# Patient Record
Sex: FEMALE | Race: WHITE | NOT HISPANIC OR LATINO | URBAN - METROPOLITAN AREA
[De-identification: names, ages, dates, MRNs, and addresses within clinical notes are randomized per-mention and may not be internally consistent; named-entity substitution may affect disease eponyms.]

---

## 2017-01-11 ENCOUNTER — OUTPATIENT (OUTPATIENT)
Dept: OUTPATIENT SERVICES | Facility: HOSPITAL | Age: 82
LOS: 1 days | End: 2017-01-11
Payer: MEDICARE

## 2017-01-11 LAB
BASOPHILS NFR BLD AUTO: 0.4 % — SIGNIFICANT CHANGE UP (ref 0–2)
EOSINOPHIL NFR BLD AUTO: 1.2 % — SIGNIFICANT CHANGE UP (ref 0–6)
HCT VFR BLD CALC: 39 % — SIGNIFICANT CHANGE UP (ref 34.5–45)
HGB BLD-MCNC: 11.8 G/DL — SIGNIFICANT CHANGE UP (ref 11.5–15.5)
LYMPHOCYTES # BLD AUTO: 18.6 % — SIGNIFICANT CHANGE UP (ref 13–44)
MCHC RBC-ENTMCNC: 23.7 PG — LOW (ref 27–34)
MCHC RBC-ENTMCNC: 30.3 G/DL — LOW (ref 32–36)
MCV RBC AUTO: 78.3 FL — LOW (ref 80–100)
MONOCYTES NFR BLD AUTO: 6.8 % — SIGNIFICANT CHANGE UP (ref 2–14)
NEUTROPHILS NFR BLD AUTO: 73 % — SIGNIFICANT CHANGE UP (ref 43–77)
PLATELET # BLD AUTO: 420 K/UL — HIGH (ref 150–400)
RBC # BLD: 4.98 M/UL — SIGNIFICANT CHANGE UP (ref 3.8–5.2)
RBC # BLD: 4.98 M/UL — SIGNIFICANT CHANGE UP (ref 3.8–5.2)
RBC # FLD: 18 % — HIGH (ref 10.3–16.9)
RETICS/RBC NFR: 1.3 % — SIGNIFICANT CHANGE UP (ref 0.5–2.5)
WBC # BLD: 7.3 K/UL — SIGNIFICANT CHANGE UP (ref 3.8–10.5)
WBC # FLD AUTO: 7.3 K/UL — SIGNIFICANT CHANGE UP (ref 3.8–10.5)

## 2017-01-11 PROCEDURE — 36415 COLL VENOUS BLD VENIPUNCTURE: CPT

## 2017-01-11 PROCEDURE — 85045 AUTOMATED RETICULOCYTE COUNT: CPT

## 2017-01-11 PROCEDURE — 85025 COMPLETE CBC W/AUTO DIFF WBC: CPT

## 2017-01-12 DIAGNOSIS — R53.1 WEAKNESS: ICD-10-CM

## 2017-01-12 DIAGNOSIS — D62 ACUTE POSTHEMORRHAGIC ANEMIA: ICD-10-CM

## 2017-01-12 DIAGNOSIS — D50.9 IRON DEFICIENCY ANEMIA, UNSPECIFIED: ICD-10-CM

## 2017-01-12 LAB
ANISOCYTOSIS BLD QL: SLIGHT — SIGNIFICANT CHANGE UP
EOSINOPHIL NFR BLD AUTO: 1 % — SIGNIFICANT CHANGE UP (ref 0–6)
HYPOCHROMIA BLD QL: SLIGHT — SIGNIFICANT CHANGE UP
LYMPHOCYTES # BLD AUTO: 20 % — SIGNIFICANT CHANGE UP (ref 13–44)
MANUAL DIF COMMENT BLD-IMP: SIGNIFICANT CHANGE UP
MANUAL SMEAR VERIFICATION: YES — SIGNIFICANT CHANGE UP
MICROCYTES BLD QL: SLIGHT — SIGNIFICANT CHANGE UP
MONOCYTES NFR BLD AUTO: 6 % — SIGNIFICANT CHANGE UP (ref 2–14)
NEUTROPHILS NFR BLD AUTO: 71 % — SIGNIFICANT CHANGE UP (ref 43–77)
NEUTS BAND # BLD: 2 % — SIGNIFICANT CHANGE UP
OVALOCYTES BLD QL SMEAR: SLIGHT — SIGNIFICANT CHANGE UP
PLAT MORPH BLD: (no result)
RBC BLD AUTO: (no result)
SCHISTOCYTES BLD QL AUTO: SIGNIFICANT CHANGE UP
SPHEROCYTES BLD QL SMEAR: SLIGHT — SIGNIFICANT CHANGE UP
TARGETS BLD QL SMEAR: SLIGHT — SIGNIFICANT CHANGE UP

## 2017-01-24 ENCOUNTER — OUTPATIENT (OUTPATIENT)
Dept: OUTPATIENT SERVICES | Facility: HOSPITAL | Age: 82
LOS: 1 days | End: 2017-01-24
Payer: MEDICARE

## 2017-01-24 LAB
BASOPHILS NFR BLD AUTO: 0.4 % — SIGNIFICANT CHANGE UP (ref 0–2)
EOSINOPHIL NFR BLD AUTO: 1.7 % — SIGNIFICANT CHANGE UP (ref 0–6)
HCT VFR BLD CALC: 31.2 % — LOW (ref 34.5–45)
HGB BLD-MCNC: 9.4 G/DL — LOW (ref 11.5–15.5)
LYMPHOCYTES # BLD AUTO: 18.6 % — SIGNIFICANT CHANGE UP (ref 13–44)
MCHC RBC-ENTMCNC: 23.6 PG — LOW (ref 27–34)
MCHC RBC-ENTMCNC: 30.1 G/DL — LOW (ref 32–36)
MCV RBC AUTO: 78.4 FL — LOW (ref 80–100)
MONOCYTES NFR BLD AUTO: 8.3 % — SIGNIFICANT CHANGE UP (ref 2–14)
NEUTROPHILS NFR BLD AUTO: 71 % — SIGNIFICANT CHANGE UP (ref 43–77)
PLATELET # BLD AUTO: 199 K/UL — SIGNIFICANT CHANGE UP (ref 150–400)
RBC # BLD: 3.98 M/UL — SIGNIFICANT CHANGE UP (ref 3.8–5.2)
RBC # BLD: 3.98 M/UL — SIGNIFICANT CHANGE UP (ref 3.8–5.2)
RBC # FLD: 18.7 % — HIGH (ref 10.3–16.9)
RETICS/RBC NFR: 1.6 % — SIGNIFICANT CHANGE UP (ref 0.5–2.5)
WBC # BLD: 7.1 K/UL — SIGNIFICANT CHANGE UP (ref 3.8–10.5)
WBC # FLD AUTO: 7.1 K/UL — SIGNIFICANT CHANGE UP (ref 3.8–10.5)

## 2017-01-24 PROCEDURE — 85025 COMPLETE CBC W/AUTO DIFF WBC: CPT

## 2017-01-24 PROCEDURE — 36415 COLL VENOUS BLD VENIPUNCTURE: CPT

## 2017-01-24 PROCEDURE — 85045 AUTOMATED RETICULOCYTE COUNT: CPT

## 2017-01-25 DIAGNOSIS — D62 ACUTE POSTHEMORRHAGIC ANEMIA: ICD-10-CM

## 2017-01-25 DIAGNOSIS — D50.9 IRON DEFICIENCY ANEMIA, UNSPECIFIED: ICD-10-CM

## 2017-01-25 DIAGNOSIS — R53.1 WEAKNESS: ICD-10-CM

## 2017-01-25 LAB
ANISOCYTOSIS BLD QL: SLIGHT — SIGNIFICANT CHANGE UP
BASOPHILS NFR BLD AUTO: 1 % — SIGNIFICANT CHANGE UP (ref 0–2)
DACRYOCYTES BLD QL SMEAR: SLIGHT — SIGNIFICANT CHANGE UP
EOSINOPHIL NFR BLD AUTO: 1 % — SIGNIFICANT CHANGE UP (ref 0–6)
HYPOCHROMIA BLD QL: SLIGHT — SIGNIFICANT CHANGE UP
LYMPHOCYTES # BLD AUTO: 14 % — SIGNIFICANT CHANGE UP (ref 13–44)
MANUAL DIF COMMENT BLD-IMP: SIGNIFICANT CHANGE UP
MANUAL SMEAR VERIFICATION: YES — SIGNIFICANT CHANGE UP
MICROCYTES BLD QL: SLIGHT — SIGNIFICANT CHANGE UP
MONOCYTES NFR BLD AUTO: 4 % — SIGNIFICANT CHANGE UP (ref 2–14)
NEUTROPHILS NFR BLD AUTO: 77 % — SIGNIFICANT CHANGE UP (ref 43–77)
NEUTS BAND # BLD: 3 % — SIGNIFICANT CHANGE UP
OVALOCYTES BLD QL SMEAR: SLIGHT — SIGNIFICANT CHANGE UP
PLAT MORPH BLD: (no result)
RBC BLD AUTO: (no result)
SCHISTOCYTES BLD QL AUTO: SIGNIFICANT CHANGE UP
SPHEROCYTES BLD QL SMEAR: SLIGHT — SIGNIFICANT CHANGE UP

## 2017-01-26 ENCOUNTER — OUTPATIENT (OUTPATIENT)
Dept: OUTPATIENT SERVICES | Facility: HOSPITAL | Age: 82
LOS: 1 days | End: 2017-01-26
Payer: MEDICARE

## 2017-01-26 DIAGNOSIS — D50.9 IRON DEFICIENCY ANEMIA, UNSPECIFIED: ICD-10-CM

## 2017-01-26 DIAGNOSIS — K90.9 INTESTINAL MALABSORPTION, UNSPECIFIED: ICD-10-CM

## 2017-01-26 PROCEDURE — 96365 THER/PROPH/DIAG IV INF INIT: CPT

## 2017-01-26 RX ORDER — SODIUM FERRIC GLUCONAT/SUCROSE 62.5MG/5ML
125 AMPUL (ML) INTRAVENOUS ONCE
Qty: 0 | Refills: 0 | Status: DISCONTINUED | OUTPATIENT
Start: 2017-01-26 | End: 2017-02-10

## 2017-02-02 ENCOUNTER — OUTPATIENT (OUTPATIENT)
Dept: OUTPATIENT SERVICES | Facility: HOSPITAL | Age: 82
LOS: 1 days | End: 2017-02-02
Payer: MEDICARE

## 2017-02-02 DIAGNOSIS — D50.9 IRON DEFICIENCY ANEMIA, UNSPECIFIED: ICD-10-CM

## 2017-02-02 DIAGNOSIS — K90.9 INTESTINAL MALABSORPTION, UNSPECIFIED: ICD-10-CM

## 2017-02-02 PROCEDURE — 96365 THER/PROPH/DIAG IV INF INIT: CPT

## 2017-02-02 RX ORDER — SODIUM FERRIC GLUCONAT/SUCROSE 62.5MG/5ML
125 AMPUL (ML) INTRAVENOUS ONCE
Qty: 0 | Refills: 0 | Status: DISCONTINUED | OUTPATIENT
Start: 2017-02-02 | End: 2017-02-17

## 2017-02-09 ENCOUNTER — OUTPATIENT (OUTPATIENT)
Dept: OUTPATIENT SERVICES | Facility: HOSPITAL | Age: 82
LOS: 1 days | End: 2017-02-09
Payer: MEDICARE

## 2017-02-09 DIAGNOSIS — D50.9 IRON DEFICIENCY ANEMIA, UNSPECIFIED: ICD-10-CM

## 2017-02-09 DIAGNOSIS — K90.9 INTESTINAL MALABSORPTION, UNSPECIFIED: ICD-10-CM

## 2017-02-09 PROCEDURE — 96365 THER/PROPH/DIAG IV INF INIT: CPT

## 2017-02-09 RX ORDER — SODIUM FERRIC GLUCONAT/SUCROSE 62.5MG/5ML
125 AMPUL (ML) INTRAVENOUS ONCE
Qty: 0 | Refills: 0 | Status: DISCONTINUED | OUTPATIENT
Start: 2017-02-09 | End: 2017-02-24

## 2017-02-16 ENCOUNTER — OUTPATIENT (OUTPATIENT)
Dept: OUTPATIENT SERVICES | Facility: HOSPITAL | Age: 82
LOS: 1 days | End: 2017-02-16
Payer: MEDICARE

## 2017-02-16 DIAGNOSIS — R53.1 WEAKNESS: ICD-10-CM

## 2017-02-16 DIAGNOSIS — D50.9 IRON DEFICIENCY ANEMIA, UNSPECIFIED: ICD-10-CM

## 2017-02-16 DIAGNOSIS — D62 ACUTE POSTHEMORRHAGIC ANEMIA: ICD-10-CM

## 2017-02-16 LAB
BASOPHILS NFR BLD AUTO: 0.5 % — SIGNIFICANT CHANGE UP (ref 0–2)
EOSINOPHIL NFR BLD AUTO: 0.8 % — SIGNIFICANT CHANGE UP (ref 0–6)
HCT VFR BLD CALC: 36.6 % — SIGNIFICANT CHANGE UP (ref 34.5–45)
HGB BLD-MCNC: 11.2 G/DL — LOW (ref 11.5–15.5)
LYMPHOCYTES # BLD AUTO: 16.6 % — SIGNIFICANT CHANGE UP (ref 13–44)
MCHC RBC-ENTMCNC: 24.7 PG — LOW (ref 27–34)
MCHC RBC-ENTMCNC: 30.6 G/DL — LOW (ref 32–36)
MCV RBC AUTO: 80.6 FL — SIGNIFICANT CHANGE UP (ref 80–100)
MONOCYTES NFR BLD AUTO: 7.2 % — SIGNIFICANT CHANGE UP (ref 2–14)
NEUTROPHILS NFR BLD AUTO: 74.9 % — SIGNIFICANT CHANGE UP (ref 43–77)
PLATELET # BLD AUTO: 242 K/UL — SIGNIFICANT CHANGE UP (ref 150–400)
RBC # BLD: 4.54 M/UL — SIGNIFICANT CHANGE UP (ref 3.8–5.2)
RBC # BLD: 4.54 M/UL — SIGNIFICANT CHANGE UP (ref 3.8–5.2)
RBC # FLD: 22.4 % — HIGH (ref 10.3–16.9)
RETICS/RBC NFR: 1.8 % — SIGNIFICANT CHANGE UP (ref 0.5–2.5)
WBC # BLD: 6.1 K/UL — SIGNIFICANT CHANGE UP (ref 3.8–10.5)
WBC # FLD AUTO: 6.1 K/UL — SIGNIFICANT CHANGE UP (ref 3.8–10.5)

## 2017-02-16 PROCEDURE — 36415 COLL VENOUS BLD VENIPUNCTURE: CPT

## 2017-02-16 PROCEDURE — 85045 AUTOMATED RETICULOCYTE COUNT: CPT

## 2017-02-16 PROCEDURE — 85025 COMPLETE CBC W/AUTO DIFF WBC: CPT

## 2017-02-17 LAB
ANISOCYTOSIS BLD QL: SIGNIFICANT CHANGE UP
BASOPHILS NFR BLD AUTO: 1 % — SIGNIFICANT CHANGE UP (ref 0–2)
DACRYOCYTES BLD QL SMEAR: SLIGHT — SIGNIFICANT CHANGE UP
EOSINOPHIL NFR BLD AUTO: 1 % — SIGNIFICANT CHANGE UP (ref 0–6)
HYPOCHROMIA BLD QL: SLIGHT — SIGNIFICANT CHANGE UP
LYMPHOCYTES # BLD AUTO: 17 % — SIGNIFICANT CHANGE UP (ref 13–44)
MANUAL DIF COMMENT BLD-IMP: SIGNIFICANT CHANGE UP
MANUAL SMEAR VERIFICATION: YES — SIGNIFICANT CHANGE UP
MICROCYTES BLD QL: SLIGHT — SIGNIFICANT CHANGE UP
MONOCYTES NFR BLD AUTO: 7 % — SIGNIFICANT CHANGE UP (ref 2–14)
NEUTROPHILS NFR BLD AUTO: 73 % — SIGNIFICANT CHANGE UP (ref 43–77)
NEUTS BAND # BLD: 1 % — SIGNIFICANT CHANGE UP
OVALOCYTES BLD QL SMEAR: SLIGHT — SIGNIFICANT CHANGE UP
PLAT MORPH BLD: (no result)
RBC BLD AUTO: (no result)
SCHISTOCYTES BLD QL AUTO: SIGNIFICANT CHANGE UP
SPHEROCYTES BLD QL SMEAR: SLIGHT — SIGNIFICANT CHANGE UP

## 2017-03-02 ENCOUNTER — OUTPATIENT (OUTPATIENT)
Dept: OUTPATIENT SERVICES | Facility: HOSPITAL | Age: 82
LOS: 1 days | End: 2017-03-02
Payer: MEDICARE

## 2017-03-02 DIAGNOSIS — R53.1 WEAKNESS: ICD-10-CM

## 2017-03-02 DIAGNOSIS — D62 ACUTE POSTHEMORRHAGIC ANEMIA: ICD-10-CM

## 2017-03-02 DIAGNOSIS — D50.9 IRON DEFICIENCY ANEMIA, UNSPECIFIED: ICD-10-CM

## 2017-03-02 DIAGNOSIS — E55.9 VITAMIN D DEFICIENCY, UNSPECIFIED: ICD-10-CM

## 2017-03-02 LAB
BASOPHILS NFR BLD AUTO: 0.5 % — SIGNIFICANT CHANGE UP (ref 0–2)
EOSINOPHIL NFR BLD AUTO: 1.2 % — SIGNIFICANT CHANGE UP (ref 0–6)
HCT VFR BLD CALC: 34.7 % — SIGNIFICANT CHANGE UP (ref 34.5–45)
HGB BLD-MCNC: 11 G/DL — LOW (ref 11.5–15.5)
LYMPHOCYTES # BLD AUTO: 25.7 % — SIGNIFICANT CHANGE UP (ref 13–44)
MCHC RBC-ENTMCNC: 24.4 PG — LOW (ref 27–34)
MCHC RBC-ENTMCNC: 31.7 G/DL — LOW (ref 32–36)
MCV RBC AUTO: 77.1 FL — LOW (ref 80–100)
MONOCYTES NFR BLD AUTO: 7.8 % — SIGNIFICANT CHANGE UP (ref 2–14)
NEUTROPHILS NFR BLD AUTO: 64.8 % — SIGNIFICANT CHANGE UP (ref 43–77)
PLATELET # BLD AUTO: 296 K/UL — SIGNIFICANT CHANGE UP (ref 150–400)
RBC # BLD: 4.5 M/UL — SIGNIFICANT CHANGE UP (ref 3.8–5.2)
RBC # BLD: 4.5 M/UL — SIGNIFICANT CHANGE UP (ref 3.8–5.2)
RBC # FLD: 21 % — HIGH (ref 10.3–16.9)
RETICS/RBC NFR: 1.2 % — SIGNIFICANT CHANGE UP (ref 0.5–2.5)
WBC # BLD: 6.4 K/UL — SIGNIFICANT CHANGE UP (ref 3.8–10.5)
WBC # FLD AUTO: 6.4 K/UL — SIGNIFICANT CHANGE UP (ref 3.8–10.5)

## 2017-03-02 PROCEDURE — 85045 AUTOMATED RETICULOCYTE COUNT: CPT

## 2017-03-02 PROCEDURE — 85025 COMPLETE CBC W/AUTO DIFF WBC: CPT

## 2017-03-02 PROCEDURE — 36415 COLL VENOUS BLD VENIPUNCTURE: CPT

## 2017-03-03 LAB
ANISOCYTOSIS BLD QL: SIGNIFICANT CHANGE UP
DACRYOCYTES BLD QL SMEAR: SLIGHT — SIGNIFICANT CHANGE UP
HYPOCHROMIA BLD QL: SLIGHT — SIGNIFICANT CHANGE UP
LYMPHOCYTES # BLD AUTO: 25 % — SIGNIFICANT CHANGE UP (ref 13–44)
MANUAL DIF COMMENT BLD-IMP: SIGNIFICANT CHANGE UP
MANUAL SMEAR VERIFICATION: YES — SIGNIFICANT CHANGE UP
MICROCYTES BLD QL: SLIGHT — SIGNIFICANT CHANGE UP
MONOCYTES NFR BLD AUTO: 8 % — SIGNIFICANT CHANGE UP (ref 2–14)
NEUTROPHILS NFR BLD AUTO: 66 % — SIGNIFICANT CHANGE UP (ref 43–77)
NEUTS BAND # BLD: 1 % — SIGNIFICANT CHANGE UP
OVALOCYTES BLD QL SMEAR: SLIGHT — SIGNIFICANT CHANGE UP
PLAT MORPH BLD: (no result)
RBC BLD AUTO: (no result)
SCHISTOCYTES BLD QL AUTO: SIGNIFICANT CHANGE UP
SPHEROCYTES BLD QL SMEAR: SLIGHT — SIGNIFICANT CHANGE UP

## 2017-03-15 ENCOUNTER — OUTPATIENT (OUTPATIENT)
Dept: OUTPATIENT SERVICES | Facility: HOSPITAL | Age: 82
LOS: 1 days | End: 2017-03-15
Payer: MEDICARE

## 2017-03-15 DIAGNOSIS — D50.9 IRON DEFICIENCY ANEMIA, UNSPECIFIED: ICD-10-CM

## 2017-03-15 DIAGNOSIS — K90.9 INTESTINAL MALABSORPTION, UNSPECIFIED: ICD-10-CM

## 2017-03-15 PROCEDURE — 96365 THER/PROPH/DIAG IV INF INIT: CPT

## 2017-03-15 RX ORDER — FERUMOXYTOL 510 MG/17ML
510 INJECTION INTRAVENOUS ONCE
Qty: 0 | Refills: 0 | Status: DISCONTINUED | OUTPATIENT
Start: 2017-03-15 | End: 2017-03-30

## 2017-03-21 RX ORDER — FERUMOXYTOL 510 MG/17ML
510 INJECTION INTRAVENOUS ONCE
Qty: 0 | Refills: 0 | Status: DISCONTINUED | OUTPATIENT
Start: 2017-03-22 | End: 2017-04-06

## 2017-03-22 ENCOUNTER — OUTPATIENT (OUTPATIENT)
Dept: OUTPATIENT SERVICES | Facility: HOSPITAL | Age: 82
LOS: 1 days | End: 2017-03-22
Payer: MEDICARE

## 2017-03-22 DIAGNOSIS — D50.9 IRON DEFICIENCY ANEMIA, UNSPECIFIED: ICD-10-CM

## 2017-03-22 DIAGNOSIS — K90.9 INTESTINAL MALABSORPTION, UNSPECIFIED: ICD-10-CM

## 2017-03-22 PROCEDURE — 96365 THER/PROPH/DIAG IV INF INIT: CPT

## 2017-04-13 ENCOUNTER — OUTPATIENT (OUTPATIENT)
Dept: OUTPATIENT SERVICES | Facility: HOSPITAL | Age: 82
LOS: 1 days | End: 2017-04-13
Payer: MEDICARE

## 2017-04-13 DIAGNOSIS — D62 ACUTE POSTHEMORRHAGIC ANEMIA: ICD-10-CM

## 2017-04-13 DIAGNOSIS — D50.9 IRON DEFICIENCY ANEMIA, UNSPECIFIED: ICD-10-CM

## 2017-04-13 DIAGNOSIS — R53.1 WEAKNESS: ICD-10-CM

## 2017-04-13 LAB
BASOPHILS NFR BLD AUTO: 0.4 % — SIGNIFICANT CHANGE UP (ref 0–2)
EOSINOPHIL NFR BLD AUTO: 1.3 % — SIGNIFICANT CHANGE UP (ref 0–6)
HCT VFR BLD CALC: 41.3 % — SIGNIFICANT CHANGE UP (ref 34.5–45)
HGB BLD-MCNC: 13.8 G/DL — SIGNIFICANT CHANGE UP (ref 11.5–15.5)
LYMPHOCYTES # BLD AUTO: 14.8 % — SIGNIFICANT CHANGE UP (ref 13–44)
MCHC RBC-ENTMCNC: 27.4 PG — SIGNIFICANT CHANGE UP (ref 27–34)
MCHC RBC-ENTMCNC: 33.4 G/DL — SIGNIFICANT CHANGE UP (ref 32–36)
MCV RBC AUTO: 81.9 FL — SIGNIFICANT CHANGE UP (ref 80–100)
MONOCYTES NFR BLD AUTO: 7.6 % — SIGNIFICANT CHANGE UP (ref 2–14)
NEUTROPHILS NFR BLD AUTO: 75.9 % — SIGNIFICANT CHANGE UP (ref 43–77)
PLATELET # BLD AUTO: 277 K/UL — SIGNIFICANT CHANGE UP (ref 150–400)
RBC # BLD: 5.04 M/UL — SIGNIFICANT CHANGE UP (ref 3.8–5.2)
RBC # BLD: 5.04 M/UL — SIGNIFICANT CHANGE UP (ref 3.8–5.2)
RBC # FLD: 23.5 % — HIGH (ref 10.3–16.9)
RETICS/RBC NFR: 1 % — SIGNIFICANT CHANGE UP (ref 0.5–2.5)
WBC # BLD: 7.7 K/UL — SIGNIFICANT CHANGE UP (ref 3.8–10.5)
WBC # FLD AUTO: 7.7 K/UL — SIGNIFICANT CHANGE UP (ref 3.8–10.5)

## 2017-04-13 PROCEDURE — 36415 COLL VENOUS BLD VENIPUNCTURE: CPT

## 2017-04-13 PROCEDURE — 85045 AUTOMATED RETICULOCYTE COUNT: CPT

## 2017-04-13 PROCEDURE — 85025 COMPLETE CBC W/AUTO DIFF WBC: CPT

## 2017-04-14 LAB
ANISOCYTOSIS BLD QL: SIGNIFICANT CHANGE UP
DACRYOCYTES BLD QL SMEAR: SLIGHT — SIGNIFICANT CHANGE UP
HYPOCHROMIA BLD QL: SLIGHT — SIGNIFICANT CHANGE UP
LYMPHOCYTES # BLD AUTO: 13 % — SIGNIFICANT CHANGE UP (ref 13–44)
MANUAL DIF COMMENT BLD-IMP: SIGNIFICANT CHANGE UP
MANUAL SMEAR VERIFICATION: YES — SIGNIFICANT CHANGE UP
MICROCYTES BLD QL: SLIGHT — SIGNIFICANT CHANGE UP
MONOCYTES NFR BLD AUTO: 8 % — SIGNIFICANT CHANGE UP (ref 2–14)
NEUTROPHILS NFR BLD AUTO: 79 % — HIGH (ref 43–77)
OVALOCYTES BLD QL SMEAR: SLIGHT — SIGNIFICANT CHANGE UP
PLAT MORPH BLD: (no result)
RBC BLD AUTO: (no result)
SCHISTOCYTES BLD QL AUTO: SIGNIFICANT CHANGE UP
SPHEROCYTES BLD QL SMEAR: SLIGHT — SIGNIFICANT CHANGE UP

## 2017-05-18 ENCOUNTER — OUTPATIENT (OUTPATIENT)
Dept: OUTPATIENT SERVICES | Facility: HOSPITAL | Age: 82
LOS: 1 days | End: 2017-05-18
Payer: MEDICARE

## 2017-05-18 LAB
BASOPHILS NFR BLD AUTO: 0.3 % — SIGNIFICANT CHANGE UP (ref 0–2)
EOSINOPHIL NFR BLD AUTO: 1.6 % — SIGNIFICANT CHANGE UP (ref 0–6)
HCT VFR BLD CALC: 39.3 % — SIGNIFICANT CHANGE UP (ref 34.5–45)
HGB BLD-MCNC: 13.1 G/DL — SIGNIFICANT CHANGE UP (ref 11.5–15.5)
LYMPHOCYTES # BLD AUTO: 20.2 % — SIGNIFICANT CHANGE UP (ref 13–44)
MCHC RBC-ENTMCNC: 28.3 PG — SIGNIFICANT CHANGE UP (ref 27–34)
MCHC RBC-ENTMCNC: 33.3 G/DL — SIGNIFICANT CHANGE UP (ref 32–36)
MCV RBC AUTO: 84.9 FL — SIGNIFICANT CHANGE UP (ref 80–100)
MONOCYTES NFR BLD AUTO: 7.6 % — SIGNIFICANT CHANGE UP (ref 2–14)
NEUTROPHILS NFR BLD AUTO: 70.3 % — SIGNIFICANT CHANGE UP (ref 43–77)
PLATELET # BLD AUTO: 221 K/UL — SIGNIFICANT CHANGE UP (ref 150–400)
RBC # BLD: 4.63 M/UL — SIGNIFICANT CHANGE UP (ref 3.8–5.2)
RBC # BLD: 4.63 M/UL — SIGNIFICANT CHANGE UP (ref 3.8–5.2)
RBC # FLD: 20.1 % — HIGH (ref 10.3–16.9)
RETICS/RBC NFR: 1.2 % — SIGNIFICANT CHANGE UP (ref 0.5–2.5)
WBC # BLD: 6.7 K/UL — SIGNIFICANT CHANGE UP (ref 3.8–10.5)
WBC # FLD AUTO: 6.7 K/UL — SIGNIFICANT CHANGE UP (ref 3.8–10.5)

## 2017-05-18 PROCEDURE — 85025 COMPLETE CBC W/AUTO DIFF WBC: CPT

## 2017-05-18 PROCEDURE — 85045 AUTOMATED RETICULOCYTE COUNT: CPT

## 2017-05-18 PROCEDURE — 36415 COLL VENOUS BLD VENIPUNCTURE: CPT

## 2017-05-19 LAB
ANISOCYTOSIS BLD QL: SIGNIFICANT CHANGE UP
HYPOCHROMIA BLD QL: SLIGHT — SIGNIFICANT CHANGE UP
LYMPHOCYTES # BLD AUTO: 22 % — SIGNIFICANT CHANGE UP (ref 13–44)
MANUAL DIF COMMENT BLD-IMP: SIGNIFICANT CHANGE UP
MANUAL SMEAR VERIFICATION: YES — SIGNIFICANT CHANGE UP
MONOCYTES NFR BLD AUTO: 4 % — SIGNIFICANT CHANGE UP (ref 2–14)
NEUTROPHILS NFR BLD AUTO: 74 % — SIGNIFICANT CHANGE UP (ref 43–77)
OVALOCYTES BLD QL SMEAR: SLIGHT — SIGNIFICANT CHANGE UP
PLAT MORPH BLD: (no result)
RBC BLD AUTO: (no result)

## 2017-05-22 DIAGNOSIS — E55.9 VITAMIN D DEFICIENCY, UNSPECIFIED: ICD-10-CM

## 2017-05-22 DIAGNOSIS — D50.9 IRON DEFICIENCY ANEMIA, UNSPECIFIED: ICD-10-CM

## 2017-05-22 DIAGNOSIS — R53.1 WEAKNESS: ICD-10-CM

## 2017-05-22 DIAGNOSIS — D62 ACUTE POSTHEMORRHAGIC ANEMIA: ICD-10-CM

## 2017-06-29 ENCOUNTER — OUTPATIENT (OUTPATIENT)
Dept: OUTPATIENT SERVICES | Facility: HOSPITAL | Age: 82
LOS: 1 days | End: 2017-06-29
Payer: MEDICARE

## 2017-06-29 LAB
BASOPHILS NFR BLD AUTO: 0.2 % — SIGNIFICANT CHANGE UP (ref 0–2)
EOSINOPHIL NFR BLD AUTO: 1.1 % — SIGNIFICANT CHANGE UP (ref 0–6)
HCT VFR BLD CALC: 40.2 % — SIGNIFICANT CHANGE UP (ref 34.5–45)
HGB BLD-MCNC: 13.7 G/DL — SIGNIFICANT CHANGE UP (ref 11.5–15.5)
LYMPHOCYTES # BLD AUTO: 18 % — SIGNIFICANT CHANGE UP (ref 13–44)
MCHC RBC-ENTMCNC: 29.8 PG — SIGNIFICANT CHANGE UP (ref 27–34)
MCHC RBC-ENTMCNC: 34.1 G/DL — SIGNIFICANT CHANGE UP (ref 32–36)
MCV RBC AUTO: 87.6 FL — SIGNIFICANT CHANGE UP (ref 80–100)
MONOCYTES NFR BLD AUTO: 6.8 % — SIGNIFICANT CHANGE UP (ref 2–14)
NEUTROPHILS NFR BLD AUTO: 73.9 % — SIGNIFICANT CHANGE UP (ref 43–77)
PLATELET # BLD AUTO: 295 K/UL — SIGNIFICANT CHANGE UP (ref 150–400)
RBC # BLD: 4.59 M/UL — SIGNIFICANT CHANGE UP (ref 3.8–5.2)
RBC # BLD: 4.59 M/UL — SIGNIFICANT CHANGE UP (ref 3.8–5.2)
RBC # FLD: 14.4 % — SIGNIFICANT CHANGE UP (ref 10.3–16.9)
RETICS/RBC NFR: 1.2 % — SIGNIFICANT CHANGE UP (ref 0.5–2.5)
WBC # BLD: 8.4 K/UL — SIGNIFICANT CHANGE UP (ref 3.8–10.5)
WBC # FLD AUTO: 8.4 K/UL — SIGNIFICANT CHANGE UP (ref 3.8–10.5)

## 2017-06-29 PROCEDURE — 85025 COMPLETE CBC W/AUTO DIFF WBC: CPT

## 2017-06-29 PROCEDURE — 36415 COLL VENOUS BLD VENIPUNCTURE: CPT

## 2017-06-29 PROCEDURE — 85045 AUTOMATED RETICULOCYTE COUNT: CPT

## 2017-06-30 DIAGNOSIS — D50.9 IRON DEFICIENCY ANEMIA, UNSPECIFIED: ICD-10-CM

## 2017-06-30 DIAGNOSIS — R53.1 WEAKNESS: ICD-10-CM

## 2017-06-30 DIAGNOSIS — D62 ACUTE POSTHEMORRHAGIC ANEMIA: ICD-10-CM

## 2017-06-30 LAB
EOSINOPHIL NFR BLD AUTO: 1 % — SIGNIFICANT CHANGE UP (ref 0–6)
HYPOCHROMIA BLD QL: SLIGHT — SIGNIFICANT CHANGE UP
LYMPHOCYTES # BLD AUTO: 17 % — SIGNIFICANT CHANGE UP (ref 13–44)
MANUAL DIF COMMENT BLD-IMP: SIGNIFICANT CHANGE UP
MANUAL SMEAR VERIFICATION: YES — SIGNIFICANT CHANGE UP
MONOCYTES NFR BLD AUTO: 7 % — SIGNIFICANT CHANGE UP (ref 2–14)
NEUTROPHILS NFR BLD AUTO: 74 % — SIGNIFICANT CHANGE UP (ref 43–77)
NEUTS BAND # BLD: 1 % — SIGNIFICANT CHANGE UP
OVALOCYTES BLD QL SMEAR: SLIGHT — SIGNIFICANT CHANGE UP
PLAT MORPH BLD: (no result)
RBC BLD AUTO: (no result)

## 2017-08-14 ENCOUNTER — OUTPATIENT (OUTPATIENT)
Dept: OUTPATIENT SERVICES | Facility: HOSPITAL | Age: 82
LOS: 1 days | End: 2017-08-14
Payer: COMMERCIAL

## 2017-08-14 LAB
BASOPHILS NFR BLD AUTO: 0.3 % — SIGNIFICANT CHANGE UP (ref 0–2)
EOSINOPHIL NFR BLD AUTO: 1.7 % — SIGNIFICANT CHANGE UP (ref 0–6)
HCT VFR BLD CALC: 40.5 % — SIGNIFICANT CHANGE UP (ref 34.5–45)
HGB BLD-MCNC: 13 G/DL — SIGNIFICANT CHANGE UP (ref 11.5–15.5)
LYMPHOCYTES # BLD AUTO: 22.5 % — SIGNIFICANT CHANGE UP (ref 13–44)
MCHC RBC-ENTMCNC: 27.7 PG — SIGNIFICANT CHANGE UP (ref 27–34)
MCHC RBC-ENTMCNC: 32.1 G/DL — SIGNIFICANT CHANGE UP (ref 32–36)
MCV RBC AUTO: 86.2 FL — SIGNIFICANT CHANGE UP (ref 80–100)
MONOCYTES NFR BLD AUTO: 5.9 % — SIGNIFICANT CHANGE UP (ref 2–14)
NEUTROPHILS NFR BLD AUTO: 69.6 % — SIGNIFICANT CHANGE UP (ref 43–77)
PLATELET # BLD AUTO: 248 K/UL — SIGNIFICANT CHANGE UP (ref 150–400)
RBC # BLD: 4.7 M/UL — SIGNIFICANT CHANGE UP (ref 3.8–5.2)
RBC # BLD: 4.7 M/UL — SIGNIFICANT CHANGE UP (ref 3.8–5.2)
RBC # FLD: 14.5 % — SIGNIFICANT CHANGE UP (ref 10.3–16.9)
RETICS/RBC NFR: 1.1 % — SIGNIFICANT CHANGE UP (ref 0.5–2.5)
WBC # BLD: 6.9 K/UL — SIGNIFICANT CHANGE UP (ref 3.8–10.5)
WBC # FLD AUTO: 6.9 K/UL — SIGNIFICANT CHANGE UP (ref 3.8–10.5)

## 2017-08-14 PROCEDURE — 36415 COLL VENOUS BLD VENIPUNCTURE: CPT

## 2017-08-14 PROCEDURE — 85045 AUTOMATED RETICULOCYTE COUNT: CPT

## 2017-08-14 PROCEDURE — 85025 COMPLETE CBC W/AUTO DIFF WBC: CPT

## 2017-08-15 DIAGNOSIS — R53.1 WEAKNESS: ICD-10-CM

## 2017-08-15 DIAGNOSIS — D62 ACUTE POSTHEMORRHAGIC ANEMIA: ICD-10-CM

## 2017-08-15 DIAGNOSIS — D50.9 IRON DEFICIENCY ANEMIA, UNSPECIFIED: ICD-10-CM

## 2017-08-15 LAB
BASOPHILS NFR BLD AUTO: 1 % — SIGNIFICANT CHANGE UP (ref 0–2)
DOHLE BOD BLD QL SMEAR: SIGNIFICANT CHANGE UP
EOSINOPHIL NFR BLD AUTO: 1 % — SIGNIFICANT CHANGE UP (ref 0–6)
LYMPHOCYTES # BLD AUTO: 21 % — SIGNIFICANT CHANGE UP (ref 13–44)
MANUAL DIF COMMENT BLD-IMP: SIGNIFICANT CHANGE UP
MANUAL DIF COMMENT BLD-IMP: SIGNIFICANT CHANGE UP
MANUAL SMEAR VERIFICATION: YES — SIGNIFICANT CHANGE UP
MONOCYTES NFR BLD AUTO: 6 % — SIGNIFICANT CHANGE UP (ref 2–14)
NEUTROPHILS NFR BLD AUTO: 70 % — SIGNIFICANT CHANGE UP (ref 43–77)
NEUTS BAND # BLD: 1 % — SIGNIFICANT CHANGE UP
PLAT MORPH BLD: (no result)
RBC BLD AUTO: NORMAL — SIGNIFICANT CHANGE UP
TOXIC GRANULES BLD QL SMEAR: SLIGHT — SIGNIFICANT CHANGE UP

## 2017-09-28 ENCOUNTER — OUTPATIENT (OUTPATIENT)
Dept: OUTPATIENT SERVICES | Facility: HOSPITAL | Age: 82
LOS: 1 days | End: 2017-09-28
Payer: MEDICARE

## 2017-09-28 LAB
BASOPHILS NFR BLD AUTO: 0.7 % — SIGNIFICANT CHANGE UP (ref 0–2)
EOSINOPHIL NFR BLD AUTO: 1.1 % — SIGNIFICANT CHANGE UP (ref 0–6)
HCT VFR BLD CALC: 36.7 % — SIGNIFICANT CHANGE UP (ref 34.5–45)
HGB BLD-MCNC: 12.2 G/DL — SIGNIFICANT CHANGE UP (ref 11.5–15.5)
LYMPHOCYTES # BLD AUTO: 23.1 % — SIGNIFICANT CHANGE UP (ref 13–44)
MCHC RBC-ENTMCNC: 27.8 PG — SIGNIFICANT CHANGE UP (ref 27–34)
MCHC RBC-ENTMCNC: 33.2 G/DL — SIGNIFICANT CHANGE UP (ref 32–36)
MCV RBC AUTO: 83.6 FL — SIGNIFICANT CHANGE UP (ref 80–100)
MONOCYTES NFR BLD AUTO: 8.2 % — SIGNIFICANT CHANGE UP (ref 2–14)
NEUTROPHILS NFR BLD AUTO: 66.9 % — SIGNIFICANT CHANGE UP (ref 43–77)
PLATELET # BLD AUTO: 259 K/UL — SIGNIFICANT CHANGE UP (ref 150–400)
RBC # BLD: 4.39 M/UL — SIGNIFICANT CHANGE UP (ref 3.8–5.2)
RBC # BLD: 4.39 M/UL — SIGNIFICANT CHANGE UP (ref 3.8–5.2)
RBC # FLD: 14.7 % — SIGNIFICANT CHANGE UP (ref 10.3–16.9)
RETICS/RBC NFR: 1.4 % — SIGNIFICANT CHANGE UP (ref 0.5–2.5)
WBC # BLD: 5.5 K/UL — SIGNIFICANT CHANGE UP (ref 3.8–10.5)
WBC # FLD AUTO: 5.5 K/UL — SIGNIFICANT CHANGE UP (ref 3.8–10.5)

## 2017-09-28 PROCEDURE — 85045 AUTOMATED RETICULOCYTE COUNT: CPT

## 2017-09-28 PROCEDURE — 85025 COMPLETE CBC W/AUTO DIFF WBC: CPT

## 2017-09-28 PROCEDURE — 36415 COLL VENOUS BLD VENIPUNCTURE: CPT

## 2017-09-29 LAB
BASOPHILS NFR BLD AUTO: 1 % — SIGNIFICANT CHANGE UP (ref 0–2)
EOSINOPHIL NFR BLD AUTO: 1 % — SIGNIFICANT CHANGE UP (ref 0–6)
HYPOCHROMIA BLD QL: SLIGHT — SIGNIFICANT CHANGE UP
LYMPHOCYTES # BLD AUTO: 21 % — SIGNIFICANT CHANGE UP (ref 13–44)
MANUAL DIF COMMENT BLD-IMP: SIGNIFICANT CHANGE UP
MANUAL SMEAR VERIFICATION: YES — SIGNIFICANT CHANGE UP
MONOCYTES NFR BLD AUTO: 8 % — SIGNIFICANT CHANGE UP (ref 2–14)
NEUTROPHILS NFR BLD AUTO: 69 % — SIGNIFICANT CHANGE UP (ref 43–77)
PLAT MORPH BLD: (no result)
RBC BLD AUTO: (no result)
SPHEROCYTES BLD QL SMEAR: SLIGHT — SIGNIFICANT CHANGE UP
TOXIC GRANULES BLD QL SMEAR: SLIGHT — SIGNIFICANT CHANGE UP

## 2017-10-02 DIAGNOSIS — R53.1 WEAKNESS: ICD-10-CM

## 2017-10-02 DIAGNOSIS — E07.9 DISORDER OF THYROID, UNSPECIFIED: ICD-10-CM

## 2017-10-02 DIAGNOSIS — D62 ACUTE POSTHEMORRHAGIC ANEMIA: ICD-10-CM

## 2017-10-02 DIAGNOSIS — D50.9 IRON DEFICIENCY ANEMIA, UNSPECIFIED: ICD-10-CM

## 2017-11-01 ENCOUNTER — OUTPATIENT (OUTPATIENT)
Dept: OUTPATIENT SERVICES | Facility: HOSPITAL | Age: 82
LOS: 1 days | End: 2017-11-01
Payer: MEDICARE

## 2017-11-01 LAB
BASOPHILS NFR BLD AUTO: 0.5 % — SIGNIFICANT CHANGE UP (ref 0–2)
EOSINOPHIL NFR BLD AUTO: 1.6 % — SIGNIFICANT CHANGE UP (ref 0–6)
HCT VFR BLD CALC: 31 % — LOW (ref 34.5–45)
HGB BLD-MCNC: 9.7 G/DL — LOW (ref 11.5–15.5)
LYMPHOCYTES # BLD AUTO: 21.4 % — SIGNIFICANT CHANGE UP (ref 13–44)
MCHC RBC-ENTMCNC: 24.6 PG — LOW (ref 27–34)
MCHC RBC-ENTMCNC: 31.3 G/DL — LOW (ref 32–36)
MCV RBC AUTO: 78.7 FL — LOW (ref 80–100)
MONOCYTES NFR BLD AUTO: 7.2 % — SIGNIFICANT CHANGE UP (ref 2–14)
NEUTROPHILS NFR BLD AUTO: 69.3 % — SIGNIFICANT CHANGE UP (ref 43–77)
PLATELET # BLD AUTO: 279 K/UL — SIGNIFICANT CHANGE UP (ref 150–400)
RBC # BLD: 3.94 M/UL — SIGNIFICANT CHANGE UP (ref 3.8–5.2)
RBC # BLD: 3.94 M/UL — SIGNIFICANT CHANGE UP (ref 3.8–5.2)
RBC # FLD: 15.8 % — SIGNIFICANT CHANGE UP (ref 10.3–16.9)
RETICS/RBC NFR: 2.3 % — SIGNIFICANT CHANGE UP (ref 0.5–2.5)
WBC # BLD: 6.3 K/UL — SIGNIFICANT CHANGE UP (ref 3.8–10.5)
WBC # FLD AUTO: 6.3 K/UL — SIGNIFICANT CHANGE UP (ref 3.8–10.5)

## 2017-11-01 PROCEDURE — 36415 COLL VENOUS BLD VENIPUNCTURE: CPT

## 2017-11-01 PROCEDURE — 85045 AUTOMATED RETICULOCYTE COUNT: CPT

## 2017-11-01 PROCEDURE — 85025 COMPLETE CBC W/AUTO DIFF WBC: CPT

## 2017-11-02 LAB
EOSINOPHIL NFR BLD AUTO: 1 % — SIGNIFICANT CHANGE UP (ref 0–6)
HYPOCHROMIA BLD QL: SIGNIFICANT CHANGE UP
LYMPHOCYTES # BLD AUTO: 19 % — SIGNIFICANT CHANGE UP (ref 13–44)
MANUAL DIF COMMENT BLD-IMP: SIGNIFICANT CHANGE UP
MANUAL SMEAR VERIFICATION: YES — SIGNIFICANT CHANGE UP
MICROCYTES BLD QL: SLIGHT — SIGNIFICANT CHANGE UP
MONOCYTES NFR BLD AUTO: 7 % — SIGNIFICANT CHANGE UP (ref 2–14)
NEUTROPHILS NFR BLD AUTO: 73 % — SIGNIFICANT CHANGE UP (ref 43–77)
PLAT MORPH BLD: (no result)
POLYCHROMASIA BLD QL SMEAR: SLIGHT — SIGNIFICANT CHANGE UP
RBC BLD AUTO: (no result)
SCHISTOCYTES BLD QL AUTO: SIGNIFICANT CHANGE UP
SPHEROCYTES BLD QL SMEAR: SLIGHT — SIGNIFICANT CHANGE UP
TARGETS BLD QL SMEAR: SLIGHT — SIGNIFICANT CHANGE UP

## 2017-11-06 ENCOUNTER — APPOINTMENT (OUTPATIENT)
Dept: INFUSION THERAPY | Facility: HOSPITAL | Age: 82
End: 2017-11-06

## 2017-11-06 ENCOUNTER — OUTPATIENT (OUTPATIENT)
Dept: OUTPATIENT SERVICES | Facility: HOSPITAL | Age: 82
LOS: 1 days | End: 2017-11-06
Payer: MEDICARE

## 2017-11-06 PROCEDURE — 96365 THER/PROPH/DIAG IV INF INIT: CPT

## 2017-11-06 RX ORDER — FERUMOXYTOL 510 MG/17ML
510 INJECTION INTRAVENOUS ONCE
Qty: 0 | Refills: 0 | Status: DISCONTINUED | OUTPATIENT
Start: 2017-11-06 | End: 2017-11-21

## 2017-11-07 DIAGNOSIS — D50.9 IRON DEFICIENCY ANEMIA, UNSPECIFIED: ICD-10-CM

## 2017-11-07 DIAGNOSIS — K90.9 INTESTINAL MALABSORPTION, UNSPECIFIED: ICD-10-CM

## 2017-11-07 DIAGNOSIS — E07.9 DISORDER OF THYROID, UNSPECIFIED: ICD-10-CM

## 2017-11-07 DIAGNOSIS — R53.1 WEAKNESS: ICD-10-CM

## 2017-11-10 RX ORDER — FERUMOXYTOL 510 MG/17ML
510 INJECTION INTRAVENOUS ONCE
Qty: 0 | Refills: 0 | Status: DISCONTINUED | OUTPATIENT
Start: 2017-11-13 | End: 2017-11-28

## 2017-11-13 ENCOUNTER — APPOINTMENT (OUTPATIENT)
Dept: INFUSION THERAPY | Facility: HOSPITAL | Age: 82
End: 2017-11-13

## 2017-11-13 ENCOUNTER — OUTPATIENT (OUTPATIENT)
Dept: OUTPATIENT SERVICES | Facility: HOSPITAL | Age: 82
LOS: 1 days | End: 2017-11-13
Payer: MEDICARE

## 2017-11-13 DIAGNOSIS — D50.9 IRON DEFICIENCY ANEMIA, UNSPECIFIED: ICD-10-CM

## 2017-11-13 PROCEDURE — 96365 THER/PROPH/DIAG IV INF INIT: CPT

## 2017-11-15 DIAGNOSIS — K90.9 INTESTINAL MALABSORPTION, UNSPECIFIED: ICD-10-CM

## 2018-01-18 ENCOUNTER — OUTPATIENT (OUTPATIENT)
Dept: OUTPATIENT SERVICES | Facility: HOSPITAL | Age: 83
LOS: 1 days | End: 2018-01-18
Payer: MEDICARE

## 2018-01-18 DIAGNOSIS — R53.1 WEAKNESS: ICD-10-CM

## 2018-01-18 DIAGNOSIS — D50.9 IRON DEFICIENCY ANEMIA, UNSPECIFIED: ICD-10-CM

## 2018-01-18 DIAGNOSIS — D50.0 IRON DEFICIENCY ANEMIA SECONDARY TO BLOOD LOSS (CHRONIC): ICD-10-CM

## 2018-01-18 DIAGNOSIS — E07.9 DISORDER OF THYROID, UNSPECIFIED: ICD-10-CM

## 2018-01-18 LAB
BASOPHILS NFR BLD AUTO: 0.4 % — SIGNIFICANT CHANGE UP (ref 0–2)
EOSINOPHIL NFR BLD AUTO: 2 % — SIGNIFICANT CHANGE UP (ref 0–6)
HCT VFR BLD CALC: 42.2 % — SIGNIFICANT CHANGE UP (ref 34.5–45)
HGB BLD-MCNC: 13.9 G/DL — SIGNIFICANT CHANGE UP (ref 11.5–15.5)
LYMPHOCYTES # BLD AUTO: 20.2 % — SIGNIFICANT CHANGE UP (ref 13–44)
MCHC RBC-ENTMCNC: 27.1 PG — SIGNIFICANT CHANGE UP (ref 27–34)
MCHC RBC-ENTMCNC: 32.9 G/DL — SIGNIFICANT CHANGE UP (ref 32–36)
MCV RBC AUTO: 82.4 FL — SIGNIFICANT CHANGE UP (ref 80–100)
MONOCYTES NFR BLD AUTO: 9 % — SIGNIFICANT CHANGE UP (ref 2–14)
NEUTROPHILS NFR BLD AUTO: 68.4 % — SIGNIFICANT CHANGE UP (ref 43–77)
PLATELET # BLD AUTO: 214 K/UL — SIGNIFICANT CHANGE UP (ref 150–400)
RBC # BLD: 5.12 M/UL — SIGNIFICANT CHANGE UP (ref 3.8–5.2)
RBC # BLD: 5.12 M/UL — SIGNIFICANT CHANGE UP (ref 3.8–5.2)
RBC # FLD: 23 % — HIGH (ref 10.3–16.9)
RETICS/RBC NFR: 1 % — SIGNIFICANT CHANGE UP (ref 0.5–2.5)
WBC # BLD: 7.4 K/UL — SIGNIFICANT CHANGE UP (ref 3.8–10.5)
WBC # FLD AUTO: 7.4 K/UL — SIGNIFICANT CHANGE UP (ref 3.8–10.5)

## 2018-01-18 PROCEDURE — 85025 COMPLETE CBC W/AUTO DIFF WBC: CPT

## 2018-01-18 PROCEDURE — 85045 AUTOMATED RETICULOCYTE COUNT: CPT

## 2018-01-18 PROCEDURE — 36415 COLL VENOUS BLD VENIPUNCTURE: CPT

## 2018-01-19 LAB
ANISOCYTOSIS BLD QL: SIGNIFICANT CHANGE UP
EOSINOPHIL NFR BLD AUTO: 2 % — SIGNIFICANT CHANGE UP (ref 0–6)
HYPOCHROMIA BLD QL: SLIGHT — SIGNIFICANT CHANGE UP
LYMPHOCYTES # BLD AUTO: 21 % — SIGNIFICANT CHANGE UP (ref 13–44)
MANUAL DIF COMMENT BLD-IMP: SIGNIFICANT CHANGE UP
MANUAL SMEAR VERIFICATION: YES — SIGNIFICANT CHANGE UP
METAMYELOCYTES # FLD: 1 % — HIGH
MONOCYTES NFR BLD AUTO: 7 % — SIGNIFICANT CHANGE UP (ref 2–14)
MYELOCYTES NFR BLD: 1 % — HIGH
NEUTROPHILS NFR BLD AUTO: 66 % — SIGNIFICANT CHANGE UP (ref 43–77)
NEUTS BAND # BLD: 2 % — SIGNIFICANT CHANGE UP
PLAT MORPH BLD: (no result)
RBC BLD AUTO: (no result)
TARGETS BLD QL SMEAR: SLIGHT — SIGNIFICANT CHANGE UP

## 2018-03-08 ENCOUNTER — OUTPATIENT (OUTPATIENT)
Dept: OUTPATIENT SERVICES | Facility: HOSPITAL | Age: 83
LOS: 1 days | End: 2018-03-08
Payer: MEDICARE

## 2018-03-08 DIAGNOSIS — E07.9 DISORDER OF THYROID, UNSPECIFIED: ICD-10-CM

## 2018-03-08 DIAGNOSIS — D50.9 IRON DEFICIENCY ANEMIA, UNSPECIFIED: ICD-10-CM

## 2018-03-08 DIAGNOSIS — I10 ESSENTIAL (PRIMARY) HYPERTENSION: ICD-10-CM

## 2018-03-08 DIAGNOSIS — D53.9 NUTRITIONAL ANEMIA, UNSPECIFIED: ICD-10-CM

## 2018-03-08 LAB
BASOPHILS NFR BLD AUTO: 0.4 % — SIGNIFICANT CHANGE UP (ref 0–2)
EOSINOPHIL NFR BLD AUTO: 0.9 % — SIGNIFICANT CHANGE UP (ref 0–6)
HCT VFR BLD CALC: 40.5 % — SIGNIFICANT CHANGE UP (ref 34.5–45)
HGB BLD-MCNC: 13.4 G/DL — SIGNIFICANT CHANGE UP (ref 11.5–15.5)
LYMPHOCYTES # BLD AUTO: 13.9 % — SIGNIFICANT CHANGE UP (ref 13–44)
MCHC RBC-ENTMCNC: 29.9 PG — SIGNIFICANT CHANGE UP (ref 27–34)
MCHC RBC-ENTMCNC: 33.1 G/DL — SIGNIFICANT CHANGE UP (ref 32–36)
MCV RBC AUTO: 90.4 FL — SIGNIFICANT CHANGE UP (ref 80–100)
MONOCYTES NFR BLD AUTO: 8.8 % — SIGNIFICANT CHANGE UP (ref 2–14)
NEUTROPHILS NFR BLD AUTO: 76 % — SIGNIFICANT CHANGE UP (ref 43–77)
PLATELET # BLD AUTO: 255 K/UL — SIGNIFICANT CHANGE UP (ref 150–400)
RBC # BLD: 4.48 M/UL — SIGNIFICANT CHANGE UP (ref 3.8–5.2)
RBC # BLD: 4.48 M/UL — SIGNIFICANT CHANGE UP (ref 3.8–5.2)
RBC # FLD: 14.7 % — SIGNIFICANT CHANGE UP (ref 10.3–16.9)
RETICS/RBC NFR: 1.7 % — SIGNIFICANT CHANGE UP (ref 0.5–2.5)
WBC # BLD: 7.8 K/UL — SIGNIFICANT CHANGE UP (ref 3.8–10.5)
WBC # FLD AUTO: 7.8 K/UL — SIGNIFICANT CHANGE UP (ref 3.8–10.5)

## 2018-03-08 PROCEDURE — 36415 COLL VENOUS BLD VENIPUNCTURE: CPT

## 2018-03-08 PROCEDURE — 85025 COMPLETE CBC W/AUTO DIFF WBC: CPT

## 2018-03-08 PROCEDURE — 85045 AUTOMATED RETICULOCYTE COUNT: CPT

## 2018-03-09 LAB
EOSINOPHIL NFR BLD AUTO: 1 % — SIGNIFICANT CHANGE UP (ref 0–6)
LYMPHOCYTES # BLD AUTO: 16 % — SIGNIFICANT CHANGE UP (ref 13–44)
MANUAL DIF COMMENT BLD-IMP: SIGNIFICANT CHANGE UP
MANUAL SMEAR VERIFICATION: YES — SIGNIFICANT CHANGE UP
METAMYELOCYTES # FLD: 1 % — HIGH
MONOCYTES NFR BLD AUTO: 9 % — SIGNIFICANT CHANGE UP (ref 2–14)
NEUTROPHILS NFR BLD AUTO: 71 % — SIGNIFICANT CHANGE UP (ref 43–77)
NEUTS BAND # BLD: 2 % — SIGNIFICANT CHANGE UP
PLAT MORPH BLD: (no result)
RBC BLD AUTO: NORMAL — SIGNIFICANT CHANGE UP

## 2018-04-03 ENCOUNTER — OUTPATIENT (OUTPATIENT)
Dept: OUTPATIENT SERVICES | Facility: HOSPITAL | Age: 83
LOS: 1 days | End: 2018-04-03
Payer: MEDICARE

## 2018-04-03 DIAGNOSIS — D50.9 IRON DEFICIENCY ANEMIA, UNSPECIFIED: ICD-10-CM

## 2018-04-03 DIAGNOSIS — I99.8 OTHER DISORDER OF CIRCULATORY SYSTEM: ICD-10-CM

## 2018-04-03 DIAGNOSIS — E07.9 DISORDER OF THYROID, UNSPECIFIED: ICD-10-CM

## 2018-04-03 DIAGNOSIS — D50.0 IRON DEFICIENCY ANEMIA SECONDARY TO BLOOD LOSS (CHRONIC): ICD-10-CM

## 2018-04-03 LAB
BASOPHILS NFR BLD AUTO: 0.6 % — SIGNIFICANT CHANGE UP (ref 0–2)
EOSINOPHIL NFR BLD AUTO: 1 % — SIGNIFICANT CHANGE UP (ref 0–6)
HCT VFR BLD CALC: 40.7 % — SIGNIFICANT CHANGE UP (ref 34.5–45)
HGB BLD-MCNC: 13.3 G/DL — SIGNIFICANT CHANGE UP (ref 11.5–15.5)
LYMPHOCYTES # BLD AUTO: 17.5 % — SIGNIFICANT CHANGE UP (ref 13–44)
MCHC RBC-ENTMCNC: 29.6 PG — SIGNIFICANT CHANGE UP (ref 27–34)
MCHC RBC-ENTMCNC: 32.7 G/DL — SIGNIFICANT CHANGE UP (ref 32–36)
MCV RBC AUTO: 90.4 FL — SIGNIFICANT CHANGE UP (ref 80–100)
MONOCYTES NFR BLD AUTO: 6.2 % — SIGNIFICANT CHANGE UP (ref 2–14)
NEUTROPHILS NFR BLD AUTO: 74.7 % — SIGNIFICANT CHANGE UP (ref 43–77)
PLATELET # BLD AUTO: 269 K/UL — SIGNIFICANT CHANGE UP (ref 150–400)
RBC # BLD: 4.5 M/UL — SIGNIFICANT CHANGE UP (ref 3.8–5.2)
RBC # BLD: 4.5 M/UL — SIGNIFICANT CHANGE UP (ref 3.8–5.2)
RBC # FLD: 13.9 % — SIGNIFICANT CHANGE UP (ref 10.3–16.9)
RETICS/RBC NFR: 1.7 % — SIGNIFICANT CHANGE UP (ref 0.5–2.5)
WBC # BLD: 8.9 K/UL — SIGNIFICANT CHANGE UP (ref 3.8–10.5)
WBC # FLD AUTO: 8.9 K/UL — SIGNIFICANT CHANGE UP (ref 3.8–10.5)

## 2018-04-03 PROCEDURE — 85025 COMPLETE CBC W/AUTO DIFF WBC: CPT

## 2018-04-03 PROCEDURE — 85045 AUTOMATED RETICULOCYTE COUNT: CPT

## 2018-04-03 PROCEDURE — 36415 COLL VENOUS BLD VENIPUNCTURE: CPT

## 2018-04-04 LAB
BASOPHILS NFR BLD AUTO: 1 % — SIGNIFICANT CHANGE UP (ref 0–2)
EOSINOPHIL NFR BLD AUTO: 1 % — SIGNIFICANT CHANGE UP (ref 0–6)
LYMPHOCYTES # BLD AUTO: 17 % — SIGNIFICANT CHANGE UP (ref 13–44)
MANUAL DIF COMMENT BLD-IMP: SIGNIFICANT CHANGE UP
MANUAL SMEAR VERIFICATION: YES — SIGNIFICANT CHANGE UP
MONOCYTES NFR BLD AUTO: 8 % — SIGNIFICANT CHANGE UP (ref 2–14)
NEUTROPHILS NFR BLD AUTO: 73 % — SIGNIFICANT CHANGE UP (ref 43–77)
PLAT MORPH BLD: (no result)
RBC BLD AUTO: NORMAL — SIGNIFICANT CHANGE UP

## 2018-04-17 ENCOUNTER — OUTPATIENT (OUTPATIENT)
Dept: OUTPATIENT SERVICES | Facility: HOSPITAL | Age: 83
LOS: 1 days | End: 2018-04-17
Payer: MEDICARE

## 2018-04-17 LAB
BASOPHILS NFR BLD AUTO: 0.5 % — SIGNIFICANT CHANGE UP (ref 0–2)
EOSINOPHIL NFR BLD AUTO: 1.6 % — SIGNIFICANT CHANGE UP (ref 0–6)
HCT VFR BLD CALC: 28.9 % — LOW (ref 34.5–45)
HGB BLD-MCNC: 9 G/DL — LOW (ref 11.5–15.5)
LYMPHOCYTES # BLD AUTO: 20.7 % — SIGNIFICANT CHANGE UP (ref 13–44)
MCHC RBC-ENTMCNC: 28.1 PG — SIGNIFICANT CHANGE UP (ref 27–34)
MCHC RBC-ENTMCNC: 31.1 G/DL — LOW (ref 32–36)
MCV RBC AUTO: 90.3 FL — SIGNIFICANT CHANGE UP (ref 80–100)
MONOCYTES NFR BLD AUTO: 9.1 % — SIGNIFICANT CHANGE UP (ref 2–14)
NEUTROPHILS NFR BLD AUTO: 68.1 % — SIGNIFICANT CHANGE UP (ref 43–77)
PLATELET # BLD AUTO: 382 K/UL — SIGNIFICANT CHANGE UP (ref 150–400)
RBC # BLD: 3.2 M/UL — LOW (ref 3.8–5.2)
RBC # BLD: 3.2 M/UL — LOW (ref 3.8–5.2)
RBC # FLD: 15.6 % — SIGNIFICANT CHANGE UP (ref 10.3–16.9)
RETICS/RBC NFR: 5.3 % — HIGH (ref 0.5–2.5)
WBC # BLD: 6.4 K/UL — SIGNIFICANT CHANGE UP (ref 3.8–10.5)
WBC # FLD AUTO: 6.4 K/UL — SIGNIFICANT CHANGE UP (ref 3.8–10.5)

## 2018-04-17 PROCEDURE — 85025 COMPLETE CBC W/AUTO DIFF WBC: CPT

## 2018-04-17 PROCEDURE — 99213 OFFICE O/P EST LOW 20 MIN: CPT

## 2018-04-17 PROCEDURE — 36415 COLL VENOUS BLD VENIPUNCTURE: CPT

## 2018-04-17 PROCEDURE — 85045 AUTOMATED RETICULOCYTE COUNT: CPT

## 2018-04-18 DIAGNOSIS — R19.7 DIARRHEA, UNSPECIFIED: ICD-10-CM

## 2018-04-18 DIAGNOSIS — D50.9 IRON DEFICIENCY ANEMIA, UNSPECIFIED: ICD-10-CM

## 2018-04-18 DIAGNOSIS — R53.1 WEAKNESS: ICD-10-CM

## 2018-04-18 DIAGNOSIS — K90.9 INTESTINAL MALABSORPTION, UNSPECIFIED: ICD-10-CM

## 2018-04-20 ENCOUNTER — OUTPATIENT (OUTPATIENT)
Dept: OUTPATIENT SERVICES | Facility: HOSPITAL | Age: 83
LOS: 1 days | End: 2018-04-20
Payer: MEDICARE

## 2018-04-20 ENCOUNTER — APPOINTMENT (OUTPATIENT)
Dept: INFUSION THERAPY | Facility: HOSPITAL | Age: 83
End: 2018-04-20

## 2018-04-20 DIAGNOSIS — D50.9 IRON DEFICIENCY ANEMIA, UNSPECIFIED: ICD-10-CM

## 2018-04-20 PROCEDURE — 96365 THER/PROPH/DIAG IV INF INIT: CPT

## 2018-04-20 RX ORDER — FERUMOXYTOL 510 MG/17ML
510 INJECTION INTRAVENOUS ONCE
Qty: 0 | Refills: 0 | Status: DISCONTINUED | OUTPATIENT
Start: 2018-04-20 | End: 2018-05-05

## 2018-04-26 DIAGNOSIS — K90.9 INTESTINAL MALABSORPTION, UNSPECIFIED: ICD-10-CM

## 2018-04-26 RX ORDER — FERUMOXYTOL 510 MG/17ML
510 INJECTION INTRAVENOUS ONCE
Qty: 0 | Refills: 0 | Status: DISCONTINUED | OUTPATIENT
Start: 2018-04-27 | End: 2018-05-12

## 2018-04-27 ENCOUNTER — APPOINTMENT (OUTPATIENT)
Dept: INFUSION THERAPY | Facility: HOSPITAL | Age: 83
End: 2018-04-27

## 2018-04-27 ENCOUNTER — OUTPATIENT (OUTPATIENT)
Dept: OUTPATIENT SERVICES | Facility: HOSPITAL | Age: 83
LOS: 1 days | End: 2018-04-27
Payer: MEDICARE

## 2018-04-27 DIAGNOSIS — D50.9 IRON DEFICIENCY ANEMIA, UNSPECIFIED: ICD-10-CM

## 2018-04-27 PROCEDURE — 96365 THER/PROPH/DIAG IV INF INIT: CPT

## 2018-05-04 DIAGNOSIS — K90.9 INTESTINAL MALABSORPTION, UNSPECIFIED: ICD-10-CM

## 2018-05-14 ENCOUNTER — OUTPATIENT (OUTPATIENT)
Dept: OUTPATIENT SERVICES | Facility: HOSPITAL | Age: 83
LOS: 1 days | End: 2018-05-14
Payer: MEDICARE

## 2018-05-14 LAB
BASOPHILS NFR BLD AUTO: 0.4 % — SIGNIFICANT CHANGE UP (ref 0–2)
EOSINOPHIL NFR BLD AUTO: 1.6 % — SIGNIFICANT CHANGE UP (ref 0–6)
HCT VFR BLD CALC: 40.9 % — SIGNIFICANT CHANGE UP (ref 34.5–45)
HGB BLD-MCNC: 13 G/DL — SIGNIFICANT CHANGE UP (ref 11.5–15.5)
LYMPHOCYTES # BLD AUTO: 17.4 % — SIGNIFICANT CHANGE UP (ref 13–44)
MCHC RBC-ENTMCNC: 28.1 PG — SIGNIFICANT CHANGE UP (ref 27–34)
MCHC RBC-ENTMCNC: 31.8 G/DL — LOW (ref 32–36)
MCV RBC AUTO: 88.3 FL — SIGNIFICANT CHANGE UP (ref 80–100)
MONOCYTES NFR BLD AUTO: 5.8 % — SIGNIFICANT CHANGE UP (ref 2–14)
NEUTROPHILS NFR BLD AUTO: 74.8 % — SIGNIFICANT CHANGE UP (ref 43–77)
PLATELET # BLD AUTO: 319 K/UL — SIGNIFICANT CHANGE UP (ref 150–400)
RBC # BLD: 4.63 M/UL — SIGNIFICANT CHANGE UP (ref 3.8–5.2)
RBC # BLD: 4.63 M/UL — SIGNIFICANT CHANGE UP (ref 3.8–5.2)
RBC # FLD: 18.9 % — HIGH (ref 10.3–16.9)
RETICS/RBC NFR: 0.9 % — SIGNIFICANT CHANGE UP (ref 0.5–2.5)
WBC # BLD: 6.8 K/UL — SIGNIFICANT CHANGE UP (ref 3.8–10.5)
WBC # FLD AUTO: 6.8 K/UL — SIGNIFICANT CHANGE UP (ref 3.8–10.5)

## 2018-05-14 PROCEDURE — 85025 COMPLETE CBC W/AUTO DIFF WBC: CPT

## 2018-05-14 PROCEDURE — 36415 COLL VENOUS BLD VENIPUNCTURE: CPT

## 2018-05-14 PROCEDURE — 99213 OFFICE O/P EST LOW 20 MIN: CPT

## 2018-05-14 PROCEDURE — 85045 AUTOMATED RETICULOCYTE COUNT: CPT

## 2018-05-15 DIAGNOSIS — R19.7 DIARRHEA, UNSPECIFIED: ICD-10-CM

## 2018-05-15 DIAGNOSIS — R53.1 WEAKNESS: ICD-10-CM

## 2018-05-15 DIAGNOSIS — D50.9 IRON DEFICIENCY ANEMIA, UNSPECIFIED: ICD-10-CM

## 2018-05-15 DIAGNOSIS — K90.9 INTESTINAL MALABSORPTION, UNSPECIFIED: ICD-10-CM

## 2018-06-21 ENCOUNTER — OUTPATIENT (OUTPATIENT)
Dept: OUTPATIENT SERVICES | Facility: HOSPITAL | Age: 83
LOS: 1 days | End: 2018-06-21
Payer: MEDICARE

## 2018-06-21 LAB
BASOPHILS NFR BLD AUTO: 0.3 % — SIGNIFICANT CHANGE UP (ref 0–2)
EOSINOPHIL NFR BLD AUTO: 1.5 % — SIGNIFICANT CHANGE UP (ref 0–6)
HCT VFR BLD CALC: 38.4 % — SIGNIFICANT CHANGE UP (ref 34.5–45)
HGB BLD-MCNC: 12.5 G/DL — SIGNIFICANT CHANGE UP (ref 11.5–15.5)
LYMPHOCYTES # BLD AUTO: 19.4 % — SIGNIFICANT CHANGE UP (ref 13–44)
MCHC RBC-ENTMCNC: 27.9 PG — SIGNIFICANT CHANGE UP (ref 27–34)
MCHC RBC-ENTMCNC: 32.6 G/DL — SIGNIFICANT CHANGE UP (ref 32–36)
MCV RBC AUTO: 85.7 FL — SIGNIFICANT CHANGE UP (ref 80–100)
MONOCYTES NFR BLD AUTO: 9.6 % — SIGNIFICANT CHANGE UP (ref 2–14)
NEUTROPHILS NFR BLD AUTO: 69.2 % — SIGNIFICANT CHANGE UP (ref 43–77)
PLATELET # BLD AUTO: 210 K/UL — SIGNIFICANT CHANGE UP (ref 150–400)
RBC # BLD: 4.48 M/UL — SIGNIFICANT CHANGE UP (ref 3.8–5.2)
RBC # BLD: 4.48 M/UL — SIGNIFICANT CHANGE UP (ref 3.8–5.2)
RBC # FLD: 18.2 % — HIGH (ref 10.3–16.9)
RETICS/RBC NFR: 1 % — SIGNIFICANT CHANGE UP (ref 0.5–2.5)
WBC # BLD: 6.1 K/UL — SIGNIFICANT CHANGE UP (ref 3.8–10.5)
WBC # FLD AUTO: 6.1 K/UL — SIGNIFICANT CHANGE UP (ref 3.8–10.5)

## 2018-06-21 PROCEDURE — 99214 OFFICE O/P EST MOD 30 MIN: CPT

## 2018-06-21 PROCEDURE — 36415 COLL VENOUS BLD VENIPUNCTURE: CPT

## 2018-06-21 PROCEDURE — 85045 AUTOMATED RETICULOCYTE COUNT: CPT

## 2018-06-21 PROCEDURE — 85025 COMPLETE CBC W/AUTO DIFF WBC: CPT

## 2018-06-25 DIAGNOSIS — R19.7 DIARRHEA, UNSPECIFIED: ICD-10-CM

## 2018-06-25 DIAGNOSIS — D50.9 IRON DEFICIENCY ANEMIA, UNSPECIFIED: ICD-10-CM

## 2018-06-25 DIAGNOSIS — R53.1 WEAKNESS: ICD-10-CM

## 2018-06-25 DIAGNOSIS — K90.9 INTESTINAL MALABSORPTION, UNSPECIFIED: ICD-10-CM

## 2018-07-03 ENCOUNTER — RECORD ABSTRACTING (OUTPATIENT)
Age: 83
End: 2018-07-03

## 2018-07-03 DIAGNOSIS — Z87.39 PERSONAL HISTORY OF OTHER DISEASES OF THE MUSCULOSKELETAL SYSTEM AND CONNECTIVE TISSUE: ICD-10-CM

## 2018-07-03 DIAGNOSIS — Z97.4 PRESENCE OF EXTERNAL HEARING-AID: ICD-10-CM

## 2018-07-03 DIAGNOSIS — Z87.820 PERSONAL HISTORY OF TRAUMATIC BRAIN INJURY: ICD-10-CM

## 2018-07-03 DIAGNOSIS — Z67.41 TYPE O BLOOD, RH NEGATIVE: ICD-10-CM

## 2018-07-03 DIAGNOSIS — Z92.89 PERSONAL HISTORY OF OTHER MEDICAL TREATMENT: ICD-10-CM

## 2018-07-03 DIAGNOSIS — Z80.0 FAMILY HISTORY OF MALIGNANT NEOPLASM OF DIGESTIVE ORGANS: ICD-10-CM

## 2018-07-03 DIAGNOSIS — Z82.3 FAMILY HISTORY OF STROKE: ICD-10-CM

## 2018-07-03 DIAGNOSIS — D28.2: ICD-10-CM

## 2018-07-03 DIAGNOSIS — Z86.69 PERSONAL HISTORY OF OTHER DISEASES OF THE NERVOUS SYSTEM AND SENSE ORGANS: ICD-10-CM

## 2018-07-03 RX ORDER — CHLORHEXIDINE GLUCONATE 4 %
400 LIQUID (ML) TOPICAL
Refills: 0 | Status: ACTIVE | COMMUNITY

## 2018-07-03 RX ORDER — CYANOCOBALAMIN (VITAMIN B-12) 500 MCG
400 LOZENGE ORAL
Refills: 0 | Status: ACTIVE | COMMUNITY

## 2018-07-03 RX ORDER — METRONIDAZOLE 10 MG/G
1 GEL TOPICAL
Refills: 0 | Status: ACTIVE | COMMUNITY

## 2018-07-03 RX ORDER — UBIDECARENONE/VIT E ACET 100MG-5
CAPSULE ORAL
Refills: 0 | Status: ACTIVE | COMMUNITY

## 2018-07-03 RX ORDER — COLD-HOT PACK
50 EACH MISCELLANEOUS
Refills: 0 | Status: ACTIVE | COMMUNITY

## 2018-07-03 RX ORDER — LIPASE/PROTEASE/AMYLASE 249 MG
33.2-10-37.5 CAPSULE,DELAYED RELEASE (ENTERIC COATED) ORAL
Refills: 0 | Status: ACTIVE | COMMUNITY

## 2018-07-03 RX ORDER — LUTEIN 6 MG
20 TABLET ORAL
Refills: 0 | Status: ACTIVE | COMMUNITY

## 2018-07-03 RX ORDER — BACILLUS COAGULANS/INULIN 1B-250 MG
CAPSULE ORAL
Refills: 0 | Status: ACTIVE | COMMUNITY

## 2018-07-26 ENCOUNTER — APPOINTMENT (OUTPATIENT)
Dept: HEMATOLOGY ONCOLOGY | Facility: CLINIC | Age: 83
End: 2018-07-26
Payer: MEDICARE

## 2018-07-26 VITALS
TEMPERATURE: 98.1 F | BODY MASS INDEX: 18.12 KG/M2 | HEIGHT: 61 IN | SYSTOLIC BLOOD PRESSURE: 136 MMHG | OXYGEN SATURATION: 97 % | DIASTOLIC BLOOD PRESSURE: 80 MMHG | WEIGHT: 96 LBS | HEART RATE: 75 BPM

## 2018-07-26 DIAGNOSIS — E73.9 LACTOSE INTOLERANCE, UNSPECIFIED: ICD-10-CM

## 2018-07-26 DIAGNOSIS — Z78.9 OTHER SPECIFIED HEALTH STATUS: ICD-10-CM

## 2018-07-26 LAB
BASOPHILS NFR BLD AUTO: 0.6 %
EOSINOPHIL NFR BLD AUTO: 1.3 %
HCT VFR BLD CALC: 42.4 %
HGB BLD-MCNC: 13.3 G/DL
LYMPHOCYTES NFR BLD AUTO: 21.2 %
MAN DIFF?: NO
MCHC RBC-ENTMCNC: 28.1 PG
MCHC RBC-ENTMCNC: 31.4 G/DL
MCV RBC AUTO: 89.5 FL
MONOCYTES NFR BLD AUTO: 7.2 %
NEUTROPHILS NFR BLD AUTO: 69.7 %
PLATELET # BLD AUTO: 239 K/UL
RBC # BLD: 4.74 M/UL
RBC # BLD: 4.74 M/UL
RBC # FLD: 16.7 %
RETICS # AUTO: 1.5 %
WBC # FLD AUTO: 6.7 K/UL

## 2018-07-26 PROCEDURE — 36415 COLL VENOUS BLD VENIPUNCTURE: CPT

## 2018-07-26 PROCEDURE — 99214 OFFICE O/P EST MOD 30 MIN: CPT | Mod: 25

## 2018-07-27 ENCOUNTER — LABORATORY RESULT (OUTPATIENT)
Age: 83
End: 2018-07-27

## 2018-07-30 LAB
ALBUMIN SERPL ELPH-MCNC: 4.5 G/DL
ALP BLD-CCNC: 96 U/L
ALT SERPL-CCNC: 68 U/L
ANION GAP SERPL CALC-SCNC: 17 MMOL/L
AST SERPL-CCNC: 57 U/L
BILIRUB SERPL-MCNC: 0.3 MG/DL
BUN SERPL-MCNC: 16 MG/DL
CALCIUM SERPL-MCNC: 9.6 MG/DL
CHLORIDE SERPL-SCNC: 101 MMOL/L
CO2 SERPL-SCNC: 26 MMOL/L
CREAT SERPL-MCNC: 0.6 MG/DL
FERRITIN SERPL-MCNC: 80 NG/ML
GLUCOSE SERPL-MCNC: 80 MG/DL
IRON SATN MFR SERPL: 20 %
IRON SERPL-MCNC: 69 UG/DL
POTASSIUM SERPL-SCNC: 5 MMOL/L
PROT SERPL-MCNC: 7.1 G/DL
SODIUM SERPL-SCNC: 144 MMOL/L
TIBC SERPL-MCNC: 349 UG/DL
UIBC SERPL-MCNC: 280 UG/DL

## 2018-08-28 ENCOUNTER — APPOINTMENT (OUTPATIENT)
Dept: HEMATOLOGY ONCOLOGY | Facility: CLINIC | Age: 83
End: 2018-08-28
Payer: MEDICARE

## 2018-08-28 VITALS
OXYGEN SATURATION: 95 % | HEIGHT: 61 IN | BODY MASS INDEX: 17.75 KG/M2 | DIASTOLIC BLOOD PRESSURE: 70 MMHG | HEART RATE: 90 BPM | WEIGHT: 94 LBS | TEMPERATURE: 98.1 F | SYSTOLIC BLOOD PRESSURE: 132 MMHG

## 2018-08-28 LAB
BASOPHILS NFR BLD AUTO: 0.3 %
EOSINOPHIL NFR BLD AUTO: 1.2 %
HCT VFR BLD CALC: 40.4 %
HGB BLD-MCNC: 12.7 G/DL
LYMPHOCYTES NFR BLD AUTO: 19.2 %
MAN DIFF?: NO
MCHC RBC-ENTMCNC: 29.1 PG
MCHC RBC-ENTMCNC: 31.4 G/DL
MCV RBC AUTO: 92.4 FL
MONOCYTES NFR BLD AUTO: 7.2 %
NEUTROPHILS NFR BLD AUTO: 72.1 %
PLATELET # BLD AUTO: 282 K/UL
RBC # BLD: 4.37 M/UL
RBC # FLD: 15 %
WBC # FLD AUTO: 7.8 K/UL

## 2018-08-28 PROCEDURE — 99214 OFFICE O/P EST MOD 30 MIN: CPT | Mod: 25

## 2018-08-28 PROCEDURE — 36415 COLL VENOUS BLD VENIPUNCTURE: CPT

## 2018-08-29 ENCOUNTER — LABORATORY RESULT (OUTPATIENT)
Age: 83
End: 2018-08-29

## 2018-08-29 LAB
ALBUMIN SERPL ELPH-MCNC: 4.1 G/DL
ALP BLD-CCNC: 92 U/L
ALT SERPL-CCNC: 60 U/L
ANION GAP SERPL CALC-SCNC: 15 MMOL/L
AST SERPL-CCNC: 41 U/L
BILIRUB SERPL-MCNC: 0.2 MG/DL
BUN SERPL-MCNC: 25 MG/DL
CALCIUM SERPL-MCNC: 9.7 MG/DL
CHLORIDE SERPL-SCNC: 102 MMOL/L
CO2 SERPL-SCNC: 26 MMOL/L
CREAT SERPL-MCNC: 0.65 MG/DL
FERRITIN SERPL-MCNC: 37 NG/ML
GLUCOSE SERPL-MCNC: 76 MG/DL
IRON SATN MFR SERPL: 14 %
IRON SERPL-MCNC: 49 UG/DL
POTASSIUM SERPL-SCNC: 5.3 MMOL/L
PROT SERPL-MCNC: 6.6 G/DL
RBC # BLD: 4.37 M/UL
RETICS # AUTO: 2.1 %
SODIUM SERPL-SCNC: 143 MMOL/L
TIBC SERPL-MCNC: 340 UG/DL
UIBC SERPL-MCNC: 291 UG/DL

## 2018-09-17 ENCOUNTER — APPOINTMENT (OUTPATIENT)
Dept: HEMATOLOGY ONCOLOGY | Facility: CLINIC | Age: 83
End: 2018-09-17
Payer: MEDICARE

## 2018-09-17 VITALS
DIASTOLIC BLOOD PRESSURE: 70 MMHG | OXYGEN SATURATION: 98 % | WEIGHT: 96 LBS | HEART RATE: 57 BPM | HEIGHT: 61 IN | SYSTOLIC BLOOD PRESSURE: 110 MMHG | TEMPERATURE: 98.1 F | BODY MASS INDEX: 18.12 KG/M2

## 2018-09-17 DIAGNOSIS — Z86.39 PERSONAL HISTORY OF OTHER ENDOCRINE, NUTRITIONAL AND METABOLIC DISEASE: ICD-10-CM

## 2018-09-17 PROCEDURE — 99214 OFFICE O/P EST MOD 30 MIN: CPT | Mod: 25

## 2018-09-17 PROCEDURE — 36415 COLL VENOUS BLD VENIPUNCTURE: CPT

## 2018-09-18 ENCOUNTER — LABORATORY RESULT (OUTPATIENT)
Age: 83
End: 2018-09-18

## 2018-09-18 LAB
ALBUMIN SERPL ELPH-MCNC: 3.8 G/DL
ALP BLD-CCNC: 95 U/L
ALT SERPL-CCNC: 45 U/L
ANION GAP SERPL CALC-SCNC: 11 MMOL/L
AST SERPL-CCNC: 40 U/L
BASOPHILS NFR BLD AUTO: 0.3 %
BILIRUB SERPL-MCNC: <0.2 MG/DL
BUN SERPL-MCNC: 16 MG/DL
CALCIUM SERPL-MCNC: 9.7 MG/DL
CHLORIDE SERPL-SCNC: 107 MMOL/L
CO2 SERPL-SCNC: 27 MMOL/L
CREAT SERPL-MCNC: 0.75 MG/DL
EOSINOPHIL NFR BLD AUTO: 3.3 %
FERRITIN SERPL-MCNC: 19 NG/ML
GLUCOSE SERPL-MCNC: 106 MG/DL
HCT VFR BLD CALC: 39 %
HGB BLD-MCNC: 12.2 G/DL
IRON SATN MFR SERPL: 8 %
IRON SERPL-MCNC: 30 UG/DL
LYMPHOCYTES NFR BLD AUTO: 17.3 %
MAN DIFF?: NO
MCHC RBC-ENTMCNC: 28.2 PG
MCHC RBC-ENTMCNC: 31.3 G/DL
MCV RBC AUTO: 90.1 FL
MONOCYTES NFR BLD AUTO: 9 %
NEUTROPHILS NFR BLD AUTO: 70.1 %
PLATELET # BLD AUTO: 264 K/UL
POTASSIUM SERPL-SCNC: 5 MMOL/L
PROT SERPL-MCNC: 6.5 G/DL
RBC # BLD: 4.33 M/UL
RBC # BLD: 4.33 M/UL
RBC # FLD: 14.4 %
RETICS # AUTO: 1.6 %
SODIUM SERPL-SCNC: 145 MMOL/L
TIBC SERPL-MCNC: 395 UG/DL
TSH SERPL-ACNC: 2.21 UIU/ML
UIBC SERPL-MCNC: 365 UG/DL
WBC # FLD AUTO: 6.4 K/UL

## 2018-10-08 DIAGNOSIS — K91.2 POSTSURGICAL MALABSORPTION, NOT ELSEWHERE CLASSIFIED: ICD-10-CM

## 2018-10-08 LAB
BASOPHILS # BLD AUTO: 0.04 K/UL
BASOPHILS NFR BLD AUTO: 0.5 %
EOSINOPHIL # BLD AUTO: 0.16 K/UL
EOSINOPHIL NFR BLD AUTO: 2 %
HCT VFR BLD CALC: 37.7 %
HGB BLD-MCNC: 11.9 G/DL
IMM GRANULOCYTES NFR BLD AUTO: 0.2 %
LYMPHOCYTES # BLD AUTO: 1.56 K/UL
LYMPHOCYTES NFR BLD AUTO: 19.5 %
MAN DIFF?: NORMAL
MCHC RBC-ENTMCNC: 27.5 PG
MCHC RBC-ENTMCNC: 31.6 GM/DL
MCV RBC AUTO: 87.3 FL
MONOCYTES # BLD AUTO: 0.59 K/UL
MONOCYTES NFR BLD AUTO: 7.4 %
NEUTROPHILS # BLD AUTO: 5.64 K/UL
NEUTROPHILS NFR BLD AUTO: 70.4 %
PLATELET # BLD AUTO: 293 K/UL
RBC # BLD: 4.32 M/UL
RBC # BLD: 4.32 M/UL
RBC # FLD: 14.9 %
RETICS # AUTO: 1.6 %
RETICS AGGREG/RBC NFR: 68.3 K/UL
WBC # FLD AUTO: 8.01 K/UL

## 2018-10-10 LAB
ALBUMIN SERPL ELPH-MCNC: 4 G/DL
ALP BLD-CCNC: 101 U/L
ALT SERPL-CCNC: 52 U/L
ANION GAP SERPL CALC-SCNC: 11 MMOL/L
AST SERPL-CCNC: 48 U/L
BILIRUB SERPL-MCNC: 0.2 MG/DL
BUN SERPL-MCNC: 10 MG/DL
CALCIUM SERPL-MCNC: 9.6 MG/DL
CHLORIDE SERPL-SCNC: 105 MMOL/L
CO2 SERPL-SCNC: 25 MMOL/L
CREAT SERPL-MCNC: 0.62 MG/DL
FERRITIN SERPL-MCNC: 15 NG/ML
FOLATE SERPL-MCNC: >20 NG/ML
GLUCOSE SERPL-MCNC: 132 MG/DL
IRON SATN MFR SERPL: 12 %
IRON SERPL-MCNC: 48 UG/DL
POTASSIUM SERPL-SCNC: 4.4 MMOL/L
PROT SERPL-MCNC: 6.4 G/DL
SODIUM SERPL-SCNC: 141 MMOL/L
TIBC SERPL-MCNC: 401 UG/DL
UIBC SERPL-MCNC: 353 UG/DL
VIT B12 SERPL-MCNC: 1742 PG/ML

## 2018-10-15 ENCOUNTER — APPOINTMENT (OUTPATIENT)
Dept: HEMATOLOGY ONCOLOGY | Facility: CLINIC | Age: 83
End: 2018-10-15
Payer: MEDICARE

## 2018-10-15 VITALS
SYSTOLIC BLOOD PRESSURE: 134 MMHG | HEART RATE: 84 BPM | HEIGHT: 61 IN | TEMPERATURE: 97.7 F | WEIGHT: 95 LBS | OXYGEN SATURATION: 99 % | BODY MASS INDEX: 17.94 KG/M2 | DIASTOLIC BLOOD PRESSURE: 80 MMHG

## 2018-10-15 PROCEDURE — 36415 COLL VENOUS BLD VENIPUNCTURE: CPT

## 2018-10-15 PROCEDURE — 99213 OFFICE O/P EST LOW 20 MIN: CPT | Mod: 25

## 2018-10-17 ENCOUNTER — OUTPATIENT (OUTPATIENT)
Dept: OUTPATIENT SERVICES | Facility: HOSPITAL | Age: 83
LOS: 1 days | End: 2018-10-17
Payer: MEDICARE

## 2018-10-17 ENCOUNTER — APPOINTMENT (OUTPATIENT)
Dept: INFUSION THERAPY | Facility: HOSPITAL | Age: 83
End: 2018-10-17

## 2018-10-17 VITALS
RESPIRATION RATE: 18 BRPM | OXYGEN SATURATION: 99 % | SYSTOLIC BLOOD PRESSURE: 120 MMHG | DIASTOLIC BLOOD PRESSURE: 80 MMHG | TEMPERATURE: 98 F | HEART RATE: 77 BPM

## 2018-10-17 VITALS
SYSTOLIC BLOOD PRESSURE: 116 MMHG | RESPIRATION RATE: 18 BRPM | HEART RATE: 83 BPM | OXYGEN SATURATION: 99 % | DIASTOLIC BLOOD PRESSURE: 77 MMHG | TEMPERATURE: 98 F

## 2018-10-17 DIAGNOSIS — D50.9 IRON DEFICIENCY ANEMIA, UNSPECIFIED: ICD-10-CM

## 2018-10-17 PROCEDURE — 96365 THER/PROPH/DIAG IV INF INIT: CPT

## 2018-10-17 RX ORDER — FERUMOXYTOL 510 MG/17ML
510 INJECTION INTRAVENOUS ONCE
Qty: 0 | Refills: 0 | Status: COMPLETED | OUTPATIENT
Start: 2018-10-17 | End: 2018-10-17

## 2018-10-17 RX ADMIN — FERUMOXYTOL 117 MILLIGRAM(S): 510 INJECTION INTRAVENOUS at 08:46

## 2018-10-24 ENCOUNTER — APPOINTMENT (OUTPATIENT)
Dept: INFUSION THERAPY | Facility: HOSPITAL | Age: 83
End: 2018-10-24

## 2018-10-24 ENCOUNTER — OUTPATIENT (OUTPATIENT)
Dept: OUTPATIENT SERVICES | Facility: HOSPITAL | Age: 83
LOS: 1 days | End: 2018-10-24
Payer: MEDICARE

## 2018-10-24 VITALS
HEIGHT: 62 IN | OXYGEN SATURATION: 99 % | DIASTOLIC BLOOD PRESSURE: 55 MMHG | WEIGHT: 93.92 LBS | HEART RATE: 76 BPM | RESPIRATION RATE: 18 BRPM | SYSTOLIC BLOOD PRESSURE: 105 MMHG | TEMPERATURE: 98 F

## 2018-10-24 DIAGNOSIS — D50.9 IRON DEFICIENCY ANEMIA, UNSPECIFIED: ICD-10-CM

## 2018-10-24 PROCEDURE — 96365 THER/PROPH/DIAG IV INF INIT: CPT

## 2018-10-24 RX ORDER — FERUMOXYTOL 510 MG/17ML
510 INJECTION INTRAVENOUS ONCE
Qty: 0 | Refills: 0 | Status: COMPLETED | OUTPATIENT
Start: 2018-10-24 | End: 2018-10-24

## 2018-10-24 RX ADMIN — FERUMOXYTOL 468 MILLIGRAM(S): 510 INJECTION INTRAVENOUS at 08:51

## 2018-10-25 DIAGNOSIS — K90.9 INTESTINAL MALABSORPTION, UNSPECIFIED: ICD-10-CM

## 2018-10-26 DIAGNOSIS — K90.9 INTESTINAL MALABSORPTION, UNSPECIFIED: ICD-10-CM

## 2018-11-19 ENCOUNTER — APPOINTMENT (OUTPATIENT)
Dept: HEMATOLOGY ONCOLOGY | Facility: CLINIC | Age: 83
End: 2018-11-19
Payer: MEDICARE

## 2018-11-19 VITALS
WEIGHT: 95 LBS | HEART RATE: 66 BPM | DIASTOLIC BLOOD PRESSURE: 60 MMHG | OXYGEN SATURATION: 99 % | BODY MASS INDEX: 17.95 KG/M2 | TEMPERATURE: 98 F | SYSTOLIC BLOOD PRESSURE: 115 MMHG

## 2018-11-19 PROCEDURE — 99214 OFFICE O/P EST MOD 30 MIN: CPT | Mod: 25

## 2018-11-19 PROCEDURE — 36415 COLL VENOUS BLD VENIPUNCTURE: CPT

## 2018-11-19 NOTE — REASON FOR VISIT
[Follow-Up Visit] : a follow-up visit for [FreeTextEntry2] : iron deficiency anemia, chronic GI blood loss

## 2018-11-19 NOTE — ASSESSMENT
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 She tolerated recent Feraheme infusions. Have ordered Complete Blood Count with differential, Comprehensive Metabolic Panel, Ferritin, Iron Panel, Reticulocyte Count, Thyroid Stimulating Hormone and Vitamin D. Patient was advised to call office to discuss results and next appointment date.

## 2018-11-19 NOTE — HISTORY OF PRESENT ILLNESS
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 She received her two most recent iron infusions each a week apart, two and three weeks ago which were well tolerated. She states that she feels constantly fatigued from actively taking care of her daughter who is suffering from medical issues.She saw her PCP, Dr. Lee, who requested some blood tests be added today. She otherwise feels generally well and states that diarrhea has improved since altering her diet. Denies fever, chills or sweats.

## 2018-11-19 NOTE — PHYSICAL EXAM
[Thin] : thin [Normal] : normal appearance, no rash, nodules, vesicles, ulcers, erythema [de-identified] : multiple scars on abdomen

## 2018-11-19 NOTE — REVIEW OF SYSTEMS
[Fatigue] : fatigue [Insomnia] : insomnia [Anxiety] : anxiety [Negative] : Allergic/Immunologic [FreeTextEntry3] : has floaters

## 2018-11-20 ENCOUNTER — LABORATORY RESULT (OUTPATIENT)
Age: 83
End: 2018-11-20

## 2018-11-20 LAB
25(OH)D3 SERPL-MCNC: 32.4 NG/ML
ALBUMIN SERPL ELPH-MCNC: 4 G/DL
ALP BLD-CCNC: 93 U/L
ALT SERPL-CCNC: 74 U/L
ANION GAP SERPL CALC-SCNC: 14 MMOL/L
AST SERPL-CCNC: 45 U/L
BASOPHILS NFR BLD AUTO: 0.5 %
BILIRUB SERPL-MCNC: 0.2 MG/DL
BUN SERPL-MCNC: 19 MG/DL
CALCIUM SERPL-MCNC: 9.5 MG/DL
CHLORIDE SERPL-SCNC: 107 MMOL/L
CO2 SERPL-SCNC: 24 MMOL/L
CREAT SERPL-MCNC: 0.62 MG/DL
EOSINOPHIL NFR BLD AUTO: 1.2 %
FERRITIN SERPL-MCNC: 595 NG/ML
GLUCOSE SERPL-MCNC: 131 MG/DL
HCT VFR BLD CALC: 40.6 %
HGB BLD-MCNC: 13.2 G/DL
IRON SATN MFR SERPL: 41 %
IRON SERPL-MCNC: 119 UG/DL
LYMPHOCYTES NFR BLD AUTO: 17.6 %
MAN DIFF?: NO
MCHC RBC-ENTMCNC: 27.7 PG
MCHC RBC-ENTMCNC: 32.5 G/DL
MCV RBC AUTO: 85.3 FL
MONOCYTES NFR BLD AUTO: 6.5 %
NEUTROPHILS NFR BLD AUTO: 74.2 %
PLATELET # BLD AUTO: 212 K/UL
POTASSIUM SERPL-SCNC: 4.7 MMOL/L
PROT SERPL-MCNC: 6.7 G/DL
RBC # BLD: 4.76 M/UL
RBC # BLD: 4.76 M/UL
RBC # FLD: 17.8 %
RETICS # AUTO: 1 %
SODIUM SERPL-SCNC: 145 MMOL/L
TIBC SERPL-MCNC: 291 UG/DL
TSH SERPL-ACNC: 2.5 UIU/ML
UIBC SERPL-MCNC: 172 UG/DL
WBC # FLD AUTO: 5.9 K/UL

## 2019-01-02 ENCOUNTER — APPOINTMENT (OUTPATIENT)
Dept: HEMATOLOGY ONCOLOGY | Facility: CLINIC | Age: 84
End: 2019-01-02
Payer: MEDICARE

## 2019-01-02 VITALS
TEMPERATURE: 97.9 F | HEIGHT: 61 IN | DIASTOLIC BLOOD PRESSURE: 80 MMHG | SYSTOLIC BLOOD PRESSURE: 122 MMHG | HEART RATE: 68 BPM | OXYGEN SATURATION: 97 % | WEIGHT: 94 LBS | BODY MASS INDEX: 17.75 KG/M2

## 2019-01-02 LAB
BASOPHILS NFR BLD AUTO: 0.7 %
EOSINOPHIL NFR BLD AUTO: 2.4 %
HCT VFR BLD CALC: 41.4 %
HGB BLD-MCNC: 13.6 G/DL
LYMPHOCYTES NFR BLD AUTO: 19.7 %
MAN DIFF?: NO
MCHC RBC-ENTMCNC: 28.6 PG
MCHC RBC-ENTMCNC: 32.9 G/DL
MCV RBC AUTO: 87.2 FL
MONOCYTES NFR BLD AUTO: 9.2 %
NEUTROPHILS NFR BLD AUTO: 68 %
PLATELET # BLD AUTO: 274 K/UL
RBC # BLD: 4.75 M/UL
RBC # BLD: 4.75 M/UL
RBC # FLD: 18.4 %
RETICS # AUTO: 1.3 %
WBC # FLD AUTO: 7 K/UL

## 2019-01-02 PROCEDURE — 36415 COLL VENOUS BLD VENIPUNCTURE: CPT

## 2019-01-02 PROCEDURE — 99213 OFFICE O/P EST LOW 20 MIN: CPT | Mod: 25

## 2019-01-02 RX ORDER — CETIRIZINE HCL 10 MG
TABLET ORAL
Refills: 0 | Status: ACTIVE | COMMUNITY

## 2019-01-02 NOTE — ASSESSMENT
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 Have ordered Complete Blood Count with differential, Comprehensive Metabolic Panel, Ferritin, Iron Panel, Reticulocyte Count. Iron infusion pending results. Patient was advised to call office to discuss results and next appointment date.

## 2019-01-02 NOTE — PHYSICAL EXAM
[Thin] : thin [Normal] : affect appropriate [de-identified] : multiple scars on abdomen,maculopapular fading erythematous rash on trunk and extremities

## 2019-01-02 NOTE — HISTORY OF PRESENT ILLNESS
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 Patient states that she had an allergic reaction (skin rash) to amoxicillin which she was recently prescribed to treat strep throat. She is also stressed and depressed by her daughter's medical issues. Patient states that her diarrhea has improved and that she feels generally well otherwise. Denies fever, chills or sweats, blood in stool or black stool. Her last iron infusion was within the past few months..

## 2019-01-02 NOTE — REASON FOR VISIT
[Follow-Up Visit] : a follow-up visit for [FreeTextEntry2] : iron deficiency anemia, chronic GI blood loss, fatigue

## 2019-01-02 NOTE — REVIEW OF SYSTEMS
[Fatigue] : fatigue [Skin Rash] : skin rash [Negative] : Allergic/Immunologic [de-identified] : drug induced, amoxacillin, pruritic

## 2019-01-03 ENCOUNTER — LABORATORY RESULT (OUTPATIENT)
Age: 84
End: 2019-01-03

## 2019-01-03 LAB
ALBUMIN SERPL ELPH-MCNC: 4.4 G/DL
ALP BLD-CCNC: 98 U/L
ALT SERPL-CCNC: 47 U/L
ANION GAP SERPL CALC-SCNC: 11 MMOL/L
AST SERPL-CCNC: 41 U/L
BILIRUB SERPL-MCNC: 0.2 MG/DL
BUN SERPL-MCNC: 15 MG/DL
CALCIUM SERPL-MCNC: 10 MG/DL
CHLORIDE SERPL-SCNC: 105 MMOL/L
CO2 SERPL-SCNC: 28 MMOL/L
CREAT SERPL-MCNC: 0.55 MG/DL
FERRITIN SERPL-MCNC: 445 NG/ML
GLUCOSE SERPL-MCNC: 77 MG/DL
IRON SATN MFR SERPL: 39 %
IRON SERPL-MCNC: 110 UG/DL
POTASSIUM SERPL-SCNC: 5.2 MMOL/L
PROT SERPL-MCNC: 7.2 G/DL
SODIUM SERPL-SCNC: 144 MMOL/L
TIBC SERPL-MCNC: 285 UG/DL
UIBC SERPL-MCNC: 175 UG/DL

## 2019-02-14 ENCOUNTER — APPOINTMENT (OUTPATIENT)
Dept: HEMATOLOGY ONCOLOGY | Facility: CLINIC | Age: 84
End: 2019-02-14
Payer: MEDICARE

## 2019-02-14 VITALS
TEMPERATURE: 97.9 F | WEIGHT: 94 LBS | DIASTOLIC BLOOD PRESSURE: 80 MMHG | SYSTOLIC BLOOD PRESSURE: 124 MMHG | HEIGHT: 61 IN | HEART RATE: 62 BPM | BODY MASS INDEX: 17.75 KG/M2 | OXYGEN SATURATION: 98 %

## 2019-02-14 PROCEDURE — 36415 COLL VENOUS BLD VENIPUNCTURE: CPT

## 2019-02-14 PROCEDURE — 99213 OFFICE O/P EST LOW 20 MIN: CPT | Mod: 25

## 2019-02-14 NOTE — ASSESSMENT
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 Have ordered CBC, Reticulocyte count, CMP, Ferritin and iron panel. Patient was advised to call office to discuss results and next appointment date.

## 2019-02-14 NOTE — END OF VISIT
[FreeTextEntry3] : All medical record entries made by the benitoibjohnnie were at my, Dr. Jones direction and personally dictated by me on February 14, 2019. I have reviewed the chart and agree that the record accurately reflects my personal performance of the history, physical exam, assessment and plan. I have also personally directed, reviewed, and agreed with the chart.

## 2019-02-14 NOTE — HISTORY OF PRESENT ILLNESS
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 She states that she had four rashes on her face and body. She complains of feeling fatigued and states that she is chronically stressed by her daughter's health issues. States she is just not feeling her usual self. Denies gastric issues, fever, chills or sweats.

## 2019-02-14 NOTE — REVIEW OF SYSTEMS
[Fatigue] : fatigue [Skin Rash] : skin rash [Negative] : Allergic/Immunologic [FreeTextEntry2] : doesn’t feel "right" [de-identified] : several different skin rashes

## 2019-02-14 NOTE — PHYSICAL EXAM
[Thin] : thin [Normal] : affect appropriate [de-identified] : multiple scars on abdomen,maculopapular fading erythematous rash on trunk and extremities, multiple senile keratoses

## 2019-02-18 LAB
ALBUMIN SERPL ELPH-MCNC: 4.5 G/DL
ALP BLD-CCNC: 104 U/L
ALT SERPL-CCNC: 97 U/L
ANION GAP SERPL CALC-SCNC: 12 MMOL/L
AST SERPL-CCNC: 63 U/L
BASOPHILS # BLD AUTO: 0.05 K/UL
BASOPHILS NFR BLD AUTO: 0.6 %
BILIRUB SERPL-MCNC: 0.2 MG/DL
BUN SERPL-MCNC: 15 MG/DL
CALCIUM SERPL-MCNC: 10.4 MG/DL
CHLORIDE SERPL-SCNC: 103 MMOL/L
CO2 SERPL-SCNC: 27 MMOL/L
CREAT SERPL-MCNC: 0.54 MG/DL
EOSINOPHIL # BLD AUTO: 0.11 K/UL
EOSINOPHIL NFR BLD AUTO: 1.4 %
FERRITIN SERPL-MCNC: 355 NG/ML
GLUCOSE SERPL-MCNC: 82 MG/DL
HCT VFR BLD CALC: 46.7 %
HGB BLD-MCNC: 14.8 G/DL
IMM GRANULOCYTES NFR BLD AUTO: 0.8 %
IRON SATN MFR SERPL: 35 %
IRON SERPL-MCNC: 116 UG/DL
LYMPHOCYTES # BLD AUTO: 1.44 K/UL
LYMPHOCYTES NFR BLD AUTO: 18.5 %
MAN DIFF?: NORMAL
MCHC RBC-ENTMCNC: 29.7 PG
MCHC RBC-ENTMCNC: 31.7 GM/DL
MCV RBC AUTO: 93.8 FL
MONOCYTES # BLD AUTO: 0.53 K/UL
MONOCYTES NFR BLD AUTO: 6.8 %
NEUTROPHILS # BLD AUTO: 5.58 K/UL
NEUTROPHILS NFR BLD AUTO: 71.9 %
PLATELET # BLD AUTO: 275 K/UL
POTASSIUM SERPL-SCNC: 4.7 MMOL/L
PROT SERPL-MCNC: 7.4 G/DL
RBC # BLD: 4.98 M/UL
RBC # BLD: 4.98 M/UL
RBC # FLD: 14.8 %
RETICS # AUTO: 1.2 %
RETICS AGGREG/RBC NFR: 60.3 K/UL
SODIUM SERPL-SCNC: 142 MMOL/L
TIBC SERPL-MCNC: 334 UG/DL
UIBC SERPL-MCNC: 218 UG/DL
WBC # FLD AUTO: 7.77 K/UL

## 2019-03-25 ENCOUNTER — APPOINTMENT (OUTPATIENT)
Dept: HEMATOLOGY ONCOLOGY | Facility: CLINIC | Age: 84
End: 2019-03-25
Payer: MEDICARE

## 2019-03-25 VITALS
HEART RATE: 68 BPM | DIASTOLIC BLOOD PRESSURE: 68 MMHG | TEMPERATURE: 97.8 F | HEIGHT: 61 IN | OXYGEN SATURATION: 96 % | WEIGHT: 95 LBS | SYSTOLIC BLOOD PRESSURE: 126 MMHG | BODY MASS INDEX: 17.94 KG/M2

## 2019-03-25 LAB
BASOPHILS # BLD AUTO: 0.06 K/UL
BASOPHILS NFR BLD AUTO: 0.7 %
EOSINOPHIL # BLD AUTO: 0.1 K/UL
EOSINOPHIL NFR BLD AUTO: 1.2 %
HCT VFR BLD CALC: 40.5 %
HGB BLD-MCNC: 12.8 G/DL
IMM GRANULOCYTES NFR BLD AUTO: 1.1 %
LYMPHOCYTES # BLD AUTO: 1.48 K/UL
LYMPHOCYTES NFR BLD AUTO: 17.8 %
MAN DIFF?: NORMAL
MCHC RBC-ENTMCNC: 30 PG
MCHC RBC-ENTMCNC: 31.6 GM/DL
MCV RBC AUTO: 95.1 FL
MONOCYTES # BLD AUTO: 0.66 K/UL
MONOCYTES NFR BLD AUTO: 8 %
NEUTROPHILS # BLD AUTO: 5.91 K/UL
NEUTROPHILS NFR BLD AUTO: 71.2 %
PLATELET # BLD AUTO: 259 K/UL
RBC # BLD: 4.26 M/UL
RBC # BLD: 4.26 M/UL
RBC # FLD: 14.6 %
RETICS # AUTO: 2.2 %
RETICS AGGREG/RBC NFR: 92.4 K/UL
WBC # FLD AUTO: 8.3 K/UL

## 2019-03-25 PROCEDURE — 99213 OFFICE O/P EST LOW 20 MIN: CPT | Mod: 25

## 2019-03-25 PROCEDURE — 36415 COLL VENOUS BLD VENIPUNCTURE: CPT

## 2019-03-25 NOTE — REVIEW OF SYSTEMS
[Fatigue] : fatigue [Fever] : no fever [Chills] : no chills [Night Sweats] : no night sweats [Recent Change In Weight] : ~T no recent weight change [Abdominal Pain] : no abdominal pain [Vomiting] : no vomiting [Constipation] : no constipation [Diarrhea] : no diarrhea [Negative] : Genitourinary [FreeTextEntry7] : several days of dark stool [FreeTextEntry8] : several days of dark stool [de-identified] : stressed over daughter's illness

## 2019-03-25 NOTE — HISTORY OF PRESENT ILLNESS
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 She has not felt well. She complains of dark stool this past week. The patient is also stressed and fatigued by her daughter's health issues. Her last iron infusion was in October 2018. Denies abdominal pain, fever, chills or sweats.

## 2019-03-25 NOTE — ASSESSMENT
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 Have ordered CBC, reticulocyte count, CMP, Iron panel, and ferritin. Possible iron infusion pending results. Patient was advised to call office to discuss results and next appointment date.

## 2019-03-25 NOTE — PHYSICAL EXAM
[Thin] : thin [Normal] : affect appropriate [de-identified] : multiple scars on abdomen, slight rash on face, multiple senile keratoses

## 2019-03-25 NOTE — REASON FOR VISIT
[Follow-Up Visit] : a follow-up visit for [FreeTextEntry2] : iron deficiency anemia, anemia of chronic blood loss, fatigue

## 2019-03-25 NOTE — END OF VISIT
[FreeTextEntry3] : All medical record entries made by the benitoibjohnnie were at my, Dr. Jones direction and personally dictated by me on Mar 25, 2019 . I have reviewed the chart and agree that the record accurately reflects my personal performance of the history, physical exam, assessment and plan. I have also personally directed, reviewed, and agreed with the chart.

## 2019-03-26 LAB
ALBUMIN SERPL ELPH-MCNC: 4.1 G/DL
ALP BLD-CCNC: 90 U/L
ALT SERPL-CCNC: 59 U/L
ANION GAP SERPL CALC-SCNC: 13 MMOL/L
AST SERPL-CCNC: 48 U/L
BILIRUB SERPL-MCNC: 0.2 MG/DL
BUN SERPL-MCNC: 18 MG/DL
CALCIUM SERPL-MCNC: 9.5 MG/DL
CHLORIDE SERPL-SCNC: 101 MMOL/L
CO2 SERPL-SCNC: 27 MMOL/L
CREAT SERPL-MCNC: 0.56 MG/DL
FERRITIN SERPL-MCNC: 189 NG/ML
GLUCOSE SERPL-MCNC: 72 MG/DL
IRON SATN MFR SERPL: 27 %
IRON SERPL-MCNC: 87 UG/DL
POTASSIUM SERPL-SCNC: 4.3 MMOL/L
PROT SERPL-MCNC: 6.5 G/DL
SODIUM SERPL-SCNC: 141 MMOL/L
TIBC SERPL-MCNC: 327 UG/DL
UIBC SERPL-MCNC: 240 UG/DL

## 2019-04-22 ENCOUNTER — APPOINTMENT (OUTPATIENT)
Dept: HEMATOLOGY ONCOLOGY | Facility: CLINIC | Age: 84
End: 2019-04-22
Payer: MEDICARE

## 2019-04-22 VITALS
SYSTOLIC BLOOD PRESSURE: 126 MMHG | WEIGHT: 93 LBS | BODY MASS INDEX: 17.56 KG/M2 | HEIGHT: 61 IN | HEART RATE: 75 BPM | OXYGEN SATURATION: 98 % | DIASTOLIC BLOOD PRESSURE: 72 MMHG | TEMPERATURE: 98.3 F

## 2019-04-22 PROCEDURE — 36415 COLL VENOUS BLD VENIPUNCTURE: CPT

## 2019-04-22 PROCEDURE — 99213 OFFICE O/P EST LOW 20 MIN: CPT | Mod: 25

## 2019-04-22 NOTE — HISTORY OF PRESENT ILLNESS
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 She has  felt generally well since last visit. She does not complain of dark or loose stools. The patient is  stressed and fatigued by her daughter's health issues. Her last iron infusion was in October 2018. Denies abdominal pain, fever, chills or sweats. She is planning a trip to [facility] in May.\par \par

## 2019-04-22 NOTE — REASON FOR VISIT
[Follow-Up Visit] : a follow-up visit for [FreeTextEntry2] : iron deficiency anemia, anemia of chronic gastrointestinal blood loss, fatigue

## 2019-04-22 NOTE — PHYSICAL EXAM
[Thin] : thin [Normal] : affect appropriate [de-identified] : multiple scars on abdomen, slight rash on face, multiple senile keratoses

## 2019-04-22 NOTE — ASSESSMENT
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 She is fatigued but otherwise well. Have ordered CBC, reticulocyte count, CMP, iron panel and ferritin. Further recommendations pending results. If stable, RV in one month.

## 2019-04-23 ENCOUNTER — LABORATORY RESULT (OUTPATIENT)
Age: 84
End: 2019-04-23

## 2019-04-23 LAB
ALBUMIN SERPL ELPH-MCNC: 4.3 G/DL
ALP BLD-CCNC: 103 U/L
ALT SERPL-CCNC: 47 U/L
ANION GAP SERPL CALC-SCNC: 12 MMOL/L
AST SERPL-CCNC: 34 U/L
BASOPHILS # BLD AUTO: 0.05 K/UL
BASOPHILS NFR BLD AUTO: 0.6 %
BILIRUB SERPL-MCNC: 0.2 MG/DL
BUN SERPL-MCNC: 13 MG/DL
CALCIUM SERPL-MCNC: 9.9 MG/DL
CHLORIDE SERPL-SCNC: 100 MMOL/L
CO2 SERPL-SCNC: 28 MMOL/L
CREAT SERPL-MCNC: 0.61 MG/DL
EOSINOPHIL # BLD AUTO: 0.1 K/UL
EOSINOPHIL NFR BLD AUTO: 1.3 %
FERRITIN SERPL-MCNC: 92 NG/ML
FOLATE SERPL-MCNC: >20 NG/ML
GLUCOSE SERPL-MCNC: 77 MG/DL
HCT VFR BLD CALC: 44.6 %
HGB BLD-MCNC: 14.4 G/DL
IMM GRANULOCYTES NFR BLD AUTO: 0.4 %
IRON SATN MFR SERPL: 15 %
IRON SERPL-MCNC: 51 UG/DL
LYMPHOCYTES # BLD AUTO: 1.34 K/UL
LYMPHOCYTES NFR BLD AUTO: 17 %
MAN DIFF?: NORMAL
MCHC RBC-ENTMCNC: 30.4 PG
MCHC RBC-ENTMCNC: 32.3 GM/DL
MCV RBC AUTO: 94.1 FL
MONOCYTES # BLD AUTO: 0.51 K/UL
MONOCYTES NFR BLD AUTO: 6.5 %
NEUTROPHILS # BLD AUTO: 5.87 K/UL
NEUTROPHILS NFR BLD AUTO: 74.2 %
PLATELET # BLD AUTO: 251 K/UL
POTASSIUM SERPL-SCNC: 5 MMOL/L
PROT SERPL-MCNC: 6.8 G/DL
RBC # BLD: 4.74 M/UL
RBC # BLD: 4.74 M/UL
RBC # FLD: 13.4 %
RETICS # AUTO: 1.3 %
RETICS AGGREG/RBC NFR: 59.3 K/UL
SODIUM SERPL-SCNC: 140 MMOL/L
TIBC SERPL-MCNC: 343 UG/DL
UIBC SERPL-MCNC: 292 UG/DL
VIT B12 SERPL-MCNC: 1606 PG/ML
WBC # FLD AUTO: 7.9 K/UL

## 2019-05-20 ENCOUNTER — APPOINTMENT (OUTPATIENT)
Dept: HEMATOLOGY ONCOLOGY | Facility: CLINIC | Age: 84
End: 2019-05-20
Payer: MEDICARE

## 2019-05-20 VITALS
DIASTOLIC BLOOD PRESSURE: 80 MMHG | HEART RATE: 72 BPM | SYSTOLIC BLOOD PRESSURE: 124 MMHG | BODY MASS INDEX: 17.75 KG/M2 | OXYGEN SATURATION: 98 % | WEIGHT: 94 LBS | TEMPERATURE: 97.9 F | HEIGHT: 61 IN

## 2019-05-20 LAB
BASOPHILS # BLD AUTO: 0.05 K/UL
BASOPHILS NFR BLD AUTO: 0.7 %
EOSINOPHIL # BLD AUTO: 0.1 K/UL
EOSINOPHIL NFR BLD AUTO: 1.3 %
HCT VFR BLD CALC: 43 %
HGB BLD-MCNC: 13.8 G/DL
IMM GRANULOCYTES NFR BLD AUTO: 0.8 %
LYMPHOCYTES # BLD AUTO: 1.27 K/UL
LYMPHOCYTES NFR BLD AUTO: 16.7 %
MAN DIFF?: NORMAL
MCHC RBC-ENTMCNC: 29.4 PG
MCHC RBC-ENTMCNC: 32.1 GM/DL
MCV RBC AUTO: 91.7 FL
MONOCYTES # BLD AUTO: 0.55 K/UL
MONOCYTES NFR BLD AUTO: 7.2 %
NEUTROPHILS # BLD AUTO: 5.59 K/UL
NEUTROPHILS NFR BLD AUTO: 73.3 %
PLATELET # BLD AUTO: 227 K/UL
RBC # BLD: 4.69 M/UL
RBC # BLD: 4.69 M/UL
RBC # FLD: 13.4 %
RETICS # AUTO: 1 %
RETICS AGGREG/RBC NFR: 46.9 K/UL
WBC # FLD AUTO: 7.62 K/UL

## 2019-05-20 PROCEDURE — 99213 OFFICE O/P EST LOW 20 MIN: CPT | Mod: 25

## 2019-05-20 PROCEDURE — 36415 COLL VENOUS BLD VENIPUNCTURE: CPT

## 2019-05-20 NOTE — HISTORY OF PRESENT ILLNESS
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 Patient's most recent blood results revealed hemoglobin 14.4 and hematocrit 44.6. The remainder of the CBC was normal and the chemistries were fine with a minimal elevation in SGPT of 47. Her B12 was 1606 and the ferritin was 92. Patient has felt generally well since last visit, but is stressed by her daughter's health issues. Denies dark or loose stools, abdominal pain, fever, chills, or sweats. Of note, her last iron infusion was in October 2018. Patient is leaving for her trip to [facility] this Sunday.

## 2019-05-20 NOTE — END OF VISIT
[FreeTextEntry3] : All medical record entries made by the Scribe were at my, Dr. Argentina Jones, direction and personally dictated by me on 05/20/2019. I have reviewed the chart and agree that the record accurately reflects my personal performance of the history, physical exam, assessment and plan. I have also personally directed, reviewed, and agreed with the chart.

## 2019-05-20 NOTE — ASSESSMENT
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 Patient is feeling generally well and denies any acute complaints. If blood count and iron studies remain stable, the patient will not need an iron infusion at this point. Have ordered CBC, reticulocyte count, CMP, iron panel, ferritin, and TSH. Patient was advised to call office to discuss results and next appointment date. A copy of results will be sent to patient's PCP, Dr. Lee.

## 2019-05-20 NOTE — ADDENDUM
[FreeTextEntry1] : I, Dariusz Bonilla, acted solely as a scribe for Dr. Argentina Jones on 05/20/2019.

## 2019-05-20 NOTE — PHYSICAL EXAM
[Thin] : thin [Normal] : affect appropriate [de-identified] : multiple scars on abdomen, slight rash on face, multiple senile keratoses, scar right knee

## 2019-05-21 LAB
ALBUMIN SERPL ELPH-MCNC: 4.2 G/DL
ALP BLD-CCNC: 95 U/L
ALT SERPL-CCNC: 60 U/L
ANION GAP SERPL CALC-SCNC: 12 MMOL/L
AST SERPL-CCNC: 46 U/L
BILIRUB SERPL-MCNC: 0.2 MG/DL
BUN SERPL-MCNC: 17 MG/DL
CALCIUM SERPL-MCNC: 9.7 MG/DL
CHLORIDE SERPL-SCNC: 105 MMOL/L
CO2 SERPL-SCNC: 25 MMOL/L
CREAT SERPL-MCNC: 0.57 MG/DL
FERRITIN SERPL-MCNC: 78 NG/ML
GLUCOSE SERPL-MCNC: 75 MG/DL
IRON SATN MFR SERPL: 18 %
IRON SERPL-MCNC: 61 UG/DL
POTASSIUM SERPL-SCNC: 4.5 MMOL/L
PROT SERPL-MCNC: 6.8 G/DL
SODIUM SERPL-SCNC: 142 MMOL/L
TIBC SERPL-MCNC: 332 UG/DL
TSH SERPL-ACNC: 2.35 UIU/ML
UIBC SERPL-MCNC: 271 UG/DL

## 2019-06-12 ENCOUNTER — APPOINTMENT (OUTPATIENT)
Dept: HEMATOLOGY ONCOLOGY | Facility: CLINIC | Age: 84
End: 2019-06-12
Payer: MEDICARE

## 2019-06-12 VITALS
SYSTOLIC BLOOD PRESSURE: 118 MMHG | TEMPERATURE: 97.6 F | HEART RATE: 74 BPM | HEIGHT: 61 IN | WEIGHT: 94 LBS | DIASTOLIC BLOOD PRESSURE: 80 MMHG | OXYGEN SATURATION: 97 % | BODY MASS INDEX: 17.75 KG/M2

## 2019-06-12 LAB
BASOPHILS # BLD AUTO: 0.04 K/UL
BASOPHILS NFR BLD AUTO: 0.5 %
EOSINOPHIL # BLD AUTO: 0.13 K/UL
EOSINOPHIL NFR BLD AUTO: 1.7 %
HCT VFR BLD CALC: 43.8 %
HGB BLD-MCNC: 14 G/DL
IMM GRANULOCYTES NFR BLD AUTO: 0.5 %
LYMPHOCYTES # BLD AUTO: 1.63 K/UL
LYMPHOCYTES NFR BLD AUTO: 21.2 %
MAN DIFF?: NORMAL
MCHC RBC-ENTMCNC: 29.2 PG
MCHC RBC-ENTMCNC: 32 GM/DL
MCV RBC AUTO: 91.3 FL
MONOCYTES # BLD AUTO: 0.61 K/UL
MONOCYTES NFR BLD AUTO: 7.9 %
NEUTROPHILS # BLD AUTO: 5.24 K/UL
NEUTROPHILS NFR BLD AUTO: 68.2 %
PLATELET # BLD AUTO: 252 K/UL
RBC # BLD: 4.8 M/UL
RBC # BLD: 4.8 M/UL
RBC # FLD: 14 %
RETICS # AUTO: 1.1 %
RETICS AGGREG/RBC NFR: 53.3 K/UL
WBC # FLD AUTO: 7.69 K/UL

## 2019-06-12 PROCEDURE — 36415 COLL VENOUS BLD VENIPUNCTURE: CPT

## 2019-06-12 PROCEDURE — 99212 OFFICE O/P EST SF 10 MIN: CPT | Mod: 25

## 2019-06-12 NOTE — ADDENDUM
[FreeTextEntry1] : I, Dariusz Bonilla, acted solely as a scribe for Dr. Argentina Jones on 06/12/2019.

## 2019-06-12 NOTE — ASSESSMENT
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 Patient is feeling generally well and denies any acute complaints. If blood count and iron studies remain stable, the patient will not need an iron infusion at the present time. Have ordered CBC, reticulocyte count, CMP, iron panel, and ferritin. Patient was advised to call office to discuss results and next appointment date. A copy of the results will be sent to patient's PCP, Dr. Lee.

## 2019-06-12 NOTE — END OF VISIT
[FreeTextEntry3] : All medical record entries made by the Scribe were at my, Dr. Argentina Jones, direction and personally dictated by me on 06/12/2019. I have reviewed the chart and agree that the record accurately reflects my personal performance of the history, physical exam, assessment and plan. I have also personally directed, reviewed, and agreed with the chart.

## 2019-06-12 NOTE — PHYSICAL EXAM
[Thin] : thin [Normal] : grossly intact [de-identified] : multiple scars on abdomen, slight rash on face, multiple senile keratoses, scar right knee

## 2019-06-12 NOTE — HISTORY OF PRESENT ILLNESS
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 Patient's most recent blood results revealed hemoglobin 13.8 and hematocrit 43. The iron panel was normal, and ferritin was 78. TSH was normal as well, at 2.35. Chemistries revealed an elevation in SGPT of 47 and SGOT of 46, but were otherwise within normal limits. Patient reports that on her trip to [facility], she experienced exertional shortness of breath and exacerbated knee pain, which she attributed to her increased activity. Patient has since felt generally well, but is stressed by her daughter's health issues. Denies melena, hematochezia,  gingival bleeding, fever, chills, or sweats. Of note, her last iron infusion was in October 2018. Patient is travelling to [place] for a wedding in July.

## 2019-06-13 LAB
ALBUMIN SERPL ELPH-MCNC: 4.3 G/DL
ALP BLD-CCNC: 101 U/L
ALT SERPL-CCNC: 53 U/L
ANION GAP SERPL CALC-SCNC: 12 MMOL/L
AST SERPL-CCNC: 43 U/L
BILIRUB SERPL-MCNC: 0.2 MG/DL
BUN SERPL-MCNC: 19 MG/DL
CALCIUM SERPL-MCNC: 9.7 MG/DL
CHLORIDE SERPL-SCNC: 103 MMOL/L
CO2 SERPL-SCNC: 27 MMOL/L
CREAT SERPL-MCNC: 0.56 MG/DL
FERRITIN SERPL-MCNC: 66 NG/ML
GLUCOSE SERPL-MCNC: 73 MG/DL
IRON SATN MFR SERPL: 19 %
IRON SERPL-MCNC: 71 UG/DL
POTASSIUM SERPL-SCNC: 4.6 MMOL/L
PROT SERPL-MCNC: 6.8 G/DL
SODIUM SERPL-SCNC: 142 MMOL/L
TIBC SERPL-MCNC: 366 UG/DL
UIBC SERPL-MCNC: 295 UG/DL

## 2019-07-25 ENCOUNTER — APPOINTMENT (OUTPATIENT)
Dept: HEMATOLOGY ONCOLOGY | Facility: CLINIC | Age: 84
End: 2019-07-25
Payer: MEDICARE

## 2019-07-25 VITALS
HEIGHT: 61 IN | DIASTOLIC BLOOD PRESSURE: 80 MMHG | SYSTOLIC BLOOD PRESSURE: 128 MMHG | TEMPERATURE: 98 F | WEIGHT: 94 LBS | HEART RATE: 82 BPM | OXYGEN SATURATION: 98 % | BODY MASS INDEX: 17.75 KG/M2

## 2019-07-25 LAB
BASOPHILS # BLD AUTO: 0.05 K/UL
BASOPHILS NFR BLD AUTO: 0.7 %
EOSINOPHIL # BLD AUTO: 0.12 K/UL
EOSINOPHIL NFR BLD AUTO: 1.7 %
HCT VFR BLD CALC: 45.1 %
HGB BLD-MCNC: 14.4 G/DL
IMM GRANULOCYTES NFR BLD AUTO: 0.7 %
LYMPHOCYTES # BLD AUTO: 1.31 K/UL
LYMPHOCYTES NFR BLD AUTO: 18.2 %
MAN DIFF?: NORMAL
MCHC RBC-ENTMCNC: 28.7 PG
MCHC RBC-ENTMCNC: 31.9 GM/DL
MCV RBC AUTO: 90 FL
MONOCYTES # BLD AUTO: 0.62 K/UL
MONOCYTES NFR BLD AUTO: 8.6 %
NEUTROPHILS # BLD AUTO: 5.03 K/UL
NEUTROPHILS NFR BLD AUTO: 70.1 %
PLATELET # BLD AUTO: 264 K/UL
RBC # BLD: 5.01 M/UL
RBC # BLD: 5.01 M/UL
RBC # FLD: 15.1 %
RETICS # AUTO: 1.4 %
RETICS AGGREG/RBC NFR: 68.6 K/UL
WBC # FLD AUTO: 7.18 K/UL

## 2019-07-25 PROCEDURE — 99213 OFFICE O/P EST LOW 20 MIN: CPT | Mod: 25

## 2019-07-25 PROCEDURE — 36415 COLL VENOUS BLD VENIPUNCTURE: CPT

## 2019-07-25 NOTE — REASON FOR VISIT
[Follow-Up Visit] : a follow-up visit for [FreeTextEntry2] : iron deficiency anemia due to chronic blood loss, fatigue

## 2019-07-25 NOTE — END OF VISIT
[FreeTextEntry3] : All medical record entries made by the Scribe were at my, Dr. Argentina Jones, direction and personally dictated by me on 07/25/2019. I have reviewed the chart and agree that the record accurately reflects my personal performance of the history, physical exam, assessment and plan. I have also personally directed, reviewed, and agreed with the chart.

## 2019-07-25 NOTE — ASSESSMENT
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 If blood count and iron studies remain stable, the patient will not need an iron infusion at the present time. Have ordered CBC, reticulocyte count, CMP, iron panel, and ferritin. Patient was advised to call office to discuss results and next appointment date.

## 2019-07-25 NOTE — ADDENDUM
[FreeTextEntry1] : I, Zaira Briscoe, acted solely as a scribe for Dr. Argentina Jones on 07/25/2019.

## 2019-07-25 NOTE — HISTORY OF PRESENT ILLNESS
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 Patient's most recent blood results revealed a completely normal CBC with a hemoglobin of 14 and hematocrit of 43.8. The comprehensive metabolic panel was significant for mild elevation of AST and ALT, which the patient has chronically. The ferritin was 66, with an iron saturation of 19%. Patient complains of fatigue but is otherwise feeling generally well at the time. Denies melena, hematochezia, fever, chills, or sweats. Of note, her last iron infusion was in October 2018.

## 2019-07-25 NOTE — PHYSICAL EXAM
[Thin] : thin [Normal] : affect appropriate [de-identified] : multiple scars on abdomen, slight rash on face, multiple senile keratoses, scar right knee

## 2019-07-26 LAB
ALBUMIN SERPL ELPH-MCNC: 4.5 G/DL
ALP BLD-CCNC: 94 U/L
ALT SERPL-CCNC: 50 U/L
ANION GAP SERPL CALC-SCNC: 12 MMOL/L
AST SERPL-CCNC: 41 U/L
BILIRUB SERPL-MCNC: 0.3 MG/DL
BUN SERPL-MCNC: 17 MG/DL
CALCIUM SERPL-MCNC: 10 MG/DL
CHLORIDE SERPL-SCNC: 101 MMOL/L
CO2 SERPL-SCNC: 26 MMOL/L
CREAT SERPL-MCNC: 0.61 MG/DL
FERRITIN SERPL-MCNC: 100 NG/ML
GLUCOSE SERPL-MCNC: 76 MG/DL
IRON SATN MFR SERPL: 24 %
IRON SERPL-MCNC: 89 UG/DL
POTASSIUM SERPL-SCNC: 4.5 MMOL/L
PROT SERPL-MCNC: 7.1 G/DL
SODIUM SERPL-SCNC: 139 MMOL/L
TIBC SERPL-MCNC: 364 UG/DL
UIBC SERPL-MCNC: 275 UG/DL

## 2019-09-11 ENCOUNTER — APPOINTMENT (OUTPATIENT)
Dept: HEMATOLOGY ONCOLOGY | Facility: CLINIC | Age: 84
End: 2019-09-11
Payer: MEDICARE

## 2019-09-11 VITALS
DIASTOLIC BLOOD PRESSURE: 60 MMHG | SYSTOLIC BLOOD PRESSURE: 110 MMHG | OXYGEN SATURATION: 96 % | HEIGHT: 61 IN | TEMPERATURE: 98.4 F | HEART RATE: 81 BPM

## 2019-09-11 LAB
BASOPHILS # BLD AUTO: 0.04 K/UL
BASOPHILS NFR BLD AUTO: 0.5 %
EOSINOPHIL # BLD AUTO: 0.1 K/UL
EOSINOPHIL NFR BLD AUTO: 1.4 %
HCT VFR BLD CALC: 41.8 %
HGB BLD-MCNC: 13.2 G/DL
IMM GRANULOCYTES NFR BLD AUTO: 0.4 %
LYMPHOCYTES # BLD AUTO: 1.27 K/UL
LYMPHOCYTES NFR BLD AUTO: 17.2 %
MAN DIFF?: NORMAL
MCHC RBC-ENTMCNC: 29.5 PG
MCHC RBC-ENTMCNC: 31.6 GM/DL
MCV RBC AUTO: 93.5 FL
MONOCYTES # BLD AUTO: 0.64 K/UL
MONOCYTES NFR BLD AUTO: 8.7 %
NEUTROPHILS # BLD AUTO: 5.3 K/UL
NEUTROPHILS NFR BLD AUTO: 71.8 %
PLATELET # BLD AUTO: 280 K/UL
RBC # BLD: 4.47 M/UL
RBC # BLD: 4.47 M/UL
RBC # FLD: 15.4 %
RETICS # AUTO: 1.4 %
RETICS AGGREG/RBC NFR: 61.2 K/UL
WBC # FLD AUTO: 7.38 K/UL

## 2019-09-11 PROCEDURE — 99213 OFFICE O/P EST LOW 20 MIN: CPT | Mod: 25

## 2019-09-11 PROCEDURE — 36415 COLL VENOUS BLD VENIPUNCTURE: CPT

## 2019-09-11 NOTE — HISTORY OF PRESENT ILLNESS
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 Patient's last blood test results were stable and she did not require an iron infusion. The patient fell 5 weeks ago, sustaining a fracture of the left patella which required surgery. She presently ambulates in a brace with a walker. Patient complains of fatigue, but is otherwise feeling generally well at the time.  Denies melena, hematochezia, fever, chills, or sweats. Of note, her last iron infusion was in October 2018.

## 2019-09-11 NOTE — REVIEW OF SYSTEMS
[Constipation] : constipation [Fatigue] : fatigue [Negative] : Allergic/Immunologic [FreeTextEntry7] : brief episode of constipation [FreeTextEntry9] : Left patellar fracture. LLE immobilized in brace s/p surgery

## 2019-09-11 NOTE — ASSESSMENT
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 She is recovering from surgical repair of left patellar fracture. Regarding patient's chronic iron deficiency, for which she is asymptomatic at this time, we will continue to monitor her iron and ferritin levels.  Have ordered CBC, reticulocyte count, CMP, iron panel, ferritin. Patient will call to discuss results and schedule next appointment date.

## 2019-09-11 NOTE — PHYSICAL EXAM
[Thin] : thin [Normal] : affect appropriate [de-identified] : s/p left patellar fracture, healing wound, mild knee erythema, immobilized in brace [de-identified] : multiple scars on abdomen, slight rash on face, multiple senile keratoses, scar right knee

## 2019-09-11 NOTE — END OF VISIT
[FreeTextEntry3] : All medical record entries made by the Scribe were at my, Dr. Argentina Jones, direction and personally dictated by me on 09/11/2019. I have reviewed the chart and agree that the record accurately reflects my personal performance of the history, physical exam, assessment and plan. I have also personally directed, reviewed, and agreed with the chart.

## 2019-09-11 NOTE — ADDENDUM
[FreeTextEntry1] : I, Dariusz Bonilla, acted solely as a scribe for Dr. Argentina Jones on 09/11/2019.

## 2019-09-12 LAB
ALBUMIN SERPL ELPH-MCNC: 4.3 G/DL
ALP BLD-CCNC: 93 U/L
ALT SERPL-CCNC: 40 U/L
ANION GAP SERPL CALC-SCNC: 11 MMOL/L
AST SERPL-CCNC: 37 U/L
BILIRUB SERPL-MCNC: 0.3 MG/DL
BUN SERPL-MCNC: 17 MG/DL
CALCIUM SERPL-MCNC: 9.9 MG/DL
CHLORIDE SERPL-SCNC: 104 MMOL/L
CO2 SERPL-SCNC: 26 MMOL/L
CREAT SERPL-MCNC: 0.62 MG/DL
FERRITIN SERPL-MCNC: 73 NG/ML
GLUCOSE SERPL-MCNC: 81 MG/DL
IRON SATN MFR SERPL: 23 %
IRON SERPL-MCNC: 78 UG/DL
POTASSIUM SERPL-SCNC: 4.4 MMOL/L
PROT SERPL-MCNC: 7 G/DL
SODIUM SERPL-SCNC: 141 MMOL/L
TIBC SERPL-MCNC: 334 UG/DL
UIBC SERPL-MCNC: 256 UG/DL

## 2019-10-29 ENCOUNTER — APPOINTMENT (OUTPATIENT)
Dept: HEMATOLOGY ONCOLOGY | Facility: CLINIC | Age: 84
End: 2019-10-29
Payer: MEDICARE

## 2019-10-29 VITALS
BODY MASS INDEX: 17.75 KG/M2 | TEMPERATURE: 98.1 F | HEIGHT: 61 IN | OXYGEN SATURATION: 98 % | SYSTOLIC BLOOD PRESSURE: 116 MMHG | WEIGHT: 94 LBS | DIASTOLIC BLOOD PRESSURE: 70 MMHG | HEART RATE: 74 BPM

## 2019-10-29 LAB
BASOPHILS # BLD AUTO: 0.06 K/UL
BASOPHILS NFR BLD AUTO: 0.8 %
EOSINOPHIL # BLD AUTO: 0.12 K/UL
EOSINOPHIL NFR BLD AUTO: 1.6 %
HCT VFR BLD CALC: 42.3 %
HGB BLD-MCNC: 13.5 G/DL
IMM GRANULOCYTES NFR BLD AUTO: 0.5 %
LYMPHOCYTES # BLD AUTO: 1.34 K/UL
LYMPHOCYTES NFR BLD AUTO: 18.1 %
MAN DIFF?: NORMAL
MCHC RBC-ENTMCNC: 28.4 PG
MCHC RBC-ENTMCNC: 31.9 GM/DL
MCV RBC AUTO: 89.1 FL
MONOCYTES # BLD AUTO: 0.64 K/UL
MONOCYTES NFR BLD AUTO: 8.6 %
NEUTROPHILS # BLD AUTO: 5.21 K/UL
NEUTROPHILS NFR BLD AUTO: 70.4 %
PLATELET # BLD AUTO: 253 K/UL
RBC # BLD: 4.75 M/UL
RBC # BLD: 4.75 M/UL
RBC # FLD: 14.2 %
RETICS # AUTO: 1.3 %
RETICS AGGREG/RBC NFR: 62.7 K/UL
WBC # FLD AUTO: 7.41 K/UL

## 2019-10-29 PROCEDURE — 36415 COLL VENOUS BLD VENIPUNCTURE: CPT

## 2019-10-29 PROCEDURE — 99213 OFFICE O/P EST LOW 20 MIN: CPT | Mod: 25

## 2019-10-29 RX ORDER — MULTIVIT-MIN/FA/LYCOPEN/LUTEIN .4-300-25
TABLET ORAL
Refills: 0 | Status: DISCONTINUED | COMMUNITY
End: 2019-10-29

## 2019-10-29 NOTE — HISTORY OF PRESENT ILLNESS
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 Patient's last iron infusion was in 10/2018. Patient's most recent blood results revealed the hemoglobin was normal at 13.2 with a hematocrit of 41.8. The remainder of the complete blood count was normal. The ferritin was within normal range at 73, and the chemistries were normal. The patient did not require an iron infusion at that time. The patient fell 3 months ago, sustaining a fracture of the left patella which required surgery. She complains of fatigue and residual left knee pain, exacerbated by descending stairs. Patient is otherwise feeling generally well, with no acute complaints. Denies hematuria, melena, hematochezia, fever, chills, or sweats. Of note, patient recently received a high dose trivalent influenza vaccine.

## 2019-10-29 NOTE — REASON FOR VISIT
[Follow-Up Visit] : a follow-up visit for [FreeTextEntry2] : iron deficiency anemia, anemia due to chronic blood loss, fatigue

## 2019-10-29 NOTE — END OF VISIT
Linda Lovell MD  [facility], [address]  [address]  [phone]  [email or web address]      See office H and P. No interval change. Date of Surgery Update:  Deysi Saini was seen and examined. History and physical has been reviewed. The patient has been examined.  There have been no significant clinical changes since the completion of the originally dated History and Physical.    Signed By: Theresa Roche MD     June 27, 2018 11:37 AM
[FreeTextEntry3] : All medical record entries made by the Scribe were at my, Dr. Argentina Jones, direction and personally dictated by me on 10/29/2019. I have reviewed the chart and agree that the record accurately reflects my personal performance of the history, physical exam, assessment and plan. I have also personally directed, reviewed, and agreed with the chart.

## 2019-10-29 NOTE — PHYSICAL EXAM
[Thin] : thin [Normal] : affect appropriate [de-identified] : left ankle edema [de-identified] : s/p left patellar fracture, healed wound, s/p right patellar fracture [de-identified] : multiple scars on abdomen, slight rash on face, multiple senile keratoses, scar right knee, scar left knee

## 2019-10-29 NOTE — ADDENDUM
[FreeTextEntry1] : I, Dariusz Bonilla, acted solely as a scribe for Dr. Argentina Jones on 10/29/2019.

## 2019-10-29 NOTE — ASSESSMENT
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 She is still recovering from surgical repair of left patellar fracture. Regarding patient's chronic iron deficiency, for which she is asymptomatic at this time, we will continue to monitor her iron and ferritin levels. Have ordered CBC, reticulocyte count, CMP, B12 and folate, and iron panel. Patient was advised to call office to discuss results and next appointment date. Will send a copy of results to Dr. Maico Turner (gastroenterologist) and Dr. Harleen Lee (internist).

## 2019-10-29 NOTE — REVIEW OF SYSTEMS
[Joint Pain] : joint pain [Joint Stiffness] : joint stiffness [Negative] : Allergic/Immunologic [FreeTextEntry9] : left knee due to prior fall and surgery

## 2019-10-30 ENCOUNTER — LABORATORY RESULT (OUTPATIENT)
Age: 84
End: 2019-10-30

## 2019-10-30 LAB
ALBUMIN SERPL ELPH-MCNC: 4.3 G/DL
ALP BLD-CCNC: 97 U/L
ALT SERPL-CCNC: 45 U/L
ANION GAP SERPL CALC-SCNC: 12 MMOL/L
AST SERPL-CCNC: 42 U/L
BILIRUB SERPL-MCNC: 0.3 MG/DL
BUN SERPL-MCNC: 16 MG/DL
CALCIUM SERPL-MCNC: 9.8 MG/DL
CHLORIDE SERPL-SCNC: 102 MMOL/L
CO2 SERPL-SCNC: 26 MMOL/L
CREAT SERPL-MCNC: 0.61 MG/DL
FOLATE SERPL-MCNC: >20 NG/ML
GLUCOSE SERPL-MCNC: 82 MG/DL
IRON SATN MFR SERPL: 23 %
IRON SERPL-MCNC: 85 UG/DL
POTASSIUM SERPL-SCNC: 4.5 MMOL/L
PROT SERPL-MCNC: 6.8 G/DL
SODIUM SERPL-SCNC: 140 MMOL/L
TIBC SERPL-MCNC: 363 UG/DL
UIBC SERPL-MCNC: 278 UG/DL
VIT B12 SERPL-MCNC: 1332 PG/ML

## 2019-12-18 ENCOUNTER — APPOINTMENT (OUTPATIENT)
Dept: HEMATOLOGY ONCOLOGY | Facility: CLINIC | Age: 84
End: 2019-12-18
Payer: MEDICARE

## 2019-12-18 VITALS
WEIGHT: 95 LBS | BODY MASS INDEX: 17.94 KG/M2 | SYSTOLIC BLOOD PRESSURE: 116 MMHG | HEART RATE: 63 BPM | HEIGHT: 61 IN | DIASTOLIC BLOOD PRESSURE: 80 MMHG | TEMPERATURE: 97.9 F | OXYGEN SATURATION: 99 %

## 2019-12-18 LAB
BASOPHILS # BLD AUTO: 0.05 K/UL
BASOPHILS NFR BLD AUTO: 0.7 %
EOSINOPHIL # BLD AUTO: 0.13 K/UL
EOSINOPHIL NFR BLD AUTO: 1.8 %
HCT VFR BLD CALC: 41.1 %
HGB BLD-MCNC: 13.1 G/DL
IMM GRANULOCYTES NFR BLD AUTO: 0.7 %
LYMPHOCYTES # BLD AUTO: 1.42 K/UL
LYMPHOCYTES NFR BLD AUTO: 19.8 %
MAN DIFF?: NORMAL
MCHC RBC-ENTMCNC: 28.2 PG
MCHC RBC-ENTMCNC: 31.9 GM/DL
MCV RBC AUTO: 88.4 FL
MONOCYTES # BLD AUTO: 0.62 K/UL
MONOCYTES NFR BLD AUTO: 8.6 %
NEUTROPHILS # BLD AUTO: 4.9 K/UL
NEUTROPHILS NFR BLD AUTO: 68.4 %
PLATELET # BLD AUTO: 253 K/UL
RBC # BLD: 4.65 M/UL
RBC # BLD: 4.65 M/UL
RBC # FLD: 15.5 %
RETICS # AUTO: 1.1 %
RETICS AGGREG/RBC NFR: 51.1 K/UL
WBC # FLD AUTO: 7.17 K/UL

## 2019-12-18 PROCEDURE — 99213 OFFICE O/P EST LOW 20 MIN: CPT | Mod: 25

## 2019-12-18 PROCEDURE — 36415 COLL VENOUS BLD VENIPUNCTURE: CPT

## 2019-12-18 NOTE — END OF VISIT
[FreeTextEntry3] : All medical record entries made by the Scribe were at my, Dr. Argentina Jones, direction and personally dictated by me on 12/18/2019. I have reviewed the chart and agree that the record accurately reflects my personal performance of the history, physical exam, assessment and plan. I have also personally directed, reviewed, and agreed with the chart.

## 2019-12-18 NOTE — ASSESSMENT
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 Regarding patient's chronic iron deficiency, for which she is asymptomatic except for fatigue,  will continue to monitor her iron and ferritin levels. Have ordered CBC, reticulocyte count, CMP, ferritin, and iron panel. Patient was advised to call office to discuss results and next appointment date. Will send a copy of results to Dr. Maico Turner (gastroenterologist) and Dr. Harleen Lee (internist).

## 2019-12-18 NOTE — PHYSICAL EXAM
[Thin] : thin [Normal] : affect appropriate [de-identified] : left ankle edema [de-identified] : s/p left patellar fracture, healed wound, s/p right patellar fracture [de-identified] : multiple scars on abdomen, slight rash on face, multiple senile keratoses, scar right knee, scar left knee

## 2019-12-18 NOTE — ADDENDUM
[FreeTextEntry1] : I, Roxie Aviles, acted solely as a scribe for Dr. Argentina Jones on 12/18/2019.

## 2019-12-18 NOTE — HISTORY OF PRESENT ILLNESS
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 Patient's last iron infusion was in 10/2018. Patient's most recent blood results revealed a normal complete blood count with hemoglobin of 13.5 and hematocrit 42.3. The metabolic panel and iron panel were within normal limits. The B12 was 1332. The patient did not require an iron infusion at that time. She complains of fatigue, but is otherwise feeling generally well, with no acute complaints. Denies melena, hematochezia, fever, chills, or sweats.

## 2019-12-19 ENCOUNTER — LABORATORY RESULT (OUTPATIENT)
Age: 84
End: 2019-12-19

## 2019-12-19 LAB
ALBUMIN SERPL ELPH-MCNC: 4.1 G/DL
ALP BLD-CCNC: 106 U/L
ALT SERPL-CCNC: 50 U/L
ANION GAP SERPL CALC-SCNC: 12 MMOL/L
AST SERPL-CCNC: 43 U/L
BILIRUB SERPL-MCNC: 0.2 MG/DL
BUN SERPL-MCNC: 19 MG/DL
CALCIUM SERPL-MCNC: 9.8 MG/DL
CHLORIDE SERPL-SCNC: 104 MMOL/L
CO2 SERPL-SCNC: 25 MMOL/L
CREAT SERPL-MCNC: 0.58 MG/DL
FERRITIN SERPL-MCNC: 21 NG/ML
GLUCOSE SERPL-MCNC: 76 MG/DL
IRON SATN MFR SERPL: 16 %
IRON SERPL-MCNC: 64 UG/DL
POTASSIUM SERPL-SCNC: 4.7 MMOL/L
PROT SERPL-MCNC: 6.7 G/DL
SODIUM SERPL-SCNC: 141 MMOL/L
TIBC SERPL-MCNC: 388 UG/DL
UIBC SERPL-MCNC: 324 UG/DL

## 2020-02-10 ENCOUNTER — APPOINTMENT (OUTPATIENT)
Dept: ORTHOPEDIC SURGERY | Facility: CLINIC | Age: 85
End: 2020-02-10
Payer: MEDICARE

## 2020-02-10 ENCOUNTER — APPOINTMENT (OUTPATIENT)
Dept: HEMATOLOGY ONCOLOGY | Facility: CLINIC | Age: 85
End: 2020-02-10
Payer: MEDICARE

## 2020-02-10 VITALS
BODY MASS INDEX: 18.12 KG/M2 | OXYGEN SATURATION: 99 % | HEIGHT: 61 IN | DIASTOLIC BLOOD PRESSURE: 70 MMHG | HEART RATE: 66 BPM | WEIGHT: 96 LBS | TEMPERATURE: 98 F | SYSTOLIC BLOOD PRESSURE: 118 MMHG

## 2020-02-10 VITALS — HEART RATE: 66 BPM | WEIGHT: 96 LBS | HEIGHT: 62 IN | BODY MASS INDEX: 17.66 KG/M2 | RESPIRATION RATE: 16 BRPM

## 2020-02-10 DIAGNOSIS — Z00.00 ENCOUNTER FOR GENERAL ADULT MEDICAL EXAMINATION W/OUT ABNORMAL FINDINGS: ICD-10-CM

## 2020-02-10 DIAGNOSIS — S60.00XA CONTUSION OF UNSPECIFIED FINGER W/OUT DAMAGE TO NAIL, INITIAL ENCOUNTER: ICD-10-CM

## 2020-02-10 PROCEDURE — 99203 OFFICE O/P NEW LOW 30 MIN: CPT

## 2020-02-10 PROCEDURE — 73130 X-RAY EXAM OF HAND: CPT | Mod: LT

## 2020-02-10 PROCEDURE — 36415 COLL VENOUS BLD VENIPUNCTURE: CPT

## 2020-02-10 PROCEDURE — 99213 OFFICE O/P EST LOW 20 MIN: CPT | Mod: 25

## 2020-02-10 NOTE — HISTORY OF PRESENT ILLNESS
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 Patient's last iron infusion was in 10/2018. Patient's most recent blood results revealed a normal complete blood count, with hemoglobin 13.1 and hematocrit 41.1%. The iron panel and ferritin were at the lower end normal limits. The patient did not require an iron infusion at that time. Since the last visit, patient fell and injured left fifth finger, with bruising and swelling. She also complains of fatigue. Denies melena, hematochezia, fever, chills, or sweats.

## 2020-02-10 NOTE — PHYSICAL EXAM
[de-identified] : Physical exam demonstrates the patient to be alert and oriented x 3 and capable of ambulation. The patient is well-developed and well-nourished in no apparent respiratory distress. The majority of the skin is intact bilaterally in the upper extremities without any bilateral elbow lymphadenopathy. \par \par \par There is good capillary refill of the digits bilaterally. There are no masses palpated or sensitivity over the median and ulnar nerves at the level of the wrist. There is a negative Tinel's and negative Phalen's and a negative carpal tunnel compression test bilaterally. Sensation is intact to light touch bilaterally.\par \par The wrists have symmetric range of motion bilaterally. There is no tenderness over the scaphoid, scapholunate, or lunotriquetral ligaments bilaterally. There is a negative Shell's test bilaterally. There is no tenderness over the distal radial ulnar joint or TFCC and no evidence of instability bilaterally. There is no tenderness over the pisotriquetral joint, hamate hook, or CMC joints bilaterally. There is 5 5 strength of the wrists bilaterally. The patient is nontender over both scaphoids and anatomic snuffbox is bilaterally. There is no clubbing cyanosis or edema.\par \par Tender over her proximal phalanx base of left small finger. No rotational malalignment. Range of motion from 0-80° at MCP joint [de-identified] : PA lateral oblique x-rays demonstrate a minimally displaced proximal phalanx intra-articular fracture

## 2020-02-10 NOTE — HISTORY OF PRESENT ILLNESS
[Right] : right hand dominant [FreeTextEntry1] : Left fifth finger pain after trying to brace herself from falling on 2/8/2020. Patient had a visit with Dr. Jones today and was referred for evaluation. Patient has been using a splint from home.\par \par Patient reports that she had a injury to the same finger in the past and was given a splint involving the DIP. This was most likely for a mallet finger.

## 2020-02-10 NOTE — ASSESSMENT
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 Regarding patient's chronic iron deficiency, for which she is asymptomatic except for fatigue, will continue to monitor her iron and ferritin levels. Have scheduled patient to see hand specialist today for finger injury. Have ordered CBC, reticulocyte count, CMP, ferritin, and iron panel. Patient was advised to call office to discuss results and next appointment date. Will send a copy of results to Dr. Maico Turner (gastroenterologist) and Dr. Harleen Lee (internist).

## 2020-02-10 NOTE — ADDENDUM
[FreeTextEntry1] : I, Roxie Aviles, acted solely as a scribe for Dr. Argentina Jones on 02/10/2020.

## 2020-02-10 NOTE — PHYSICAL EXAM
[Thin] : thin [Normal] : grossly intact [de-identified] : left ankle edema [de-identified] : s/p left patellar fracture,  s/p right patellar fracture, edema and ecchymosis of left fifith finger and hand [de-identified] : multiple scars on abdomen, slight rash on face, multiple senile keratoses, scar right knee, scar left knee

## 2020-02-10 NOTE — CONSULT LETTER
[Dear  ___] : Dear  [unfilled], [Consult Letter:] : I had the pleasure of evaluating your patient, [unfilled]. [Please see my note below.] : Please see my note below. [Consult Closing:] : Thank you very much for allowing me to participate in the care of this patient.  If you have any questions, please do not hesitate to contact me. [Sincerely,] : Sincerely, [FreeTextEntry3] : Byron Raman MD\par Co-Director\par [facility]\par

## 2020-02-10 NOTE — END OF VISIT
[FreeTextEntry3] : All medical record entries made by the Scribe were at my, Dr. Argentina Jones, direction and personally dictated by me on 02/10/2020. I have reviewed the chart and agree that the record accurately reflects my personal performance of the history, physical exam, assessment and plan. I have also personally directed, reviewed, and agreed with the chart.

## 2020-02-10 NOTE — REVIEW OF SYSTEMS
[Fatigue] : fatigue [Joint Pain] : joint pain [Negative] : Allergic/Immunologic [FreeTextEntry9] : trauma and swelling of left fifth finger [de-identified] : bruised left hand

## 2020-02-10 NOTE — ASSESSMENT
[FreeTextEntry1] : The patient has a minimally displaced proximal phalanx fracture. Typically we recommend full cast immobilization but the patient lives alone and did not want to have this. We therefore will have a full time hand-based ulnar gutter splint made. I will see her back in one and a half weeks to assure there is no displacement.

## 2020-02-11 ENCOUNTER — LABORATORY RESULT (OUTPATIENT)
Age: 85
End: 2020-02-11

## 2020-02-11 LAB
ALBUMIN SERPL ELPH-MCNC: 4.3 G/DL
ALP BLD-CCNC: 110 U/L
ALT SERPL-CCNC: 45 U/L
ANION GAP SERPL CALC-SCNC: 13 MMOL/L
AST SERPL-CCNC: 43 U/L
BASOPHILS # BLD AUTO: 0.05 K/UL
BASOPHILS NFR BLD AUTO: 0.7 %
BILIRUB SERPL-MCNC: 0.2 MG/DL
BUN SERPL-MCNC: 16 MG/DL
CALCIUM SERPL-MCNC: 10 MG/DL
CHLORIDE SERPL-SCNC: 104 MMOL/L
CO2 SERPL-SCNC: 26 MMOL/L
CREAT SERPL-MCNC: 0.62 MG/DL
EOSINOPHIL # BLD AUTO: 0.12 K/UL
EOSINOPHIL NFR BLD AUTO: 1.7 %
FERRITIN SERPL-MCNC: 15 NG/ML
GLUCOSE SERPL-MCNC: 73 MG/DL
HCT VFR BLD CALC: 41.4 %
HGB BLD-MCNC: 12.8 G/DL
IMM GRANULOCYTES NFR BLD AUTO: 0.7 %
IRON SATN MFR SERPL: 11 %
IRON SERPL-MCNC: 47 UG/DL
LYMPHOCYTES # BLD AUTO: 1.46 K/UL
LYMPHOCYTES NFR BLD AUTO: 20.1 %
MAN DIFF?: NORMAL
MCHC RBC-ENTMCNC: 27.5 PG
MCHC RBC-ENTMCNC: 30.9 GM/DL
MCV RBC AUTO: 88.8 FL
MONOCYTES # BLD AUTO: 0.62 K/UL
MONOCYTES NFR BLD AUTO: 8.5 %
NEUTROPHILS # BLD AUTO: 4.97 K/UL
NEUTROPHILS NFR BLD AUTO: 68.3 %
PLATELET # BLD AUTO: 273 K/UL
POTASSIUM SERPL-SCNC: 5.2 MMOL/L
PROT SERPL-MCNC: 6.9 G/DL
RBC # BLD: 4.66 M/UL
RBC # BLD: 4.66 M/UL
RBC # FLD: 14.9 %
RETICS # AUTO: 1.5 %
RETICS AGGREG/RBC NFR: 71.8 K/UL
SODIUM SERPL-SCNC: 142 MMOL/L
TIBC SERPL-MCNC: 413 UG/DL
UIBC SERPL-MCNC: 366 UG/DL
WBC # FLD AUTO: 7.27 K/UL

## 2020-02-21 ENCOUNTER — APPOINTMENT (OUTPATIENT)
Dept: ORTHOPEDIC SURGERY | Facility: CLINIC | Age: 85
End: 2020-02-21
Payer: MEDICARE

## 2020-02-21 VITALS — RESPIRATION RATE: 16 BRPM | WEIGHT: 96 LBS | HEIGHT: 62 IN | BODY MASS INDEX: 17.66 KG/M2

## 2020-02-21 PROCEDURE — 99214 OFFICE O/P EST MOD 30 MIN: CPT

## 2020-02-21 PROCEDURE — 73140 X-RAY EXAM OF FINGER(S): CPT | Mod: F4

## 2020-03-09 ENCOUNTER — APPOINTMENT (OUTPATIENT)
Dept: ORTHOPEDIC SURGERY | Facility: CLINIC | Age: 85
End: 2020-03-09
Payer: MEDICARE

## 2020-03-09 VITALS — WEIGHT: 96 LBS | HEIGHT: 62 IN | RESPIRATION RATE: 16 BRPM | BODY MASS INDEX: 17.66 KG/M2

## 2020-03-09 DIAGNOSIS — S62.617A DISPLACED FRACTURE OF PROXIMAL PHALANX OF LEFT LITTLE FINGER, INITIAL ENCOUNTER FOR CLOSED FRACTURE: ICD-10-CM

## 2020-03-09 PROCEDURE — 73140 X-RAY EXAM OF FINGER(S): CPT | Mod: F4

## 2020-03-09 PROCEDURE — 99214 OFFICE O/P EST MOD 30 MIN: CPT

## 2020-03-16 ENCOUNTER — APPOINTMENT (OUTPATIENT)
Dept: HEMATOLOGY ONCOLOGY | Facility: CLINIC | Age: 85
End: 2020-03-16

## 2020-04-13 ENCOUNTER — APPOINTMENT (OUTPATIENT)
Dept: ORTHOPEDIC SURGERY | Facility: CLINIC | Age: 85
End: 2020-04-13

## 2020-05-04 ENCOUNTER — APPOINTMENT (OUTPATIENT)
Dept: HEMATOLOGY ONCOLOGY | Facility: CLINIC | Age: 85
End: 2020-05-04
Payer: MEDICARE

## 2020-05-04 PROCEDURE — 36415 COLL VENOUS BLD VENIPUNCTURE: CPT

## 2020-05-05 ENCOUNTER — APPOINTMENT (OUTPATIENT)
Dept: HEMATOLOGY ONCOLOGY | Facility: CLINIC | Age: 85
End: 2020-05-05
Payer: MEDICARE

## 2020-05-05 LAB
25(OH)D3 SERPL-MCNC: 45.8 NG/ML
ALBUMIN SERPL ELPH-MCNC: 4.2 G/DL
ALP BLD-CCNC: 99 U/L
ALT SERPL-CCNC: 22 U/L
ANION GAP SERPL CALC-SCNC: 11 MMOL/L
AST SERPL-CCNC: 27 U/L
BASOPHILS # BLD AUTO: 0.04 K/UL
BASOPHILS NFR BLD AUTO: 0.7 %
BILIRUB SERPL-MCNC: <0.2 MG/DL
BUN SERPL-MCNC: 19 MG/DL
CALCIUM SERPL-MCNC: 9.5 MG/DL
CHLORIDE SERPL-SCNC: 104 MMOL/L
CO2 SERPL-SCNC: 24 MMOL/L
CREAT SERPL-MCNC: 0.62 MG/DL
EOSINOPHIL # BLD AUTO: 0.08 K/UL
EOSINOPHIL NFR BLD AUTO: 1.4 %
FERRITIN SERPL-MCNC: 12 NG/ML
FOLATE SERPL-MCNC: >20 NG/ML
GLUCOSE SERPL-MCNC: 89 MG/DL
HCT VFR BLD CALC: 35.1 %
HGB BLD-MCNC: 10.6 G/DL
IMM GRANULOCYTES NFR BLD AUTO: 0.3 %
IRON SATN MFR SERPL: 4 %
IRON SERPL-MCNC: 15 UG/DL
LYMPHOCYTES # BLD AUTO: 1.69 K/UL
LYMPHOCYTES NFR BLD AUTO: 29.3 %
MAN DIFF?: NORMAL
MCHC RBC-ENTMCNC: 24.4 PG
MCHC RBC-ENTMCNC: 30.2 GM/DL
MCV RBC AUTO: 80.9 FL
MONOCYTES # BLD AUTO: 0.95 K/UL
MONOCYTES NFR BLD AUTO: 16.5 %
NEUTROPHILS # BLD AUTO: 2.99 K/UL
NEUTROPHILS NFR BLD AUTO: 51.8 %
PLATELET # BLD AUTO: 268 K/UL
POTASSIUM SERPL-SCNC: 4.7 MMOL/L
PROT SERPL-MCNC: 7 G/DL
RBC # BLD: 4.34 M/UL
RBC # BLD: 4.34 M/UL
RBC # FLD: 16.5 %
RETICS # AUTO: 1.3 %
RETICS AGGREG/RBC NFR: 54.5 K/UL
SODIUM SERPL-SCNC: 140 MMOL/L
TIBC SERPL-MCNC: 432 UG/DL
UIBC SERPL-MCNC: 417 UG/DL
VIT B12 SERPL-MCNC: 1231 PG/ML
WBC # FLD AUTO: 5.77 K/UL

## 2020-05-05 PROCEDURE — 99214 OFFICE O/P EST MOD 30 MIN: CPT | Mod: 95

## 2020-05-05 RX ORDER — ACETAMINOPHEN AND CODEINE PHOSPHATE 300; 30 MG/1; MG/1
TABLET ORAL
Refills: 0 | Status: ACTIVE | COMMUNITY

## 2020-05-05 NOTE — HISTORY OF PRESENT ILLNESS
[Home] : at home, [unfilled] , at the time of the visit. 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 Patient's last iron infusion was in 10/2018. Patient's most recent blood results from 5/4/20 revealed a hemoglobin of 10.6, down from 13.1 in February, and hematocrit 35.1%. The iron panel and ferritin were all consistent with severe iron deficiency, with ferritin 12 and iron saturation of 4%, with an elevated TIBC and iron of 15. B 12 is 1231, folate >20 and Vitamin D 45.8. Since the last visit, patient fell and injured/fractured her right  elbow with bruising and swelling and remains in a hard cast.. She also complains of fatigue from lack of exercise while staying at home and difficulty sleeping due to elbow discomfort from cast.. Denies melena, hematochezia, abdominal pain, dizziness, shortness of breath, palpitations, cough, fever, chills, or sweats. \par \par

## 2020-05-05 NOTE — ASSESSMENT
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 Have arranged for patient to have iron infusion (Feraheme) at the [facility] on May 13th and again on May 20th. Orders to be written and sent.\par Patient to continue her oral supplements. \par Vitamin D Deficiency - much improved on present supplements\par Patient to follow up with orthopedist on May 11th for possible removal of cast.\par Patient to return to office 2-3 weeks after completion of second iron infusion.\par All questions answered and plan accepted by patient.

## 2020-05-05 NOTE — REVIEW OF SYSTEMS
[Fatigue] : fatigue [Negative] : Allergic/Immunologic [FreeTextEntry9] : right elbow pain, left finger pain [FreeTextEntry7] : no BRBPR or melena [de-identified] : s/p superficial skin cancers

## 2020-05-05 NOTE — REASON FOR VISIT
[Follow-Up Visit] : a follow-up visit for [FreeTextEntry2] : iron deficiency anemia, anemia of chronic GI blood loss,fatigue, Vitamin D deficiency

## 2020-05-05 NOTE — PHYSICAL EXAM
[Thin] : thin [Normal] : affect appropriate [de-identified] : mild conjunctival pallor [de-identified] : Takes deep breaths without difficulty. No coughing or gross wheezes [de-identified] : Patient denies palpitations [de-identified] : soft to patient's touch, no tenderness [de-identified] : left ankle edema improved [de-identified] : s/p left patellar fracture,  s/p right patellar fracture, s/p left fifth finger fracture, cast right elbow [de-identified] : multiple scars on abdomen, slight rash on face, multiple senile keratoses, scar right knee, scar left knee

## 2020-05-13 ENCOUNTER — LABORATORY RESULT (OUTPATIENT)
Age: 85
End: 2020-05-13

## 2020-05-13 ENCOUNTER — OUTPATIENT (OUTPATIENT)
Dept: OUTPATIENT SERVICES | Facility: HOSPITAL | Age: 85
LOS: 1 days | End: 2020-05-13
Payer: MEDICARE

## 2020-05-13 DIAGNOSIS — D50.9 IRON DEFICIENCY ANEMIA, UNSPECIFIED: ICD-10-CM

## 2020-05-13 PROCEDURE — 96365 THER/PROPH/DIAG IV INF INIT: CPT

## 2020-05-13 RX ORDER — FERUMOXYTOL 510 MG/17ML
510 INJECTION INTRAVENOUS ONCE
Refills: 0 | Status: COMPLETED | OUTPATIENT
Start: 2020-05-13 | End: 2020-05-13

## 2020-05-13 RX ADMIN — FERUMOXYTOL 510 MILLIGRAM(S): 510 INJECTION INTRAVENOUS at 09:45

## 2020-05-13 RX ADMIN — FERUMOXYTOL 117 MILLIGRAM(S): 510 INJECTION INTRAVENOUS at 08:45

## 2020-05-20 ENCOUNTER — OUTPATIENT (OUTPATIENT)
Dept: OUTPATIENT SERVICES | Facility: HOSPITAL | Age: 85
LOS: 1 days | End: 2020-05-20
Payer: MEDICARE

## 2020-05-20 VITALS
TEMPERATURE: 98 F | RESPIRATION RATE: 18 BRPM | DIASTOLIC BLOOD PRESSURE: 69 MMHG | OXYGEN SATURATION: 98 % | HEART RATE: 70 BPM | SYSTOLIC BLOOD PRESSURE: 111 MMHG

## 2020-05-20 VITALS
HEART RATE: 76 BPM | SYSTOLIC BLOOD PRESSURE: 108 MMHG | RESPIRATION RATE: 18 BRPM | HEIGHT: 62 IN | TEMPERATURE: 98 F | WEIGHT: 95.02 LBS | OXYGEN SATURATION: 96 % | DIASTOLIC BLOOD PRESSURE: 68 MMHG

## 2020-05-20 DIAGNOSIS — D50.9 IRON DEFICIENCY ANEMIA, UNSPECIFIED: ICD-10-CM

## 2020-05-20 PROCEDURE — 96365 THER/PROPH/DIAG IV INF INIT: CPT

## 2020-05-20 RX ORDER — FERUMOXYTOL 510 MG/17ML
510 INJECTION INTRAVENOUS ONCE
Refills: 0 | Status: COMPLETED | OUTPATIENT
Start: 2020-05-20 | End: 2020-05-20

## 2020-05-20 RX ADMIN — FERUMOXYTOL 117 MILLIGRAM(S): 510 INJECTION INTRAVENOUS at 09:08

## 2020-05-20 RX ADMIN — FERUMOXYTOL 510 MILLIGRAM(S): 510 INJECTION INTRAVENOUS at 10:09

## 2020-06-03 ENCOUNTER — APPOINTMENT (OUTPATIENT)
Dept: HEMATOLOGY ONCOLOGY | Facility: CLINIC | Age: 85
End: 2020-06-03

## 2020-06-18 ENCOUNTER — APPOINTMENT (OUTPATIENT)
Dept: HEMATOLOGY ONCOLOGY | Facility: CLINIC | Age: 85
End: 2020-06-18
Payer: MEDICARE

## 2020-06-18 VITALS
HEIGHT: 62 IN | DIASTOLIC BLOOD PRESSURE: 84 MMHG | HEART RATE: 71 BPM | SYSTOLIC BLOOD PRESSURE: 122 MMHG | WEIGHT: 95 LBS | BODY MASS INDEX: 17.48 KG/M2 | TEMPERATURE: 96 F | OXYGEN SATURATION: 99 %

## 2020-06-18 LAB
BASOPHILS # BLD AUTO: 0.04 K/UL
BASOPHILS NFR BLD AUTO: 0.5 %
EOSINOPHIL # BLD AUTO: 0.15 K/UL
EOSINOPHIL NFR BLD AUTO: 2 %
HCT VFR BLD CALC: 42.9 %
HGB BLD-MCNC: 13.5 G/DL
IMM GRANULOCYTES NFR BLD AUTO: 0.5 %
LYMPHOCYTES # BLD AUTO: 1.38 K/UL
LYMPHOCYTES NFR BLD AUTO: 18.5 %
MAN DIFF?: NORMAL
MCHC RBC-ENTMCNC: 26.6 PG
MCHC RBC-ENTMCNC: 31.5 GM/DL
MCV RBC AUTO: 84.6 FL
MONOCYTES # BLD AUTO: 0.49 K/UL
MONOCYTES NFR BLD AUTO: 6.6 %
NEUTROPHILS # BLD AUTO: 5.34 K/UL
NEUTROPHILS NFR BLD AUTO: 71.9 %
PLATELET # BLD AUTO: 247 K/UL
RBC # BLD: 5.07 M/UL
RBC # BLD: 5.07 M/UL
RBC # FLD: NORMAL
RETICS # AUTO: 0.9 %
RETICS AGGREG/RBC NFR: 45.5 K/UL
WBC # FLD AUTO: 7.44 K/UL

## 2020-06-18 PROCEDURE — 99213 OFFICE O/P EST LOW 20 MIN: CPT | Mod: 25

## 2020-06-18 PROCEDURE — 36415 COLL VENOUS BLD VENIPUNCTURE: CPT

## 2020-06-18 RX ORDER — FERUMOXYTOL 510 MG/17ML
510 INJECTION INTRAVENOUS
Refills: 0 | Status: ACTIVE | COMMUNITY

## 2020-06-18 NOTE — HISTORY OF PRESENT ILLNESS
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 Patient's last iron infusions were in May 2020 for a hemoglobin of 10.6, down from 13.1 in February, and hematocrit 35.1%. The iron panel and ferritin were all consistent with severe iron deficiency, with ferritin 12 and iron saturation of 4%, with an elevated TIBC and iron of 15. B 12 is 1231, folate >20 and Vitamin D 45.8. Since the prior visit, patient fell and injured/fractured her right elbow with bruising and swelling this remains painful despite cast removal and physical therapy.. She also complains of fatigue from lack of exercise while staying at home . Denies melena, hematochezia, abdominal pain, dizziness, shortness of breath, palpitations, cough, fever, chills, or sweats. Patient has recently tested negative for COVID- 19 by nasal swab and by antibody test.\par \par

## 2020-06-18 NOTE — PHYSICAL EXAM
[Thin] : thin [Normal] : affect appropriate [de-identified] : mild conjunctival pallor [de-identified] : multiple surgical scars [de-identified] : s/p left patellar fracture,  s/p right patellar fracture, s/p left fifth finger fracture, s/p right elbow fracture [de-identified] : multiple scars on abdomen, slight rash on face, multiple senile keratoses, scar right knee, scar left knee

## 2020-06-18 NOTE — ASSESSMENT
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 Patient is s/p two Feraheme iron infusions for a drop in hemoglobin to 10.6 and iron deficiency.\par Will check CBC, reticulocyte count, CMP, iron panel and ferritin. Further recommendations pending results. Patient will call next week for results and next appointment date.\par

## 2020-06-18 NOTE — REVIEW OF SYSTEMS
[Fatigue] : fatigue [Joint Pain] : joint pain [Negative] : Allergic/Immunologic [FreeTextEntry9] : right elbow pain

## 2020-06-18 NOTE — REASON FOR VISIT
[Follow-Up Visit] : a follow-up visit for [FreeTextEntry2] : Iron deficiency anemia, iron deficiency anemia due to chronic GI blood loss, fatigue

## 2020-06-19 ENCOUNTER — LABORATORY RESULT (OUTPATIENT)
Age: 85
End: 2020-06-19

## 2020-06-19 LAB
ALBUMIN SERPL ELPH-MCNC: 4.7 G/DL
ALP BLD-CCNC: 82 U/L
ALT SERPL-CCNC: 58 U/L
ANION GAP SERPL CALC-SCNC: 16 MMOL/L
AST SERPL-CCNC: 43 U/L
BILIRUB SERPL-MCNC: 0.3 MG/DL
BUN SERPL-MCNC: 17 MG/DL
CALCIUM SERPL-MCNC: 10.1 MG/DL
CHLORIDE SERPL-SCNC: 103 MMOL/L
CO2 SERPL-SCNC: 24 MMOL/L
CREAT SERPL-MCNC: 0.58 MG/DL
GLUCOSE SERPL-MCNC: 83 MG/DL
IRON SATN MFR SERPL: 35 %
IRON SERPL-MCNC: 124 UG/DL
POTASSIUM SERPL-SCNC: 4.7 MMOL/L
PROT SERPL-MCNC: 7.3 G/DL
SODIUM SERPL-SCNC: 143 MMOL/L
TIBC SERPL-MCNC: 354 UG/DL
UIBC SERPL-MCNC: 230 UG/DL

## 2020-07-29 ENCOUNTER — APPOINTMENT (OUTPATIENT)
Dept: HEMATOLOGY ONCOLOGY | Facility: CLINIC | Age: 85
End: 2020-07-29
Payer: MEDICARE

## 2020-07-29 VITALS
WEIGHT: 96 LBS | HEART RATE: 77 BPM | TEMPERATURE: 96.6 F | DIASTOLIC BLOOD PRESSURE: 70 MMHG | OXYGEN SATURATION: 98 % | HEIGHT: 62 IN | BODY MASS INDEX: 17.66 KG/M2 | SYSTOLIC BLOOD PRESSURE: 136 MMHG

## 2020-07-29 PROCEDURE — 99213 OFFICE O/P EST LOW 20 MIN: CPT | Mod: 25

## 2020-07-29 PROCEDURE — 36415 COLL VENOUS BLD VENIPUNCTURE: CPT

## 2020-07-29 NOTE — PHYSICAL EXAM
[Thin] : thin [Normal] : affect appropriate [de-identified] : mild conjunctival pallor [de-identified] : multiple surgical scars [de-identified] : s/p left patellar fracture,  s/p right patellar fracture, s/p left fifth finger fracture, s/p right elbow fracture [de-identified] : multiple scars on abdomen,  multiple senile keratoses, scar right knee, scar left knee

## 2020-07-29 NOTE — ASSESSMENT
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 Patient is s/p two Feraheme iron infusions in May 2020. She is monitored closely for drop in hemoglobin as she chronically loses blood from the GI tract which is not usually obvious. Plan is to keep ahead by replacing iron by infusion when needed.\par Have ordered CBC, reticulocyte count, CMP, iron panel, ferritin. Recommendations pending the results. Patient will call for results and next appointment date.\par

## 2020-07-29 NOTE — REVIEW OF SYSTEMS
[Fatigue] : fatigue [Negative] : Allergic/Immunologic [Night Sweats] : no night sweats [Chills] : no chills [Fever] : no fever [Recent Change In Weight] : ~T no recent weight change [FreeTextEntry7] : occasional diarrhea [FreeTextEntry9] : pain/stiffness left little finger s/p healed fracture, Right arm / elbow improving.

## 2020-07-29 NOTE — HISTORY OF PRESENT ILLNESS
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 Patient's last iron infusions were in May 2020 for a hemoglobin of 10.6, down from 13.1 in February, and hematocrit 35.1%. The iron panel and ferritin were all consistent with severe iron deficiency. At last visit, the hemoglobin was much improved at 13.5. Liver function tests were slightly elevated (chronically) and patient follows up with her gastroenterologist. Her right arm is improving regarding range of motion. . She also complains of fatigue from lack of exercise while staying at home. She has occasional diarrhea.. Denies melena, hematochezia, abdominal pain, dizziness, shortness of breath, palpitations, cough, fever, chills, or sweats. Patient tested negative for COVID- 19 by nasal swab and by antibody test in June..\par \par  \par

## 2020-07-30 ENCOUNTER — LABORATORY RESULT (OUTPATIENT)
Age: 85
End: 2020-07-30

## 2020-07-30 LAB
ALBUMIN SERPL ELPH-MCNC: 4.3 G/DL
ALP BLD-CCNC: 78 U/L
ALT SERPL-CCNC: 55 U/L
ANION GAP SERPL CALC-SCNC: 9 MMOL/L
AST SERPL-CCNC: 42 U/L
BASOPHILS # BLD AUTO: 0.04 K/UL
BASOPHILS NFR BLD AUTO: 0.5 %
BILIRUB SERPL-MCNC: 0.2 MG/DL
BUN SERPL-MCNC: 16 MG/DL
CALCIUM SERPL-MCNC: 10 MG/DL
CHLORIDE SERPL-SCNC: 105 MMOL/L
CO2 SERPL-SCNC: 27 MMOL/L
CREAT SERPL-MCNC: 0.6 MG/DL
EOSINOPHIL # BLD AUTO: 0.17 K/UL
EOSINOPHIL NFR BLD AUTO: 2 %
FERRITIN SERPL-MCNC: 280 NG/ML
GLUCOSE SERPL-MCNC: 84 MG/DL
HCT VFR BLD CALC: 41.3 %
HGB BLD-MCNC: 13.4 G/DL
IMM GRANULOCYTES NFR BLD AUTO: 0.7 %
IRON SATN MFR SERPL: 27 %
IRON SERPL-MCNC: 92 UG/DL
LYMPHOCYTES # BLD AUTO: 1.66 K/UL
LYMPHOCYTES NFR BLD AUTO: 19.9 %
MAN DIFF?: NORMAL
MCHC RBC-ENTMCNC: 28.4 PG
MCHC RBC-ENTMCNC: 32.4 GM/DL
MCV RBC AUTO: 87.5 FL
MONOCYTES # BLD AUTO: 0.65 K/UL
MONOCYTES NFR BLD AUTO: 7.8 %
NEUTROPHILS # BLD AUTO: 5.76 K/UL
NEUTROPHILS NFR BLD AUTO: 69.1 %
PLATELET # BLD AUTO: 247 K/UL
POTASSIUM SERPL-SCNC: 4.8 MMOL/L
PROT SERPL-MCNC: 6.8 G/DL
RBC # BLD: 4.72 M/UL
RBC # BLD: 4.72 M/UL
RBC # FLD: 21.8 %
RETICS # AUTO: 1 %
RETICS AGGREG/RBC NFR: 47.7 K/UL
SODIUM SERPL-SCNC: 141 MMOL/L
TIBC SERPL-MCNC: 335 UG/DL
UIBC SERPL-MCNC: 243 UG/DL
WBC # FLD AUTO: 8.34 K/UL

## 2020-09-10 ENCOUNTER — APPOINTMENT (OUTPATIENT)
Dept: HEMATOLOGY ONCOLOGY | Facility: CLINIC | Age: 85
End: 2020-09-10
Payer: MEDICARE

## 2020-09-10 VITALS
DIASTOLIC BLOOD PRESSURE: 70 MMHG | TEMPERATURE: 97.1 F | HEART RATE: 78 BPM | BODY MASS INDEX: 17.66 KG/M2 | WEIGHT: 96 LBS | HEIGHT: 62 IN | OXYGEN SATURATION: 92 % | SYSTOLIC BLOOD PRESSURE: 140 MMHG

## 2020-09-10 PROCEDURE — 99213 OFFICE O/P EST LOW 20 MIN: CPT

## 2020-09-10 NOTE — END OF VISIT
[FreeTextEntry3] : All medical record entries made by the Scribe were at my, Dr. Argentina Jones, direction and personally dictated by me on 09/10/2020. I have reviewed the chart and agree that the record accurately reflects my personal performance of the history, physical exam, assessment and plan. I have also personally directed, reviewed, and agreed with the chart.

## 2020-09-10 NOTE — ADDENDUM
[FreeTextEntry1] : I, Roxie Aviles, acted solely as a scribe for Dr. Argentina Jones on 09/10/2020.

## 2020-09-10 NOTE — HISTORY OF PRESENT ILLNESS
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 Patient's last iron infusions were in May 2020. Most recent blood results from July 2020 revealed  a hemoglobin of 13.4 and hematocrit 41.3%. The iron panel was normal and the ferritin was elevated at 280, reflecting the recent iron infusion. Liver function tests were slightly elevated (chronically) and patient follows up with her gastroenterologist. Patient has full range of motion in right arm after repair of finger and elbow. Patient complains of fatigue and some minor rectal bleeding. She admits to hemorrhoids.Patient states she takes Imodium daily to control chronic diarrhea. Denies recent infection, constipation, fever, chills, or sweats.

## 2020-09-10 NOTE — ASSESSMENT
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 Patient is s/p two Feraheme iron infusions in May 2020, with noted improvement. Plan is to keep ahead by replacing iron by infusion when needed. Have ordered CBC, CMP, iron panel, ferritin, Vitamin D, B12, and folate. Recommendations pending the results. Patient will call for results and next appointment date.

## 2020-09-10 NOTE — REVIEW OF SYSTEMS
[Fatigue] : fatigue [Joint Pain] : joint pain [Diarrhea] : diarrhea [Negative] : Endocrine [Chills] : no chills [Night Sweats] : no night sweats [Fever] : no fever [Recent Change In Weight] : ~T no recent weight change [Abdominal Pain] : no abdominal pain [Vomiting] : no vomiting [Constipation] : no constipation [FreeTextEntry7] : very brief rectal bleeding, admits to hemorrhoids, takes Imodium daily to successfully  control chronic diarrhea [FreeTextEntry9] : full ROM now since repair of finger and elbow [de-identified] : r

## 2020-09-10 NOTE — PHYSICAL EXAM
[Thin] : thin [Normal] : affect appropriate [de-identified] : multiple surgical scars [de-identified] : mild conjunctival pallor [de-identified] : s/p left patellar fracture,  s/p right patellar fracture, s/p left fifth finger fracture, s/p right elbow fracture [de-identified] : multiple scars on abdomen,  multiple senile keratoses, scar right knee, scar left knee

## 2020-09-11 LAB
25(OH)D3 SERPL-MCNC: 32.7 NG/ML
ALBUMIN SERPL ELPH-MCNC: 4.6 G/DL
ALP BLD-CCNC: 90 U/L
ALT SERPL-CCNC: 57 U/L
ANION GAP SERPL CALC-SCNC: 14 MMOL/L
AST SERPL-CCNC: 43 U/L
BASOPHILS # BLD AUTO: 0.05 K/UL
BASOPHILS NFR BLD AUTO: 0.7 %
BILIRUB SERPL-MCNC: 0.3 MG/DL
BUN SERPL-MCNC: 15 MG/DL
CALCIUM SERPL-MCNC: 9.7 MG/DL
CHLORIDE SERPL-SCNC: 104 MMOL/L
CO2 SERPL-SCNC: 24 MMOL/L
CREAT SERPL-MCNC: 0.55 MG/DL
EOSINOPHIL # BLD AUTO: 0.12 K/UL
EOSINOPHIL NFR BLD AUTO: 1.7 %
FERRITIN SERPL-MCNC: 178 NG/ML
FOLATE SERPL-MCNC: 16 NG/ML
GLUCOSE SERPL-MCNC: 91 MG/DL
HCT VFR BLD CALC: 43.4 %
HGB BLD-MCNC: 13.9 G/DL
IMM GRANULOCYTES NFR BLD AUTO: 0.4 %
IRON SATN MFR SERPL: 24 %
IRON SERPL-MCNC: 90 UG/DL
LYMPHOCYTES # BLD AUTO: 1.68 K/UL
LYMPHOCYTES NFR BLD AUTO: 23.3 %
MAN DIFF?: NORMAL
MCHC RBC-ENTMCNC: 30.6 PG
MCHC RBC-ENTMCNC: 32 GM/DL
MCV RBC AUTO: 95.6 FL
MONOCYTES # BLD AUTO: 0.55 K/UL
MONOCYTES NFR BLD AUTO: 7.6 %
NEUTROPHILS # BLD AUTO: 4.78 K/UL
NEUTROPHILS NFR BLD AUTO: 66.3 %
PLATELET # BLD AUTO: 262 K/UL
POTASSIUM SERPL-SCNC: 4 MMOL/L
PROT SERPL-MCNC: 7.1 G/DL
RBC # BLD: 4.54 M/UL
RBC # FLD: 14.1 %
SODIUM SERPL-SCNC: 142 MMOL/L
TIBC SERPL-MCNC: 367 UG/DL
UIBC SERPL-MCNC: 277 UG/DL
VIT B12 SERPL-MCNC: 1428 PG/ML
WBC # FLD AUTO: 7.21 K/UL

## 2020-10-22 ENCOUNTER — APPOINTMENT (OUTPATIENT)
Dept: HEMATOLOGY ONCOLOGY | Facility: CLINIC | Age: 85
End: 2020-10-22
Payer: MEDICARE

## 2020-10-22 VITALS
BODY MASS INDEX: 17.85 KG/M2 | WEIGHT: 97 LBS | HEIGHT: 62 IN | DIASTOLIC BLOOD PRESSURE: 68 MMHG | TEMPERATURE: 96.7 F | SYSTOLIC BLOOD PRESSURE: 130 MMHG | HEART RATE: 75 BPM | OXYGEN SATURATION: 98 %

## 2020-10-22 LAB
RBC # BLD: 4.72 M/UL
RETICS # AUTO: 1.5 %
RETICS AGGREG/RBC NFR: 72.7 K/UL

## 2020-10-22 PROCEDURE — 36415 COLL VENOUS BLD VENIPUNCTURE: CPT

## 2020-10-22 PROCEDURE — 99213 OFFICE O/P EST LOW 20 MIN: CPT | Mod: 25

## 2020-10-22 NOTE — PHYSICAL EXAM
[Thin] : thin [Normal] : affect appropriate [de-identified] : multiple surgical scars [de-identified] : s/p left patellar fracture,  s/p right patellar fracture, s/p left fifth finger fracture, s/p right elbow fracture [de-identified] : multiple scars on abdomen,  multiple senile keratoses, scar right knee, scar left knee

## 2020-10-22 NOTE — ASSESSMENT
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 Patient is s/p two Feraheme iron infusions in May 2020, with noted improvement. Plan is to keep ahead by replacing iron by infusion when needed. Have ordered CBC, CMP, iron panel, ferritin, Vitamin D, reticulocyte count. Recommendations pending the results. Patient will call for results and next appointment date.

## 2020-10-22 NOTE — ADDENDUM
[FreeTextEntry1] : I, Roxie Aviles, acted solely as a scribe for Dr. Argentina Jones on 10/22/2020.

## 2020-10-22 NOTE — HISTORY OF PRESENT ILLNESS
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 Patient's last iron infusions were in May 2020. Most recent blood results from September 2020 revealed a hemoglobin of 13.9 and hematocrit 43.4%. The iron panel was normal and the ferritin was elevated at 178, reflecting the recent iron infusion. Liver function tests were slightly elevated (chronically) and patient follows up with her gastroenterologist. Since the last visit, patient had Reclast treatment. Patient complains of fatigue. Denies recent infection, fever, chills, or sweats. Of note, patient is scheduled to see her PCP, Dr. Harleen Lee, next month.

## 2020-10-24 LAB
25(OH)D3 SERPL-MCNC: 32.3 NG/ML
ALBUMIN SERPL ELPH-MCNC: 4.6 G/DL
ALP BLD-CCNC: 86 U/L
ALT SERPL-CCNC: 54 U/L
ANION GAP SERPL CALC-SCNC: 15 MMOL/L
AST SERPL-CCNC: 43 U/L
BASOPHILS # BLD AUTO: 0 K/UL
BASOPHILS # BLD AUTO: 0 K/UL
BASOPHILS NFR BLD AUTO: 0 %
BASOPHILS NFR BLD AUTO: 0 %
BILIRUB SERPL-MCNC: 0.2 MG/DL
BUN SERPL-MCNC: 17 MG/DL
CALCIUM SERPL-MCNC: 9.5 MG/DL
CHLORIDE SERPL-SCNC: 101 MMOL/L
CO2 SERPL-SCNC: 24 MMOL/L
CREAT SERPL-MCNC: 0.54 MG/DL
EOSINOPHIL # BLD AUTO: 0.15 K/UL
EOSINOPHIL # BLD AUTO: 0.15 K/UL
EOSINOPHIL NFR BLD AUTO: 2 %
EOSINOPHIL NFR BLD AUTO: 2 %
FERRITIN SERPL-MCNC: 149 NG/ML
GLUCOSE SERPL-MCNC: 84 MG/DL
HCT VFR BLD CALC: 44.3 %
HGB BLD-MCNC: 14.2 G/DL
IRON SATN MFR SERPL: 24 %
IRON SERPL-MCNC: 82 UG/DL
LYMPHOCYTES # BLD AUTO: 1.2 K/UL
LYMPHOCYTES # BLD AUTO: 1.2 K/UL
LYMPHOCYTES NFR BLD AUTO: 16 %
LYMPHOCYTES NFR BLD AUTO: 16 %
MAN DIFF?: NORMAL
MCHC RBC-ENTMCNC: 30.1 PG
MCHC RBC-ENTMCNC: 32.1 GM/DL
MCV RBC AUTO: 93.9 FL
MONOCYTES # BLD AUTO: 0.52 K/UL
MONOCYTES # BLD AUTO: 0.52 K/UL
MONOCYTES NFR BLD AUTO: 7 %
MONOCYTES NFR BLD AUTO: 7 %
MSMEAR: NORMAL
MYELOCYTES NFR BLD: 1 %
NEUTROPHILS # BLD AUTO: 5.54 K/UL
NEUTROPHILS # BLD AUTO: 5.54 K/UL
NEUTROPHILS NFR BLD AUTO: 74 %
NEUTROPHILS NFR BLD AUTO: 74 %
NRBC # BLD MANUAL: 0 /100
PLAT MORPH BLD: ABNORMAL
PLATELET # BLD AUTO: 281 K/UL
POTASSIUM SERPL-SCNC: 4.6 MMOL/L
PROT SERPL-MCNC: 7.2 G/DL
RBC # BLD: 4.72 M/UL
RBC # FLD: 13.5 %
RBC BLD AUTO: NORMAL
SODIUM SERPL-SCNC: 140 MMOL/L
TIBC SERPL-MCNC: 342 UG/DL
UIBC SERPL-MCNC: 259 UG/DL
WBC # FLD AUTO: 7.49 K/UL

## 2020-12-10 ENCOUNTER — APPOINTMENT (OUTPATIENT)
Dept: HEMATOLOGY ONCOLOGY | Facility: CLINIC | Age: 85
End: 2020-12-10
Payer: MEDICARE

## 2020-12-10 VITALS
OXYGEN SATURATION: 98 % | SYSTOLIC BLOOD PRESSURE: 120 MMHG | DIASTOLIC BLOOD PRESSURE: 84 MMHG | TEMPERATURE: 97.6 F | HEART RATE: 81 BPM | BODY MASS INDEX: 17.3 KG/M2 | WEIGHT: 94 LBS | HEIGHT: 62 IN

## 2020-12-10 DIAGNOSIS — S62.102A FRACTURE OF UNSPECIFIED CARPAL BONE, LEFT WRIST, INITIAL ENCOUNTER FOR CLOSED FRACTURE: ICD-10-CM

## 2020-12-10 LAB
RBC # BLD: 4.85 M/UL
RETICS # AUTO: 1.2 %
RETICS AGGREG/RBC NFR: 56.3 K/UL

## 2020-12-10 PROCEDURE — 36415 COLL VENOUS BLD VENIPUNCTURE: CPT

## 2020-12-10 PROCEDURE — 99213 OFFICE O/P EST LOW 20 MIN: CPT | Mod: 25

## 2020-12-10 NOTE — ASSESSMENT
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 Patient is s/p two Feraheme iron infusions in May 2020, with noted improvement. Plan is to keep ahead by replacing iron by infusion when needed. Have ordered CBC, CMP, iron panel, ferritin, reticulocyte count. Recommendations pending the results. Patient will call for results and next appointment date.

## 2020-12-10 NOTE — REVIEW OF SYSTEMS
[Fatigue] : fatigue [Negative] : Allergic/Immunologic [Fever] : no fever [Chills] : no chills [Night Sweats] : no night sweats [Recent Change In Weight] : ~T no recent weight change [Diarrhea] : no diarrhea [FreeTextEntry7] : no visible blood in stool [FreeTextEntry9] : recent left wrist fracture, casted

## 2020-12-10 NOTE — ADDENDUM
[FreeTextEntry1] : I, Roxie Aviles, acted solely as a scribe for Dr. Argentina Jones on 12/10/2020.

## 2020-12-10 NOTE — END OF VISIT
[FreeTextEntry3] : All medical record entries made by the Scribe were at my, Dr. Argentina Jones, direction and personally dictated by me on 12/10/2020. I have reviewed the chart and agree that the record accurately reflects my personal performance of the history, physical exam, assessment and plan. I have also personally directed, reviewed, and agreed with the chart.

## 2020-12-10 NOTE — HISTORY OF PRESENT ILLNESS
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 Patient's last iron infusions were in May 2020. Most recent blood results from October 2020 revealed a hemoglobin stable at 14.2. The iron panel was normal and the ferritin 149. Liver function tests were slightly elevated (chronically) and patient follows up with her gastroenterologist. Since the last visit, patient had a fall and fractured her left wrist approximately 4-5 weeks ago and is presently in a cast. Patient complains of fatigue. Denies recent infection, fever, chills, or sweats.

## 2020-12-10 NOTE — PHYSICAL EXAM
[Thin] : thin [Normal] : affect appropriate [de-identified] : multiple surgical scars [de-identified] : s/p left patellar fracture,  s/p right patellar fracture, s/p left fifth finger fracture, s/p right elbow fracture, presently with left wrist fracture in cast [de-identified] : multiple scars on abdomen,  multiple senile keratoses, scar right knee, scar left knee

## 2020-12-11 LAB
ALBUMIN SERPL ELPH-MCNC: 4.5 G/DL
ALP BLD-CCNC: 107 U/L
ALT SERPL-CCNC: 53 U/L
ANION GAP SERPL CALC-SCNC: 11 MMOL/L
AST SERPL-CCNC: 38 U/L
BASOPHILS # BLD AUTO: 0.16 K/UL
BASOPHILS # BLD AUTO: 0.16 K/UL
BASOPHILS NFR BLD AUTO: 1.8 %
BASOPHILS NFR BLD AUTO: 1.8 %
BILIRUB SERPL-MCNC: 0.3 MG/DL
BUN SERPL-MCNC: 15 MG/DL
CALCIUM SERPL-MCNC: 10.4 MG/DL
CHLORIDE SERPL-SCNC: 105 MMOL/L
CO2 SERPL-SCNC: 27 MMOL/L
CREAT SERPL-MCNC: 0.63 MG/DL
EOSINOPHIL # BLD AUTO: 0 K/UL
EOSINOPHIL # BLD AUTO: 0 K/UL
EOSINOPHIL NFR BLD AUTO: 0 %
EOSINOPHIL NFR BLD AUTO: 0 %
FERRITIN SERPL-MCNC: 150 NG/ML
GLUCOSE SERPL-MCNC: 87 MG/DL
HCT VFR BLD CALC: 44.5 %
HGB BLD-MCNC: 14.2 G/DL
IRON SATN MFR SERPL: 24 %
IRON SERPL-MCNC: 78 UG/DL
LYMPHOCYTES # BLD AUTO: 1.18 K/UL
LYMPHOCYTES # BLD AUTO: 1.18 K/UL
LYMPHOCYTES NFR BLD AUTO: 13.3 %
LYMPHOCYTES NFR BLD AUTO: 13.3 %
MAN DIFF?: NORMAL
MCHC RBC-ENTMCNC: 29.3 PG
MCHC RBC-ENTMCNC: 31.9 GM/DL
MCV RBC AUTO: 91.8 FL
MONOCYTES # BLD AUTO: 0.7 K/UL
MONOCYTES # BLD AUTO: 0.7 K/UL
MONOCYTES NFR BLD AUTO: 7.9 %
MONOCYTES NFR BLD AUTO: 7.9 %
MSMEAR: NORMAL
NEUTROPHILS # BLD AUTO: 6.83 K/UL
NEUTROPHILS # BLD AUTO: 6.83 K/UL
NEUTROPHILS NFR BLD AUTO: 77 %
NEUTROPHILS NFR BLD AUTO: 77 %
PLAT MORPH BLD: ABNORMAL
PLATELET # BLD AUTO: 269 K/UL
POTASSIUM SERPL-SCNC: 5 MMOL/L
PROT SERPL-MCNC: 6.9 G/DL
RBC # BLD: 4.85 M/UL
RBC # FLD: 13.5 %
RBC BLD AUTO: NORMAL
SCHISTOCYTES BLD QL SMEAR: SLIGHT
SODIUM SERPL-SCNC: 143 MMOL/L
TIBC SERPL-MCNC: 332 UG/DL
UIBC SERPL-MCNC: 254 UG/DL
WBC # FLD AUTO: 8.87 K/UL

## 2020-12-15 ENCOUNTER — NON-APPOINTMENT (OUTPATIENT)
Age: 85
End: 2020-12-15

## 2021-02-11 ENCOUNTER — APPOINTMENT (OUTPATIENT)
Dept: HEMATOLOGY ONCOLOGY | Facility: CLINIC | Age: 86
End: 2021-02-11
Payer: MEDICARE

## 2021-02-11 VITALS
WEIGHT: 95 LBS | HEART RATE: 79 BPM | BODY MASS INDEX: 17.48 KG/M2 | SYSTOLIC BLOOD PRESSURE: 132 MMHG | OXYGEN SATURATION: 95 % | TEMPERATURE: 96.7 F | DIASTOLIC BLOOD PRESSURE: 80 MMHG | HEIGHT: 62 IN

## 2021-02-11 LAB
RBC # BLD: 4.83 M/UL
RETICS # AUTO: 1.2 %
RETICS AGGREG/RBC NFR: 55.5 K/UL

## 2021-02-11 PROCEDURE — 36415 COLL VENOUS BLD VENIPUNCTURE: CPT

## 2021-02-11 PROCEDURE — 99213 OFFICE O/P EST LOW 20 MIN: CPT | Mod: 25

## 2021-02-11 NOTE — HISTORY OF PRESENT ILLNESS
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 Patient's last iron infusions were in May 2020. Most recent blood results from October 2020 revealed a hemoglobin stable at 14.2. The iron panel was normal and the ferritin 150. Liver function tests were slightly elevated (chronically) and patient follows up with her gastroenterologist. Patient complains of fatigue. She fractured her left wrist in November and is currently in a brace and undergoing occupational therapy. Denies recent infection, fever, chills, or sweats. Of note, she has received both Pfizer Covid vaccines which were well tolerated.

## 2021-02-11 NOTE — ASSESSMENT
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 Patient is s/p two Feraheme iron infusions in May 2020, with noted improvement. Plan is to keep ahead by replacing iron by infusion when needed. Have ordered B12 and folate, CBC, CMP, iron panel, ferritin, reticulocyte count. Recommendations pending the results. Patient will call for results and next appointment date.

## 2021-02-11 NOTE — END OF VISIT
[FreeTextEntry3] : All medical record entries made by the Scribe were at my, Dr. Argentina Jones, direction and personally dictated by me on 02/11/2021. I have reviewed the chart and agree that the record accurately reflects my personal performance of the history, physical exam, assessment and plan. I have also personally directed, reviewed, and agreed with the chart.

## 2021-02-11 NOTE — ADDENDUM
[FreeTextEntry1] : I, Roxie Aviles, acted solely as a scribe for Dr. Argentina Jones on 02/11/2021.

## 2021-02-11 NOTE — REVIEW OF SYSTEMS
[Fatigue] : fatigue [Negative] : Allergic/Immunologic [FreeTextEntry9] : left wrist in brace s/p fracture, has occupational therapy

## 2021-02-11 NOTE — PHYSICAL EXAM
[Thin] : thin [Normal] : affect appropriate [de-identified] : multiple surgical scars [de-identified] : s/p left patellar fracture,  s/p right patellar fracture, s/p left fifth finger fracture, s/p right elbow fracture, presently s/p left wrist fracture in brace [de-identified] : multiple scars on abdomen,  multiple senile keratoses, scar right knee, scar left knee

## 2021-02-12 LAB
ALBUMIN SERPL ELPH-MCNC: 4.4 G/DL
ALP BLD-CCNC: 107 U/L
ALT SERPL-CCNC: 42 U/L
ANION GAP SERPL CALC-SCNC: 13 MMOL/L
AST SERPL-CCNC: 39 U/L
BASOPHILS # BLD AUTO: 0.08 K/UL
BASOPHILS # BLD AUTO: 0.08 K/UL
BASOPHILS NFR BLD AUTO: 0.9 %
BASOPHILS NFR BLD AUTO: 0.9 %
BILIRUB SERPL-MCNC: 0.2 MG/DL
BUN SERPL-MCNC: 20 MG/DL
CALCIUM SERPL-MCNC: 10.1 MG/DL
CHLORIDE SERPL-SCNC: 103 MMOL/L
CO2 SERPL-SCNC: 25 MMOL/L
CREAT SERPL-MCNC: 0.66 MG/DL
EOSINOPHIL # BLD AUTO: 0.22 K/UL
EOSINOPHIL # BLD AUTO: 0.22 K/UL
EOSINOPHIL NFR BLD AUTO: 2.6 %
EOSINOPHIL NFR BLD AUTO: 2.6 %
FERRITIN SERPL-MCNC: 91 NG/ML
FOLATE SERPL-MCNC: 12.5 NG/ML
GLUCOSE SERPL-MCNC: 72 MG/DL
HCT VFR BLD CALC: 43 %
HGB BLD-MCNC: 13.8 G/DL
IRON SATN MFR SERPL: 19 %
IRON SERPL-MCNC: 71 UG/DL
LYMPHOCYTES # BLD AUTO: 1.12 K/UL
LYMPHOCYTES # BLD AUTO: 1.12 K/UL
LYMPHOCYTES NFR BLD AUTO: 13.2 %
LYMPHOCYTES NFR BLD AUTO: 13.2 %
MAN DIFF?: NORMAL
MCHC RBC-ENTMCNC: 28.6 PG
MCHC RBC-ENTMCNC: 32.1 GM/DL
MCV RBC AUTO: 89 FL
MONOCYTES # BLD AUTO: 0.52 K/UL
MONOCYTES # BLD AUTO: 0.52 K/UL
MONOCYTES NFR BLD AUTO: 6.1 %
MONOCYTES NFR BLD AUTO: 6.1 %
MSMEAR: NORMAL
NEUTROPHILS # BLD AUTO: 6.56 K/UL
NEUTROPHILS # BLD AUTO: 6.56 K/UL
NEUTROPHILS NFR BLD AUTO: 77.2 %
NEUTROPHILS NFR BLD AUTO: 77.2 %
PLAT MORPH BLD: ABNORMAL
PLATELET # BLD AUTO: 278 K/UL
POTASSIUM SERPL-SCNC: 4.5 MMOL/L
PROT SERPL-MCNC: 7.1 G/DL
RBC # BLD: 4.83 M/UL
RBC # FLD: 14.5 %
RBC BLD AUTO: NORMAL
SMUDGE CELLS # BLD: PRESENT
SODIUM SERPL-SCNC: 140 MMOL/L
TIBC SERPL-MCNC: 373 UG/DL
UIBC SERPL-MCNC: 302 UG/DL
VIT B12 SERPL-MCNC: 1134 PG/ML
WBC # FLD AUTO: 8.5 K/UL

## 2021-02-17 ENCOUNTER — NON-APPOINTMENT (OUTPATIENT)
Age: 86
End: 2021-02-17

## 2021-04-12 ENCOUNTER — APPOINTMENT (OUTPATIENT)
Dept: HEMATOLOGY ONCOLOGY | Facility: CLINIC | Age: 86
End: 2021-04-12
Payer: MEDICARE

## 2021-04-12 VITALS
WEIGHT: 96 LBS | TEMPERATURE: 96 F | OXYGEN SATURATION: 98 % | HEART RATE: 62 BPM | SYSTOLIC BLOOD PRESSURE: 122 MMHG | HEIGHT: 62 IN | DIASTOLIC BLOOD PRESSURE: 80 MMHG | BODY MASS INDEX: 17.66 KG/M2

## 2021-04-12 LAB
RBC # BLD: 4.78 M/UL
RETICS # AUTO: 1.4 %
RETICS AGGREG/RBC NFR: 68.8 K/UL

## 2021-04-12 PROCEDURE — 99213 OFFICE O/P EST LOW 20 MIN: CPT | Mod: 25

## 2021-04-12 PROCEDURE — 36415 COLL VENOUS BLD VENIPUNCTURE: CPT

## 2021-04-12 NOTE — END OF VISIT
[FreeTextEntry3] : All medical record entries made by the Scribe were at my, Dr. Argentina Jones, direction and personally dictated by me on 04/12/2021. I have reviewed the chart and agree that the record accurately reflects my personal performance of the history, physical exam, assessment and plan. I have also personally directed, reviewed, and agreed with the chart.

## 2021-04-12 NOTE — PHYSICAL EXAM
[Thin] : thin [Normal] : affect appropriate [de-identified] : RRR, S1, S2, ?murmur, no rubs or gallops [de-identified] : multiple surgical scars [de-identified] : multiple scars on abdomen,  multiple senile keratoses, scar right knee, scar left knee [de-identified] : s/p left patellar fracture,  s/p right patellar fracture, s/p left fifth finger fracture, s/p right elbow fracture,  s/p left wrist fracture

## 2021-04-12 NOTE — REVIEW OF SYSTEMS
[Fatigue] : fatigue [Diarrhea] : diarrhea [Negative] : Allergic/Immunologic [Fever] : no fever [Chills] : no chills [Night Sweats] : no night sweats [Recent Change In Weight] : ~T no recent weight change [Abdominal Pain] : no abdominal pain [Vomiting] : no vomiting [Constipation] : no constipation [FreeTextEntry7] : no blood in stool, has two to three BMs per day, occasionally loose [FreeTextEntry8] : no blood in urine

## 2021-04-12 NOTE — HISTORY OF PRESENT ILLNESS
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 Patient's last iron infusions were in May 2020. Most recent blood results from February 2021 revealed the hemoglobin was stable at 13.8 and hematocrit 43.0, with the rest of the CBC was normal. The ferritin was stable at 91 with an iron saturation of 19%. The B12 was 1134 and the folate was 12.5. Patient complains of fatigue and occasional diarrhea, but states she is generally feeling well. Denies abdominal pain, constipation, blood in urine or stool, recent infection, fever, chills, or sweats.

## 2021-04-12 NOTE — ASSESSMENT
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 Patient is s/p two Feraheme iron infusions in May 2020, with noted improvement. As blood loss from the patient's  gastrointestinal tract can be sporadic, plan is to keep ahead by replacing iron by infusion when needed. Have ordered CBC, CMP, iron panel, ferritin, reticulocyte count. Recommendations pending the results. Patient will call for results and next appointment date.

## 2021-04-12 NOTE — ADDENDUM
[FreeTextEntry1] : I, Roxie Aviles, acted solely as a scribe for Dr. Argentina Jones on 04/12/2021.

## 2021-04-12 NOTE — REASON FOR VISIT
[Follow-Up Visit] : a follow-up visit for [FreeTextEntry2] : Anemia of chronic blood loss, iron deficiency anemia, fatigue

## 2021-04-13 LAB
ALBUMIN SERPL ELPH-MCNC: 4.4 G/DL
ALP BLD-CCNC: 102 U/L
ALT SERPL-CCNC: 70 U/L
ANION GAP SERPL CALC-SCNC: 12 MMOL/L
AST SERPL-CCNC: 62 U/L
BASOPHILS # BLD AUTO: 0 K/UL
BASOPHILS # BLD AUTO: 0 K/UL
BASOPHILS NFR BLD AUTO: 0 %
BASOPHILS NFR BLD AUTO: 0 %
BILIRUB SERPL-MCNC: 0.2 MG/DL
BUN SERPL-MCNC: 15 MG/DL
CALCIUM SERPL-MCNC: 10.2 MG/DL
CHLORIDE SERPL-SCNC: 103 MMOL/L
CO2 SERPL-SCNC: 26 MMOL/L
CREAT SERPL-MCNC: 0.59 MG/DL
EOSINOPHIL # BLD AUTO: 0.24 K/UL
EOSINOPHIL # BLD AUTO: 0.24 K/UL
EOSINOPHIL NFR BLD AUTO: 2.6 %
EOSINOPHIL NFR BLD AUTO: 2.6 %
FERRITIN SERPL-MCNC: 81 NG/ML
GLUCOSE SERPL-MCNC: 82 MG/DL
HCT VFR BLD CALC: 43.6 %
HGB BLD-MCNC: 13.9 G/DL
IRON SATN MFR SERPL: 15 %
IRON SERPL-MCNC: 57 UG/DL
LYMPHOCYTES # BLD AUTO: 1.36 K/UL
LYMPHOCYTES # BLD AUTO: 1.36 K/UL
LYMPHOCYTES NFR BLD AUTO: 14.8 %
LYMPHOCYTES NFR BLD AUTO: 14.8 %
MAN DIFF?: NORMAL
MCHC RBC-ENTMCNC: 29.1 PG
MCHC RBC-ENTMCNC: 31.9 GM/DL
MCV RBC AUTO: 91.2 FL
METAMYELOCYTES # FLD: 0.9 %
MONOCYTES # BLD AUTO: 0.32 K/UL
MONOCYTES # BLD AUTO: 0.32 K/UL
MONOCYTES NFR BLD AUTO: 3.5 %
MONOCYTES NFR BLD AUTO: 3.5 %
MSMEAR: NORMAL
NEUTROPHILS # BLD AUTO: 7.18 K/UL
NEUTROPHILS # BLD AUTO: 7.18 K/UL
NEUTROPHILS NFR BLD AUTO: 77.4 %
NEUTROPHILS NFR BLD AUTO: 77.4 %
NEUTS BAND NFR BLD MANUAL: 0.8 %
OVALOCYTES BLD QL SMEAR: SLIGHT
PLAT MORPH BLD: ABNORMAL
PLATELET # BLD AUTO: 275 K/UL
POIKILOCYTOSIS BLD QL SMEAR: SLIGHT
POLYCHROMASIA BLD QL SMEAR: SLIGHT
POTASSIUM SERPL-SCNC: 4.5 MMOL/L
PROT SERPL-MCNC: 7 G/DL
RBC # BLD: 4.78 M/UL
RBC # FLD: 14.5 %
RBC BLD AUTO: ABNORMAL
SCHISTOCYTES BLD QL SMEAR: SLIGHT
SMUDGE CELLS # BLD: PRESENT
SODIUM SERPL-SCNC: 141 MMOL/L
TIBC SERPL-MCNC: 383 UG/DL
UIBC SERPL-MCNC: 326 UG/DL
WBC # FLD AUTO: 9.18 K/UL

## 2021-04-14 ENCOUNTER — NON-APPOINTMENT (OUTPATIENT)
Age: 86
End: 2021-04-14

## 2021-06-10 ENCOUNTER — APPOINTMENT (OUTPATIENT)
Dept: HEMATOLOGY ONCOLOGY | Facility: CLINIC | Age: 86
End: 2021-06-10
Payer: MEDICARE

## 2021-06-10 VITALS
SYSTOLIC BLOOD PRESSURE: 128 MMHG | TEMPERATURE: 97.9 F | WEIGHT: 96 LBS | BODY MASS INDEX: 17.66 KG/M2 | HEART RATE: 81 BPM | HEIGHT: 62 IN | DIASTOLIC BLOOD PRESSURE: 80 MMHG | OXYGEN SATURATION: 96 %

## 2021-06-10 LAB
RBC # BLD: 4.28 M/UL
RETICS # AUTO: 1.6 %
RETICS AGGREG/RBC NFR: 70.2 K/UL

## 2021-06-10 PROCEDURE — 99213 OFFICE O/P EST LOW 20 MIN: CPT | Mod: 25

## 2021-06-10 PROCEDURE — 36415 COLL VENOUS BLD VENIPUNCTURE: CPT

## 2021-06-10 NOTE — ADDENDUM
[FreeTextEntry1] : I, Roxie Aviles, acted solely as a scribe for Dr. Argentina Jones on 06/10/2021.

## 2021-06-10 NOTE — PHYSICAL EXAM
[Thin] : thin [Normal] : affect appropriate [de-identified] : RRR, S1, S2, ?murmur, no rubs or gallops [de-identified] : multiple surgical scars [de-identified] : s/p left patellar fracture,  s/p right patellar fracture, s/p left fifth finger fracture, s/p right elbow fracture,  s/p left wrist fracture , left hand splinted. [de-identified] : multiple scars on abdomen,  multiple senile keratoses, scar right knee, scar left knee

## 2021-06-10 NOTE — ASSESSMENT
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 Patient is s/p two Feraheme iron infusions in May 2020, with noted improvement. As blood loss from the patient's gastrointestinal tract can be sporadic, plan is to keep ahead by replacing iron by infusion when needed. Have ordered B12 and Folate, CBC, CMP, ferritin, iron panel, reticulocyte count, sed rate, and TSH. Recommendations pending the results. Patient will call for results and next appointment date.

## 2021-06-10 NOTE — END OF VISIT
[FreeTextEntry3] : All medical record entries made by the Scribe were at my, Dr. Argentina Jones, direction and personally dictated by me on 06/10/2021. I have reviewed the chart and agree that the record accurately reflects my personal performance of the history, physical exam, assessment and plan. I have also personally directed, reviewed, and agreed with the chart.

## 2021-06-10 NOTE — HISTORY OF PRESENT ILLNESS
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 Patient's last iron infusions were in May 2020. Most recent blood results from April 2021 revealed the hemoglobin was stable at 13.9 with hematocrit 43.6, and the rest of the CBC normal. The ferritin was 81 and the iron panel was normal. The CMP was significant for slightly elevated AST at 62 and ALT at 70, with the remainder being normal. Since the last visit, patient fell and sustained a dislocated joint in her left hand, which is being treated by her orthopedist. Patient also complains of diarrhea. Denies hematochezia, recent infection, fever, chills, or sweats. \par

## 2021-06-14 LAB
ALBUMIN SERPL ELPH-MCNC: 4.3 G/DL
ALP BLD-CCNC: 102 U/L
ALT SERPL-CCNC: 36 U/L
ANION GAP SERPL CALC-SCNC: 13 MMOL/L
AST SERPL-CCNC: 36 U/L
BASOPHILS # BLD AUTO: 0 K/UL
BASOPHILS # BLD AUTO: 0 K/UL
BASOPHILS NFR BLD AUTO: 0 %
BASOPHILS NFR BLD AUTO: 0 %
BILIRUB SERPL-MCNC: 0.2 MG/DL
BUN SERPL-MCNC: 17 MG/DL
CALCIUM SERPL-MCNC: 9.8 MG/DL
CHLORIDE SERPL-SCNC: 105 MMOL/L
CO2 SERPL-SCNC: 21 MMOL/L
CREAT SERPL-MCNC: 0.65 MG/DL
EOSINOPHIL # BLD AUTO: 0.15 K/UL
EOSINOPHIL # BLD AUTO: 0.15 K/UL
EOSINOPHIL NFR BLD AUTO: 1.8 %
EOSINOPHIL NFR BLD AUTO: 1.8 %
ERYTHROCYTE [SEDIMENTATION RATE] IN BLOOD BY WESTERGREN METHOD: 41 MM/HR
FERRITIN SERPL-MCNC: 12 NG/ML
FOLATE SERPL-MCNC: 13.7 NG/ML
GIANT PLATELETS BLD QL SMEAR: PRESENT
GLUCOSE SERPL-MCNC: 81 MG/DL
HCT VFR BLD CALC: 38.6 %
HGB BLD-MCNC: 12 G/DL
HYPOCHROMIA BLD QL SMEAR: SLIGHT
IRON SATN MFR SERPL: 7 %
IRON SERPL-MCNC: 34 UG/DL
LYMPHOCYTES # BLD AUTO: 0.89 K/UL
LYMPHOCYTES # BLD AUTO: 0.89 K/UL
LYMPHOCYTES NFR BLD AUTO: 10.4 %
LYMPHOCYTES NFR BLD AUTO: 10.4 %
MAN DIFF?: NORMAL
MCHC RBC-ENTMCNC: 28 PG
MCHC RBC-ENTMCNC: 31.1 GM/DL
MCV RBC AUTO: 90.2 FL
MONOCYTES # BLD AUTO: 0.22 K/UL
MONOCYTES # BLD AUTO: 0.22 K/UL
MONOCYTES NFR BLD AUTO: 2.6 %
MONOCYTES NFR BLD AUTO: 2.6 %
MSMEAR: NORMAL
NEUTROPHILS # BLD AUTO: 7.3 K/UL
NEUTROPHILS # BLD AUTO: 7.3 K/UL
NEUTROPHILS NFR BLD AUTO: 85.2 %
NEUTROPHILS NFR BLD AUTO: 85.2 %
OVALOCYTES BLD QL SMEAR: SLIGHT
PLAT MORPH BLD: ABNORMAL
PLATELET # BLD AUTO: 281 K/UL
POIKILOCYTOSIS BLD QL SMEAR: SLIGHT
POLYCHROMASIA BLD QL SMEAR: SLIGHT
POTASSIUM SERPL-SCNC: 4.2 MMOL/L
PROT SERPL-MCNC: 6.9 G/DL
RBC # BLD: 4.28 M/UL
RBC # FLD: 14 %
RBC BLD AUTO: ABNORMAL
SCHISTOCYTES BLD QL SMEAR: SLIGHT
SMUDGE CELLS # BLD: PRESENT
SODIUM SERPL-SCNC: 139 MMOL/L
TIBC SERPL-MCNC: 465 UG/DL
TSH SERPL-ACNC: 2.73 UIU/ML
UIBC SERPL-MCNC: 431 UG/DL
VIT B12 SERPL-MCNC: 1229 PG/ML
WBC # FLD AUTO: 8.57 K/UL

## 2021-06-16 ENCOUNTER — NON-APPOINTMENT (OUTPATIENT)
Age: 86
End: 2021-06-16

## 2021-07-07 ENCOUNTER — APPOINTMENT (OUTPATIENT)
Dept: HEMATOLOGY ONCOLOGY | Facility: CLINIC | Age: 86
End: 2021-07-07
Payer: MEDICARE

## 2021-07-07 VITALS
BODY MASS INDEX: 18.03 KG/M2 | HEIGHT: 62 IN | DIASTOLIC BLOOD PRESSURE: 76 MMHG | OXYGEN SATURATION: 98 % | SYSTOLIC BLOOD PRESSURE: 122 MMHG | WEIGHT: 98 LBS | TEMPERATURE: 96.7 F | HEART RATE: 60 BPM

## 2021-07-07 LAB
RBC # BLD: 4.54 M/UL
RETICS # AUTO: 1.2 %
RETICS AGGREG/RBC NFR: 54.9 K/UL

## 2021-07-07 PROCEDURE — 99213 OFFICE O/P EST LOW 20 MIN: CPT | Mod: 25

## 2021-07-07 PROCEDURE — 36415 COLL VENOUS BLD VENIPUNCTURE: CPT

## 2021-07-07 NOTE — PHYSICAL EXAM
[Thin] : thin [Normal] : affect appropriate [de-identified] : multiple surgical scars [de-identified] : RRR, S1, S2, ?murmur, no rubs or gallops [de-identified] : s/p left patellar fracture,  s/p right patellar fracture, s/p left fifth finger fracture, s/p right elbow fracture,  s/p left wrist fracture [de-identified] : multiple scars on abdomen,  multiple senile keratoses, scar right knee, scar left knee

## 2021-07-07 NOTE — END OF VISIT
[FreeTextEntry3] : All medical record entries made by the Scribe were at my, Dr. Argentina Jones, direction and personally dictated by me on 07/07/2021. I have reviewed the chart and agree that the record accurately reflects my personal performance of the history, physical exam, assessment and plan. I have also personally directed, reviewed, and agreed with the chart.

## 2021-07-07 NOTE — REVIEW OF SYSTEMS
[Fatigue] : fatigue [Diarrhea] : diarrhea [Negative] : Allergic/Immunologic [Chills] : no chills [Night Sweats] : no night sweats [Recent Change In Weight] : ~T no recent weight change [Abdominal Pain] : no abdominal pain [FreeTextEntry7] : one episode of BRBPR [de-identified] : Feels stressed with family situations

## 2021-07-07 NOTE — ASSESSMENT
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 Patient is s/p two Feraheme iron infusions in May 2020, with noted improvement. As blood loss from the patient's gastrointestinal tract can be sporadic and recent iron stores have diminished, plan is to keep ahead by replacing iron by infusion when needed. Have ordered CBC, CMP, ferritin, iron panel, and reticulocyte count. Iron infusion to be determined pending results. Patient was advised to call office to discuss results and next appointment date.

## 2021-07-07 NOTE — ADDENDUM
[FreeTextEntry1] : I, Roxie Aviles, acted solely as a scribe for Dr. Argentina Jones on 07/07/2021.

## 2021-07-07 NOTE — HISTORY OF PRESENT ILLNESS
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 Patient's last iron infusions were in May 2020. Most recent blood results from June 2021 revealed the hemoglobin was 12.0 with a normal white blood count and platelet count. Percent neutrophils in the differential was slightly elevated. Sedimentation rate was 41, and TSH was normal at 2.73. Comprehensive metabolic panel was normal. Patient's ferritin was low at 12, and iron saturation was low at 7% with TIBC elevated to 465. B12 and Folate were normal. Since the last visit, patient experienced one incident of slight rectal bleeding after a heavy bowel movement. She continues to have intermittent diarrhea. Patient complains of feeling stressed and fatigued because of the current pandemic and family issues. Denies  change in appetite, fever, sweats, chills, or recent infections.

## 2021-07-07 NOTE — REASON FOR VISIT
[Follow-Up Visit] : a follow-up visit for [FreeTextEntry2] : Iron deficiency anemia, anemia due to blood loss, fatigue

## 2021-07-08 ENCOUNTER — NON-APPOINTMENT (OUTPATIENT)
Age: 86
End: 2021-07-08

## 2021-07-08 LAB
ALBUMIN SERPL ELPH-MCNC: 4.1 G/DL
ALP BLD-CCNC: 95 U/L
ALT SERPL-CCNC: 43 U/L
ANION GAP SERPL CALC-SCNC: 11 MMOL/L
AST SERPL-CCNC: 37 U/L
BASOPHILS # BLD AUTO: 0.15 K/UL
BASOPHILS # BLD AUTO: 0.15 K/UL
BASOPHILS NFR BLD AUTO: 1.8 %
BASOPHILS NFR BLD AUTO: 1.8 %
BILIRUB SERPL-MCNC: 0.2 MG/DL
BUN SERPL-MCNC: 17 MG/DL
CALCIUM SERPL-MCNC: 9.5 MG/DL
CHLORIDE SERPL-SCNC: 109 MMOL/L
CO2 SERPL-SCNC: 22 MMOL/L
CREAT SERPL-MCNC: 0.73 MG/DL
EOSINOPHIL # BLD AUTO: 0.22 K/UL
EOSINOPHIL # BLD AUTO: 0.22 K/UL
EOSINOPHIL NFR BLD AUTO: 2.7 %
EOSINOPHIL NFR BLD AUTO: 2.7 %
FERRITIN SERPL-MCNC: 12 NG/ML
GIANT PLATELETS BLD QL SMEAR: PRESENT
GLUCOSE SERPL-MCNC: 113 MG/DL
HCT VFR BLD CALC: 39.9 %
HGB BLD-MCNC: 12 G/DL
HYPOCHROMIA BLD QL SMEAR: SLIGHT
IRON SATN MFR SERPL: 11 %
IRON SERPL-MCNC: 49 UG/DL
LYMPHOCYTES # BLD AUTO: 0.98 K/UL
LYMPHOCYTES # BLD AUTO: 0.98 K/UL
LYMPHOCYTES NFR BLD AUTO: 11.8 %
LYMPHOCYTES NFR BLD AUTO: 11.8 %
MAN DIFF?: NORMAL
MCHC RBC-ENTMCNC: 26.4 PG
MCHC RBC-ENTMCNC: 30.1 GM/DL
MCV RBC AUTO: 87.9 FL
MONOCYTES # BLD AUTO: 0.6 K/UL
MONOCYTES # BLD AUTO: 0.6 K/UL
MONOCYTES NFR BLD AUTO: 7.3 %
MONOCYTES NFR BLD AUTO: 7.3 %
MSMEAR: NORMAL
NEUTROPHILS # BLD AUTO: 6.32 K/UL
NEUTROPHILS # BLD AUTO: 6.32 K/UL
NEUTROPHILS NFR BLD AUTO: 76.4 %
NEUTROPHILS NFR BLD AUTO: 76.4 %
OVALOCYTES BLD QL SMEAR: SLIGHT
PLAT MORPH BLD: ABNORMAL
PLATELET # BLD AUTO: 292 K/UL
POLYCHROMASIA BLD QL SMEAR: SLIGHT
POTASSIUM SERPL-SCNC: 4.3 MMOL/L
PROT SERPL-MCNC: 6.7 G/DL
RBC # BLD: 4.54 M/UL
RBC # FLD: 15.2 %
RBC BLD AUTO: ABNORMAL
SMUDGE CELLS # BLD: PRESENT
SODIUM SERPL-SCNC: 143 MMOL/L
TIBC SERPL-MCNC: 430 UG/DL
UIBC SERPL-MCNC: 381 UG/DL
WBC # FLD AUTO: 8.27 K/UL

## 2021-08-04 ENCOUNTER — APPOINTMENT (OUTPATIENT)
Dept: HEMATOLOGY ONCOLOGY | Facility: CLINIC | Age: 86
End: 2021-08-04
Payer: MEDICARE

## 2021-08-04 VITALS
DIASTOLIC BLOOD PRESSURE: 70 MMHG | TEMPERATURE: 96.4 F | HEART RATE: 80 BPM | OXYGEN SATURATION: 96 % | WEIGHT: 96 LBS | HEIGHT: 62 IN | SYSTOLIC BLOOD PRESSURE: 112 MMHG | BODY MASS INDEX: 17.66 KG/M2

## 2021-08-04 LAB
RBC # BLD: 4.81 M/UL
RETICS # AUTO: 1.1 %
RETICS AGGREG/RBC NFR: 54.8 K/UL

## 2021-08-04 PROCEDURE — 36415 COLL VENOUS BLD VENIPUNCTURE: CPT

## 2021-08-04 PROCEDURE — 99213 OFFICE O/P EST LOW 20 MIN: CPT | Mod: 25

## 2021-08-04 NOTE — END OF VISIT
[FreeTextEntry3] : All medical record entries made by the Scribe were at my, Dr. Argentina Jones, direction and personally dictated by me on 08/04/2021. I have reviewed the chart and agree that the record accurately reflects my personal performance of the history, physical exam, assessment and plan. I have also personally directed, reviewed, and agreed with the chart.

## 2021-08-04 NOTE — HISTORY OF PRESENT ILLNESS
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 Patient's last iron infusions were in May 2020. Most recent blood results from July 2021 revealed the hemoglobin was stable at 12.0, hematocrit 39.9, with a normal white blood count and platelet count. Comprehensive metabolic panel was significant for a glucose of 113. Iron stores remained low with a ferritin of 12 and an iron saturation of 11%. Patient complains of trouble sleeping due to stress, and of continued intermittent diarrhea, but otherwise feels generally well at this time, with no acute complaints.  Denies any bleeding, fever, sweats, chills, or recent infections. \par

## 2021-08-04 NOTE — PHYSICAL EXAM
[Thin] : thin [Normal] : affect appropriate [de-identified] : RRR, S1, S2, ?murmur, no rubs or gallops, occasional ectopic [de-identified] : multiple surgical scars [de-identified] : s/p left patellar fracture,  s/p right patellar fracture, s/p left fifth finger fracture, s/p right elbow fracture,  s/p left wrist fracture, all well healed [de-identified] : multiple scars on abdomen,  multiple senile keratoses, scar right knee, scar left knee

## 2021-08-04 NOTE — ADDENDUM
[FreeTextEntry1] : I, Taina Smalls, acted solely as a scribe for Dr. Argentina Jones on 08/04/2021.

## 2021-08-04 NOTE — REVIEW OF SYSTEMS
[Fatigue] : fatigue [Negative] : Allergic/Immunologic [Fever] : no fever [Chills] : no chills [Night Sweats] : no night sweats [Recent Change In Weight] : ~T no recent weight change [Abdominal Pain] : no abdominal pain [Vomiting] : no vomiting [Constipation] : no constipation [Diarrhea] : no diarrhea [FreeTextEntry2] : Sleeps poorly [FreeTextEntry7] : No blood in stool, takes Imodium to prevent diarrhea

## 2021-08-04 NOTE — REASON FOR VISIT
[Follow-Up Visit] : a follow-up visit for [FreeTextEntry2] : iron deficiency anemia, anemia due to chronic blood loss,

## 2021-08-04 NOTE — ASSESSMENT
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 Patient is s/p two Feraheme iron infusions in May 2020, with noted improvement. As blood loss from the patient's gastrointestinal tract can be sporadic and recent iron stores have diminished, plan is to keep ahead by replacing iron by infusion when needed. Have suggested that patient consider starting "gentle" low-dose iron twice per week, as she has not tolerated oral iron in the past (she does not recall what type). Have ordered CBC, CMP, ferritin, iron panel, and reticulocyte count. Iron infusion to be determined pending results. Patient was advised to call office to discuss results and next appointment date.

## 2021-08-05 ENCOUNTER — NON-APPOINTMENT (OUTPATIENT)
Age: 86
End: 2021-08-05

## 2021-08-05 LAB
ALBUMIN SERPL ELPH-MCNC: 4.5 G/DL
ALP BLD-CCNC: 102 U/L
ALT SERPL-CCNC: 40 U/L
ANION GAP SERPL CALC-SCNC: 14 MMOL/L
AST SERPL-CCNC: 36 U/L
BASOPHILS # BLD AUTO: 0 K/UL
BASOPHILS # BLD AUTO: 0 K/UL
BASOPHILS NFR BLD AUTO: 0 %
BASOPHILS NFR BLD AUTO: 0 %
BILIRUB SERPL-MCNC: 0.2 MG/DL
BUN SERPL-MCNC: 18 MG/DL
BURR CELLS BLD QL SMEAR: PRESENT
CALCIUM SERPL-MCNC: 9.8 MG/DL
CHLORIDE SERPL-SCNC: 106 MMOL/L
CO2 SERPL-SCNC: 20 MMOL/L
CREAT SERPL-MCNC: 0.58 MG/DL
EOSINOPHIL # BLD AUTO: 0 K/UL
EOSINOPHIL # BLD AUTO: 0 K/UL
EOSINOPHIL NFR BLD AUTO: 0 %
EOSINOPHIL NFR BLD AUTO: 0 %
FERRITIN SERPL-MCNC: 12 NG/ML
GIANT PLATELETS BLD QL SMEAR: PRESENT
GLUCOSE SERPL-MCNC: 77 MG/DL
HCT VFR BLD CALC: 40.7 %
HGB BLD-MCNC: 12.5 G/DL
HYPOCHROMIA BLD QL SMEAR: SLIGHT
IRON SATN MFR SERPL: 11 %
IRON SERPL-MCNC: 52 UG/DL
LYMPHOCYTES # BLD AUTO: 0.67 K/UL
LYMPHOCYTES # BLD AUTO: 0.67 K/UL
LYMPHOCYTES NFR BLD AUTO: 7.1 %
LYMPHOCYTES NFR BLD AUTO: 7.1 %
MAN DIFF?: NORMAL
MCHC RBC-ENTMCNC: 26 PG
MCHC RBC-ENTMCNC: 30.7 GM/DL
MCV RBC AUTO: 84.6 FL
MONOCYTES # BLD AUTO: 0.92 K/UL
MONOCYTES # BLD AUTO: 0.92 K/UL
MONOCYTES NFR BLD AUTO: 9.7 %
MONOCYTES NFR BLD AUTO: 9.7 %
MSMEAR: NORMAL
MYELOCYTES NFR BLD: 0.9 %
NEUTROPHILS # BLD AUTO: 7.78 K/UL
NEUTROPHILS # BLD AUTO: 7.78 K/UL
NEUTROPHILS NFR BLD AUTO: 82.3 %
NEUTROPHILS NFR BLD AUTO: 82.3 %
OVALOCYTES BLD QL SMEAR: SLIGHT
PLAT MORPH BLD: ABNORMAL
PLATELET # BLD AUTO: 293 K/UL
POIKILOCYTOSIS BLD QL SMEAR: SLIGHT
POLYCHROMASIA BLD QL SMEAR: SLIGHT
POTASSIUM SERPL-SCNC: 4.9 MMOL/L
PROT SERPL-MCNC: 7.1 G/DL
RBC # BLD: 4.81 M/UL
RBC # FLD: 16.8 %
RBC BLD AUTO: ABNORMAL
SCHISTOCYTES BLD QL SMEAR: SLIGHT
SODIUM SERPL-SCNC: 141 MMOL/L
TIBC SERPL-MCNC: 485 UG/DL
UIBC SERPL-MCNC: 432 UG/DL
WBC # FLD AUTO: 9.45 K/UL

## 2021-09-09 ENCOUNTER — APPOINTMENT (OUTPATIENT)
Dept: HEMATOLOGY ONCOLOGY | Facility: CLINIC | Age: 86
End: 2021-09-09
Payer: MEDICARE

## 2021-09-09 VITALS
OXYGEN SATURATION: 98 % | WEIGHT: 96 LBS | TEMPERATURE: 96.9 F | BODY MASS INDEX: 17.66 KG/M2 | DIASTOLIC BLOOD PRESSURE: 68 MMHG | HEART RATE: 83 BPM | HEIGHT: 62 IN | SYSTOLIC BLOOD PRESSURE: 120 MMHG

## 2021-09-09 PROCEDURE — 99213 OFFICE O/P EST LOW 20 MIN: CPT | Mod: 25

## 2021-09-09 PROCEDURE — 36415 COLL VENOUS BLD VENIPUNCTURE: CPT

## 2021-09-09 NOTE — PHYSICAL EXAM
[Thin] : thin [Normal] : affect appropriate [de-identified] : RRR, S1, S2, ?murmur, no rubs or gallops, occasional ectopic [de-identified] : multiple surgical scars [de-identified] : s/p left patellar fracture,  s/p right patellar fracture, s/p left fifth finger fracture, s/p right elbow fracture,  s/p left wrist fracture, all well healed [de-identified] : multiple scars on abdomen,  multiple senile keratoses, scar right knee, scar left knee

## 2021-09-09 NOTE — REVIEW OF SYSTEMS
[Fatigue] : fatigue [Diarrhea] : diarrhea [Negative] : Allergic/Immunologic [Fever] : no fever [Chills] : no chills [Night Sweats] : no night sweats [Recent Change In Weight] : ~T no recent weight change [Easy Bleeding] : no tendency for easy bleeding [FreeTextEntry7] : Intermittent diarrhea controlled with Imodium  [de-identified] : Felt "out of sorts" while in [employer]

## 2021-09-09 NOTE — HISTORY OF PRESENT ILLNESS
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 Patient's last iron infusions were in May 2020. Most recent blood results from August 2021 revealed the hemoglobin was stable at 12.5 and hematocrit was 40.7 The comprehensive metabolic panel was normal. The iron saturation was low at 11%, the TIBC was high at 485, and the ferritin was low at 12. Patient started taking gentle iron supplements last month, but had to stop because she tolerated them poorly. Today, patient complains of chronic diarrhea which she treats with Imodium, but otherwise feels well with no acute complaints. She states that 2 days ago, she had a brief bout of dizziness/disorientation after standing up and sitting down in her [employer]. Denies any bleeding, fever, sweats, chills, or recent infections. Of note, patient received the Pfizer COVID-19 booster vaccine on August 30, which was well tolerated.

## 2021-09-09 NOTE — ASSESSMENT
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 Patient is s/p two Feraheme iron infusions in May 2020, with noted improvement. As blood loss from the patient's gastrointestinal tract can be sporadic and recent iron stores have diminished, plan is to keep ahead by replacing iron by infusion when needed. She does not tolerate oral iron. Have ordered CBC, CMP, Ferritin, iron panel, reticulocyte, and vitamin D tests. Patient was advised to call office to discuss results and next appointment date. \par

## 2021-09-09 NOTE — REASON FOR VISIT
[Follow-Up Visit] : a follow-up visit for [FreeTextEntry2] : Iron deficiency anemia, anemia of chronic blood loss, fatigue, Vitamin D deficiency

## 2021-09-09 NOTE — END OF VISIT
[FreeTextEntry3] : All medical record entries made by the Scribe were at my, Dr. Argentina Jones, direction and personally dictated by me on 09/09/2021. I have reviewed the chart and agree that the record accurately reflects my personal performance of the history, physical exam, assessment and plan. I have also personally directed, reviewed, and agreed with the chart.

## 2021-09-10 LAB
25(OH)D3 SERPL-MCNC: 37.1 NG/ML
ACANTHOCYTES BLD QL SMEAR: SLIGHT
ALBUMIN SERPL ELPH-MCNC: 4 G/DL
ALP BLD-CCNC: 97 U/L
ALT SERPL-CCNC: 33 U/L
ANION GAP SERPL CALC-SCNC: 13 MMOL/L
AST SERPL-CCNC: 27 U/L
BASOPHILS # BLD AUTO: 0 K/UL
BASOPHILS # BLD AUTO: 0 K/UL
BASOPHILS NFR BLD AUTO: 0 %
BASOPHILS NFR BLD AUTO: 0 %
BILIRUB SERPL-MCNC: 0.2 MG/DL
BUN SERPL-MCNC: 15 MG/DL
CALCIUM SERPL-MCNC: 9.5 MG/DL
CHLORIDE SERPL-SCNC: 107 MMOL/L
CO2 SERPL-SCNC: 21 MMOL/L
CREAT SERPL-MCNC: 0.72 MG/DL
EOSINOPHIL # BLD AUTO: 0.2 K/UL
EOSINOPHIL # BLD AUTO: 0.2 K/UL
EOSINOPHIL NFR BLD AUTO: 2.5 %
EOSINOPHIL NFR BLD AUTO: 2.5 %
FERRITIN SERPL-MCNC: 10 NG/ML
GLUCOSE SERPL-MCNC: 99 MG/DL
HCT VFR BLD CALC: 34.9 %
HGB BLD-MCNC: 10.4 G/DL
HYPOCHROMIA BLD QL SMEAR: SLIGHT
IRON SATN MFR SERPL: 5 %
IRON SERPL-MCNC: 21 UG/DL
LYMPHOCYTES # BLD AUTO: 1.01 K/UL
LYMPHOCYTES # BLD AUTO: 1.01 K/UL
LYMPHOCYTES NFR BLD AUTO: 12.7 %
LYMPHOCYTES NFR BLD AUTO: 12.7 %
MAN DIFF?: NORMAL
MCHC RBC-ENTMCNC: 25 PG
MCHC RBC-ENTMCNC: 29.8 GM/DL
MCV RBC AUTO: 83.9 FL
MONOCYTES # BLD AUTO: 0.27 K/UL
MONOCYTES # BLD AUTO: 0.27 K/UL
MONOCYTES NFR BLD AUTO: 3.4 %
MONOCYTES NFR BLD AUTO: 3.4 %
MSMEAR: NORMAL
NEUTROPHILS # BLD AUTO: 6.5 K/UL
NEUTROPHILS # BLD AUTO: 6.5 K/UL
NEUTROPHILS NFR BLD AUTO: 81.4 %
NEUTROPHILS NFR BLD AUTO: 81.4 %
OVALOCYTES BLD QL SMEAR: SLIGHT
PLAT MORPH BLD: ABNORMAL
PLATELET # BLD AUTO: 329 K/UL
POIKILOCYTOSIS BLD QL SMEAR: SLIGHT
POLYCHROMASIA BLD QL SMEAR: SLIGHT
POTASSIUM SERPL-SCNC: 4.1 MMOL/L
PROT SERPL-MCNC: 6.7 G/DL
RBC # BLD: 4.16 M/UL
RBC # BLD: 4.16 M/UL
RBC # FLD: 17.7 %
RBC BLD AUTO: ABNORMAL
RETICS # AUTO: 1.6 %
RETICS AGGREG/RBC NFR: 65.7 K/UL
SCHISTOCYTES BLD QL SMEAR: SLIGHT
SODIUM SERPL-SCNC: 142 MMOL/L
SPHEROCYTES BLD QL SMEAR: SLIGHT
TIBC SERPL-MCNC: 442 UG/DL
UIBC SERPL-MCNC: 421 UG/DL
WBC # FLD AUTO: 7.98 K/UL

## 2021-09-13 ENCOUNTER — NON-APPOINTMENT (OUTPATIENT)
Age: 86
End: 2021-09-13

## 2021-09-14 DIAGNOSIS — R19.7 DIARRHEA, UNSPECIFIED: ICD-10-CM

## 2021-09-20 ENCOUNTER — OUTPATIENT (OUTPATIENT)
Dept: OUTPATIENT SERVICES | Facility: HOSPITAL | Age: 86
LOS: 1 days | End: 2021-09-20
Payer: MEDICARE

## 2021-09-20 ENCOUNTER — APPOINTMENT (OUTPATIENT)
Age: 86
End: 2021-09-20

## 2021-09-20 VITALS
WEIGHT: 95.9 LBS | RESPIRATION RATE: 18 BRPM | TEMPERATURE: 97 F | DIASTOLIC BLOOD PRESSURE: 77 MMHG | HEART RATE: 73 BPM | OXYGEN SATURATION: 100 % | HEIGHT: 62 IN | SYSTOLIC BLOOD PRESSURE: 119 MMHG

## 2021-09-20 DIAGNOSIS — D50.9 IRON DEFICIENCY ANEMIA, UNSPECIFIED: ICD-10-CM

## 2021-09-20 PROCEDURE — 96365 THER/PROPH/DIAG IV INF INIT: CPT

## 2021-09-20 RX ORDER — FERUMOXYTOL 510 MG/17ML
510 INJECTION INTRAVENOUS ONCE
Refills: 0 | Status: COMPLETED | OUTPATIENT
Start: 2021-09-20 | End: 2021-09-20

## 2021-09-20 RX ADMIN — FERUMOXYTOL 117 MILLIGRAM(S): 510 INJECTION INTRAVENOUS at 09:30

## 2021-09-27 ENCOUNTER — OUTPATIENT (OUTPATIENT)
Dept: OUTPATIENT SERVICES | Facility: HOSPITAL | Age: 86
LOS: 1 days | End: 2021-09-27
Payer: MEDICARE

## 2021-09-27 ENCOUNTER — APPOINTMENT (OUTPATIENT)
Age: 86
End: 2021-09-27

## 2021-09-27 VITALS
OXYGEN SATURATION: 99 % | DIASTOLIC BLOOD PRESSURE: 72 MMHG | RESPIRATION RATE: 15 BRPM | TEMPERATURE: 98 F | SYSTOLIC BLOOD PRESSURE: 114 MMHG | HEART RATE: 72 BPM

## 2021-09-27 DIAGNOSIS — D50.9 IRON DEFICIENCY ANEMIA, UNSPECIFIED: ICD-10-CM

## 2021-09-27 PROCEDURE — 96365 THER/PROPH/DIAG IV INF INIT: CPT

## 2021-09-27 RX ORDER — FERUMOXYTOL 510 MG/17ML
510 INJECTION INTRAVENOUS ONCE
Refills: 0 | Status: COMPLETED | OUTPATIENT
Start: 2021-09-27 | End: 2021-09-27

## 2021-09-27 RX ADMIN — FERUMOXYTOL 510 MILLIGRAM(S): 510 INJECTION INTRAVENOUS at 10:28

## 2021-09-27 RX ADMIN — FERUMOXYTOL 117 MILLIGRAM(S): 510 INJECTION INTRAVENOUS at 09:26

## 2021-10-20 ENCOUNTER — APPOINTMENT (OUTPATIENT)
Dept: HEMATOLOGY ONCOLOGY | Facility: CLINIC | Age: 86
End: 2021-10-20
Payer: MEDICARE

## 2021-10-20 VITALS
OXYGEN SATURATION: 97 % | HEIGHT: 62 IN | DIASTOLIC BLOOD PRESSURE: 62 MMHG | TEMPERATURE: 96.7 F | WEIGHT: 97 LBS | SYSTOLIC BLOOD PRESSURE: 110 MMHG | HEART RATE: 82 BPM | BODY MASS INDEX: 17.85 KG/M2

## 2021-10-20 LAB
RBC # BLD: 4.66 M/UL
RETICS # AUTO: 1.4 %
RETICS AGGREG/RBC NFR: 64.8 K/UL

## 2021-10-20 PROCEDURE — 36415 COLL VENOUS BLD VENIPUNCTURE: CPT

## 2021-10-20 PROCEDURE — 99213 OFFICE O/P EST LOW 20 MIN: CPT | Mod: 25

## 2021-10-20 NOTE — REASON FOR VISIT
[Follow-Up Visit] : a follow-up visit for [FreeTextEntry2] : Iron deficiency anemia, Iron deficiency anemia due to chronic blood loss, fatigue

## 2021-10-20 NOTE — END OF VISIT
[FreeTextEntry3] : All medical record entries made by the Scribe were at my, Dr. Argentina Jones, direction and personally dictated by me on 10/20/2021. I have reviewed the chart and agree that the record accurately reflects my personal performance of the history, physical exam, assessment and plan. I have also personally directed, reviewed, and agreed with the chart.

## 2021-10-20 NOTE — HISTORY OF PRESENT ILLNESS
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 Patient received iron by infusion (Feraheme) twice in September at [facility], with symptomatic improvement. Prior blood results from September 9 revealed the hemoglobin had dropped to 10.4 (was 12.5 a month prior) and hematocrit was 34.9. Ferritin was very low at 10 and iron saturation was low at 5%. The comprehensive metabolic panel was normal. Patient is unable to tolerate oral iron. Patient complained of fatigue and weakness before iron infusions, but today feels much better with less fatigue. She has started taking Lomotil for her chronic diarrhea. Denies any bleeding, fever, sweats, chills, or recent infections.

## 2021-10-20 NOTE — PHYSICAL EXAM
[Thin] : thin [Normal] : affect appropriate [de-identified] : RRR, S1, S2, ?murmur, no rubs or gallops, occasional ectopic [de-identified] : multiple surgical scars [de-identified] : s/p left patellar fracture,  s/p right patellar fracture, s/p left fifth finger fracture, s/p right elbow fracture,  s/p left wrist fracture, all well healed [de-identified] : multiple scars on abdomen,  multiple senile keratoses, scar right knee, scar left knee

## 2021-10-20 NOTE — REVIEW OF SYSTEMS
[Fatigue] : fatigue [Diarrhea] : diarrhea [Negative] : Allergic/Immunologic [Fever] : no fever [Chills] : no chills [Night Sweats] : no night sweats [Abdominal Pain] : no abdominal pain [Vomiting] : no vomiting [FreeTextEntry2] : much less fatigue [FreeTextEntry7] : Diarrhea now controlled with Lomotil

## 2021-10-20 NOTE — ASSESSMENT
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 Patient received two Feraheme infusions in September after her hemoglobin dropped from 12.5 to 10.4 in one month. Infusions were well tolerated with symptomatic improvement. As blood loss from the patient's gastrointestinal tract can be sporadic and recent iron stores have diminished, plan is to keep ahead by replacing iron by infusion when needed. She does not tolerate oral iron. Have ordered CBC, CMP, ferritin, iron panel, and reticulocyte count. Patient was advised to call office to discuss results and next appointment date.

## 2021-10-21 ENCOUNTER — NON-APPOINTMENT (OUTPATIENT)
Age: 86
End: 2021-10-21

## 2021-10-21 LAB
ALBUMIN SERPL ELPH-MCNC: 4.3 G/DL
ALP BLD-CCNC: 96 U/L
ALT SERPL-CCNC: 58 U/L
ANION GAP SERPL CALC-SCNC: 14 MMOL/L
AST SERPL-CCNC: 36 U/L
BASOPHILS NFR BLD AUTO: 1 %
BASOPHILS NFR BLD AUTO: 1 %
BILIRUB SERPL-MCNC: <0.2 MG/DL
BUN SERPL-MCNC: 18 MG/DL
CALCIUM SERPL-MCNC: 9.5 MG/DL
CHLORIDE SERPL-SCNC: 108 MMOL/L
CO2 SERPL-SCNC: 20 MMOL/L
CREAT SERPL-MCNC: 0.62 MG/DL
EOSINOPHIL NFR BLD AUTO: 1 %
EOSINOPHIL NFR BLD AUTO: 1 %
FERRITIN SERPL-MCNC: 531 NG/ML
GIANT PLATELETS BLD QL SMEAR: PRESENT
GLUCOSE SERPL-MCNC: 97 MG/DL
HCT VFR BLD CALC: 40.8 %
HGB BLD-MCNC: 12.9 G/DL
HYPOCHROMIA BLD QL SMEAR: SLIGHT
IRON SATN MFR SERPL: 35 %
IRON SERPL-MCNC: 113 UG/DL
LYMPHOCYTES # BLD AUTO: 0.95 K/UL
LYMPHOCYTES # BLD AUTO: 0.95 K/UL
LYMPHOCYTES NFR BLD AUTO: 13 %
LYMPHOCYTES NFR BLD AUTO: 13 %
MAN DIFF?: NORMAL
MCHC RBC-ENTMCNC: 27.7 PG
MCHC RBC-ENTMCNC: 31.6 GM/DL
MCV RBC AUTO: 87.6 FL
MONOCYTES NFR BLD AUTO: 4 %
MONOCYTES NFR BLD AUTO: 4 %
MSMEAR: NORMAL
NEUTROPHILS # BLD AUTO: 6.17 K/UL
NEUTROPHILS # BLD AUTO: 6.17 K/UL
NEUTROPHILS NFR BLD AUTO: 82 %
NEUTROPHILS NFR BLD AUTO: 82 %
PLAT MORPH BLD: ABNORMAL
PLATELET # BLD AUTO: 279 K/UL
POIKILOCYTOSIS BLD QL SMEAR: SLIGHT
POTASSIUM SERPL-SCNC: 4.6 MMOL/L
PROT SERPL-MCNC: 6.8 G/DL
RBC # BLD: 4.66 M/UL
RBC # FLD: 21.7 %
RBC BLD AUTO: NORMAL
SMUDGE CELLS # BLD: PRESENT
SODIUM SERPL-SCNC: 142 MMOL/L
SPHEROCYTES BLD QL SMEAR: SLIGHT
TIBC SERPL-MCNC: 321 UG/DL
UIBC SERPL-MCNC: 208 UG/DL
WBC # FLD AUTO: 7.53 K/UL

## 2021-11-17 ENCOUNTER — APPOINTMENT (OUTPATIENT)
Dept: HEMATOLOGY ONCOLOGY | Facility: CLINIC | Age: 86
End: 2021-11-17
Payer: MEDICARE

## 2021-11-17 VITALS
TEMPERATURE: 95.5 F | OXYGEN SATURATION: 97 % | SYSTOLIC BLOOD PRESSURE: 122 MMHG | WEIGHT: 97 LBS | BODY MASS INDEX: 17.85 KG/M2 | HEART RATE: 73 BPM | DIASTOLIC BLOOD PRESSURE: 70 MMHG | HEIGHT: 62 IN

## 2021-11-17 LAB
RBC # BLD: 4.95 M/UL
RETICS # AUTO: 1.2 %
RETICS AGGREG/RBC NFR: 58.4 K/UL

## 2021-11-17 PROCEDURE — 36415 COLL VENOUS BLD VENIPUNCTURE: CPT

## 2021-11-17 PROCEDURE — 99213 OFFICE O/P EST LOW 20 MIN: CPT | Mod: 25

## 2021-11-17 NOTE — REVIEW OF SYSTEMS
[Fatigue] : fatigue [Loss of Hearing] : loss of hearing [Diarrhea] : diarrhea [Negative] : Allergic/Immunologic [Fever] : no fever [Chills] : no chills [Night Sweats] : no night sweats [Recent Change In Weight] : ~T no recent weight change [Chest Pain] : no chest pain [Palpitations] : no palpitations [Shortness Of Breath] : no shortness of breath [Abdominal Pain] : no abdominal pain [Confused] : no confusion [Dizziness] : no dizziness [Fainting] : no fainting [Easy Bleeding] : no tendency for easy bleeding [Easy Bruising] : no tendency for easy bruising [FreeTextEntry7] : Diarrhea subsides with Lomotil [de-identified] : fell on her head two weeks ago, No LOC or dizziness

## 2021-11-17 NOTE — END OF VISIT
[FreeTextEntry3] : All medical record entries made by the Scribe were at my, Dr. Argentina Jones, direction and personally dictated by me on 11/17/2021. I have reviewed the chart and agree that the record accurately reflects my personal performance of the history, physical exam, assessment and plan. I have also personally directed, reviewed, and agreed with the chart.

## 2021-11-17 NOTE — ASSESSMENT
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 Patient received two Feraheme infusions in September. Infusions were well tolerated with symptomatic improvement, and hemoglobin improved from 10.4 to 12.9. As blood loss from the patient's gastrointestinal tract can be sporadic and  iron stores had diminished, plan is to keep ahead by replacing iron by infusion when needed. She does not tolerate oral iron. Have ordered CBC, CMP, ferritin, iron panel, reticulocyte count. Patient was advised to call office to discuss results and next appointment date.

## 2021-11-17 NOTE — HISTORY OF PRESENT ILLNESS
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 Patient received iron by infusion (Feraheme) twice in September at [facility], with symptomatic improvement. At her last visit in October, the hemoglobin was found to have improved to 12.9, and hematocrit was 40.8. The white blood count and platelet counts were normal. Comprehensive metabolic panel was normal except for an ALT of 58. The iron panel revealed a normal saturation of 35% and an elevated ferritin of 531, reflecting the recent iron infusions. Patient complains of some fatigue, but otherwise feels generally well at this time, with no acute complaints. Her chronic diarrhea is well managed with Lomotil. Two weeks ago, patient fell and sustained mild trauma to the head, but did not go to the ER. A pilar cyst on her scalp became mildly enlarged afterwards, but she did not lose consciousness and developed no other symptoms. Denies shortness of breath, hematochezia, fever, sweats, chills, or recent infections.

## 2021-11-17 NOTE — PHYSICAL EXAM
[Thin] : thin [Normal] : affect appropriate [de-identified] : RRR, S1, S2, ?murmur, no rubs or gallops, occasional ectopic [de-identified] : multiple surgical scars [de-identified] : s/p left patellar fracture,  s/p right patellar fracture, s/p left fifth finger fracture, s/p right elbow fracture,  s/p left wrist fracture, all well healed [de-identified] : multiple scars on abdomen,  multiple senile keratoses, scar right knee, scar left knee, soft tissue mass 2 cm on scalp

## 2021-11-18 ENCOUNTER — NON-APPOINTMENT (OUTPATIENT)
Age: 86
End: 2021-11-18

## 2021-11-18 LAB
ALBUMIN SERPL ELPH-MCNC: 4.4 G/DL
ALP BLD-CCNC: 100 U/L
ALT SERPL-CCNC: 62 U/L
ANION GAP SERPL CALC-SCNC: 15 MMOL/L
AST SERPL-CCNC: 41 U/L
BASOPHILS # BLD AUTO: 0.13 K/UL
BASOPHILS # BLD AUTO: 0.13 K/UL
BASOPHILS NFR BLD AUTO: 1.8 %
BASOPHILS NFR BLD AUTO: 1.8 %
BILIRUB SERPL-MCNC: 0.2 MG/DL
BUN SERPL-MCNC: 19 MG/DL
CALCIUM SERPL-MCNC: 9.7 MG/DL
CHLORIDE SERPL-SCNC: 106 MMOL/L
CO2 SERPL-SCNC: 21 MMOL/L
CREAT SERPL-MCNC: 0.57 MG/DL
EOSINOPHIL # BLD AUTO: 0 K/UL
EOSINOPHIL # BLD AUTO: 0 K/UL
EOSINOPHIL NFR BLD AUTO: 0 %
EOSINOPHIL NFR BLD AUTO: 0 %
FERRITIN SERPL-MCNC: 352 NG/ML
GIANT PLATELETS BLD QL SMEAR: PRESENT
GLUCOSE SERPL-MCNC: 67 MG/DL
HCT VFR BLD CALC: 43.7 %
HGB BLD-MCNC: 14.3 G/DL
HYPOCHROMIA BLD QL SMEAR: SLIGHT
IRON SATN MFR SERPL: 45 %
IRON SERPL-MCNC: 136 UG/DL
LYMPHOCYTES # BLD AUTO: 1.43 K/UL
LYMPHOCYTES # BLD AUTO: 1.43 K/UL
LYMPHOCYTES NFR BLD AUTO: 19.4 %
LYMPHOCYTES NFR BLD AUTO: 19.4 %
MAN DIFF?: NORMAL
MCHC RBC-ENTMCNC: 28.9 PG
MCHC RBC-ENTMCNC: 32.7 GM/DL
MCV RBC AUTO: 88.3 FL
MONOCYTES # BLD AUTO: 0.52 K/UL
MONOCYTES # BLD AUTO: 0.52 K/UL
MONOCYTES NFR BLD AUTO: 7.1 %
MONOCYTES NFR BLD AUTO: 7.1 %
MSMEAR: NORMAL
MYELOCYTES NFR BLD: 0.9 %
NEUTROPHILS # BLD AUTO: 5.23 K/UL
NEUTROPHILS # BLD AUTO: 5.23 K/UL
NEUTROPHILS NFR BLD AUTO: 70.8 %
NEUTROPHILS NFR BLD AUTO: 70.8 %
OVALOCYTES BLD QL SMEAR: SLIGHT
PLAT MORPH BLD: ABNORMAL
PLATELET # BLD AUTO: 273 K/UL
POIKILOCYTOSIS BLD QL SMEAR: SLIGHT
POLYCHROMASIA BLD QL SMEAR: SLIGHT
POTASSIUM SERPL-SCNC: 4.7 MMOL/L
PROT SERPL-MCNC: 6.9 G/DL
RBC # BLD: 4.95 M/UL
RBC # FLD: 20.5 %
RBC BLD AUTO: ABNORMAL
SODIUM SERPL-SCNC: 142 MMOL/L
SPHEROCYTES BLD QL SMEAR: SLIGHT
TARGETS BLD QL SMEAR: SLIGHT
TIBC SERPL-MCNC: 300 UG/DL
UIBC SERPL-MCNC: 164 UG/DL
WBC # FLD AUTO: 7.38 K/UL

## 2021-12-28 ENCOUNTER — APPOINTMENT (OUTPATIENT)
Dept: HEMATOLOGY ONCOLOGY | Facility: CLINIC | Age: 86
End: 2021-12-28
Payer: MEDICARE

## 2021-12-28 VITALS
OXYGEN SATURATION: 99 % | BODY MASS INDEX: 17.66 KG/M2 | HEART RATE: 89 BPM | TEMPERATURE: 96.3 F | HEIGHT: 62 IN | SYSTOLIC BLOOD PRESSURE: 124 MMHG | DIASTOLIC BLOOD PRESSURE: 62 MMHG | WEIGHT: 96 LBS

## 2021-12-28 LAB
RBC # BLD: 4.6 M/UL
RETICS # AUTO: 1.3 %
RETICS AGGREG/RBC NFR: 58.9 K/UL

## 2021-12-28 PROCEDURE — 36415 COLL VENOUS BLD VENIPUNCTURE: CPT

## 2021-12-28 PROCEDURE — 99213 OFFICE O/P EST LOW 20 MIN: CPT | Mod: 25

## 2021-12-28 RX ORDER — DIPHENOXYLATE HYDROCHLORIDE AND ATROPINE SULFATE 2.5; .025 MG/1; MG/1
TABLET ORAL
Refills: 0 | Status: ACTIVE | COMMUNITY

## 2021-12-28 NOTE — REVIEW OF SYSTEMS
[Fatigue] : fatigue [Loss of Hearing] : loss of hearing [Diarrhea] : diarrhea [Anxiety] : anxiety [Negative] : Allergic/Immunologic [Fever] : no fever [Chills] : no chills [Night Sweats] : no night sweats [Recent Change In Weight] : ~T no recent weight change [Dysphagia] : no dysphagia [Nosebleeds] : no nosebleeds [Hoarseness] : no hoarseness [Abdominal Pain] : no abdominal pain [Vomiting] : no vomiting [Constipation] : no constipation [Joint Pain] : no joint pain [Joint Stiffness] : no joint stiffness [Easy Bleeding] : no tendency for easy bleeding [Easy Bruising] : no tendency for easy bruising [FreeTextEntry7] : rarely diarrhea, no blood in stool [de-identified] : Feels anxious regarding the persistence of  pandemic

## 2021-12-28 NOTE — END OF VISIT
[FreeTextEntry3] : All medical record entries made by the Scribe were at my, Dr. Argentina Jones, direction and personally dictated by me on 12/28/2021. I have reviewed the chart and agree that the record accurately reflects my personal performance of the history, physical exam, assessment and plan. I have also personally directed, reviewed, and agreed with the chart.

## 2021-12-28 NOTE — ASSESSMENT
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 Patient received two Feraheme infusions in September. Infusions were well tolerated with symptomatic improvement, with most recent hemoglobin improved from 10.4 to 14.3. As blood loss from the patient's gastrointestinal tract can be sporadic and iron stores had diminished, plan is to keep ahead by replacing iron by infusion when needed. She does not tolerate oral iron. Have ordered B12 and folate, CBC, CMP, ferritin, iron panel, and reticulocyte count. Venipuncture was performed at the office today. Patient was advised to call office to discuss results and next appointment date.

## 2021-12-28 NOTE — REASON FOR VISIT
[Follow-Up Visit] : a follow-up visit for [FreeTextEntry2] : iron deficiency anemia, anemia of chronic blood loss, fatigue, elevated B12 level

## 2021-12-28 NOTE — PHYSICAL EXAM
[Thin] : thin [Normal] : affect appropriate [de-identified] : bilateral hearing aids [de-identified] : RRR, S1, S2, ?murmur, no rubs or gallops, occasional ectopic [de-identified] : multiple surgical scars [de-identified] : s/p left patellar fracture,  s/p right patellar fracture, s/p left fifth finger fracture, s/p right elbow fracture,  s/p left wrist fracture, all well healed [de-identified] : multiple scars on abdomen,  multiple senile keratoses, scar right knee, scar left knee, soft tissue mass 2 cm on scalp unchanged

## 2021-12-28 NOTE — HISTORY OF PRESENT ILLNESS
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 Patient received iron by infusion (Feraheme) twice in September at [facility], with symptomatic improvement. At her last visit in mid November, she was found to have a hemoglobin of 14.3 with a hematocrit of 43.7. The white blood count and platelet counts were normal. Ferritin was found to be 352 with an iron saturation of 45%, both of which have been elevated since Feraheme infusions in September. Comprehensive metabolic panel was normal except for a glucose of 67, AST of 41, and ALT of 62. Patient complains of some fatigue and anxiety regarding the COVID-19 pandemic, but otherwise feels generally well at this time, with no acute complaints. Her chronic diarrhea is well managed with Lomotil. She denies hematochezia, epistaxis,  fever, night sweats, chills, or recent infections.

## 2021-12-29 LAB
ALBUMIN SERPL ELPH-MCNC: 4.4 G/DL
ALP BLD-CCNC: 104 U/L
ALT SERPL-CCNC: 46 U/L
ANION GAP SERPL CALC-SCNC: 12 MMOL/L
AST SERPL-CCNC: 38 U/L
BASOPHILS # BLD AUTO: 0 K/UL
BASOPHILS # BLD AUTO: 0 K/UL
BASOPHILS NFR BLD AUTO: 0 %
BASOPHILS NFR BLD AUTO: 0 %
BILIRUB SERPL-MCNC: 0.3 MG/DL
BUN SERPL-MCNC: 14 MG/DL
CALCIUM SERPL-MCNC: 9.8 MG/DL
CHLORIDE SERPL-SCNC: 105 MMOL/L
CO2 SERPL-SCNC: 23 MMOL/L
CREAT SERPL-MCNC: 0.61 MG/DL
EOSINOPHIL # BLD AUTO: 0.12 K/UL
EOSINOPHIL # BLD AUTO: 0.12 K/UL
EOSINOPHIL NFR BLD AUTO: 1.7 %
EOSINOPHIL NFR BLD AUTO: 1.7 %
FERRITIN SERPL-MCNC: 272 NG/ML
FOLATE SERPL-MCNC: 11 NG/ML
GIANT PLATELETS BLD QL SMEAR: PRESENT
GLUCOSE SERPL-MCNC: 84 MG/DL
HCT VFR BLD CALC: 42.6 %
HGB BLD-MCNC: 13.8 G/DL
IRON SATN MFR SERPL: 39 %
IRON SERPL-MCNC: 121 UG/DL
LYMPHOCYTES # BLD AUTO: 1.03 K/UL
LYMPHOCYTES # BLD AUTO: 1.03 K/UL
LYMPHOCYTES NFR BLD AUTO: 14.8 %
LYMPHOCYTES NFR BLD AUTO: 14.8 %
MAN DIFF?: NORMAL
MCHC RBC-ENTMCNC: 30 PG
MCHC RBC-ENTMCNC: 32.4 GM/DL
MCV RBC AUTO: 92.6 FL
MONOCYTES # BLD AUTO: 0.49 K/UL
MONOCYTES # BLD AUTO: 0.49 K/UL
MONOCYTES NFR BLD AUTO: 7 %
MONOCYTES NFR BLD AUTO: 7 %
MSMEAR: NORMAL
MYELOCYTES NFR BLD: 0.9 %
NEUTROPHILS # BLD AUTO: 5.09 K/UL
NEUTROPHILS # BLD AUTO: 5.09 K/UL
NEUTROPHILS NFR BLD AUTO: 73 %
NEUTROPHILS NFR BLD AUTO: 73 %
PLAT MORPH BLD: NORMAL
PLATELET # BLD AUTO: 270 K/UL
POLYCHROMASIA BLD QL SMEAR: SLIGHT
POTASSIUM SERPL-SCNC: 4.8 MMOL/L
PROT SERPL-MCNC: 7 G/DL
RBC # BLD: 4.6 M/UL
RBC # FLD: 16.2 %
RBC BLD AUTO: NORMAL
SMUDGE CELLS # BLD: PRESENT
SODIUM SERPL-SCNC: 140 MMOL/L
TIBC SERPL-MCNC: 315 UG/DL
UIBC SERPL-MCNC: 194 UG/DL
VARIANT LYMPHS # BLD MANUAL: 2.6 %
VIT B12 SERPL-MCNC: 1086 PG/ML
WBC # FLD AUTO: 6.97 K/UL

## 2021-12-30 ENCOUNTER — NON-APPOINTMENT (OUTPATIENT)
Age: 86
End: 2021-12-30

## 2022-02-02 ENCOUNTER — APPOINTMENT (OUTPATIENT)
Dept: HEMATOLOGY ONCOLOGY | Facility: CLINIC | Age: 87
End: 2022-02-02
Payer: MEDICARE

## 2022-02-02 VITALS
HEIGHT: 62 IN | BODY MASS INDEX: 17.3 KG/M2 | WEIGHT: 94 LBS | HEART RATE: 87 BPM | OXYGEN SATURATION: 99 % | SYSTOLIC BLOOD PRESSURE: 130 MMHG | TEMPERATURE: 96 F | DIASTOLIC BLOOD PRESSURE: 82 MMHG

## 2022-02-02 LAB
RBC # BLD: 4.59 M/UL
RETICS # AUTO: 1.3 %
RETICS AGGREG/RBC NFR: 61 K/UL

## 2022-02-02 PROCEDURE — 36415 COLL VENOUS BLD VENIPUNCTURE: CPT

## 2022-02-02 PROCEDURE — 99213 OFFICE O/P EST LOW 20 MIN: CPT | Mod: 25

## 2022-02-02 NOTE — REVIEW OF SYSTEMS
[Fatigue] : fatigue [Diarrhea] : diarrhea [Depression] : depression [Negative] : Allergic/Immunologic [Fever] : no fever [Chills] : no chills [Night Sweats] : no night sweats [Recent Change In Weight] : ~T no recent weight change [Chest Pain] : no chest pain [Palpitations] : no palpitations [Shortness Of Breath] : no shortness of breath [Abdominal Pain] : no abdominal pain [Easy Bleeding] : no tendency for easy bleeding [Easy Bruising] : no tendency for easy bruising [de-identified] : Recent unexpected death of her son

## 2022-02-02 NOTE — END OF VISIT
[FreeTextEntry3] : All medical record entries made by the Scribe were at my, Dr. Argentina Jones, direction and personally dictated by me on 02/02/2022. I have reviewed the chart and agree that the record accurately reflects my personal performance of the history, physical exam, assessment and plan. I have also personally directed, reviewed, and agreed with the chart.

## 2022-02-02 NOTE — HISTORY OF PRESENT ILLNESS
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 Patient received iron by infusion (Feraheme) twice in September at [facility], with symptomatic improvement. At her last visit in late December, the CBC was essentially normal with a hemoglobin of 13.8 and a hematocrit of 42.7. Ferritin was 272 and iron saturation was 39%, still reflecting the iron infusions in September. Comprehensive metabolic panel was normal except for an ALT of 46. B12 and folate were normal. Patient has no acute physical complaints, but has been very distressed since she lost her son one month ago. Her chronic diarrhea, which has increased of late, is well managed with Lomotil. She denies hematochezia, fever, night sweats, chills, or recent infections.

## 2022-02-02 NOTE — ASSESSMENT
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 Patient received two Feraheme infusions in September. Infusions were well tolerated with symptomatic improvement, and hemoglobin has been within the normal range since. As blood loss from the patient's gastrointestinal tract can be sporadic, plan is to keep ahead by replacing iron by infusion when needed. She does not tolerate oral iron. Have ordered CBC, manual differential, reticulocyte count, CMP, iron panel, and ferritin. Venipuncture was performed at the office today. Patient was advised to call office to discuss results and next appointment date.

## 2022-02-02 NOTE — PHYSICAL EXAM
[Thin] : thin [Normal] : affect appropriate [de-identified] : bilateral hearing aids [de-identified] : RRR, S1, S2, ?murmur, no rubs or gallops, occasional ectopic [de-identified] : multiple surgical scars [de-identified] : s/p left patellar fracture,  s/p right patellar fracture, s/p left fifth finger fracture, s/p right elbow fracture,  s/p left wrist fracture, all well healed [de-identified] : multiple scars on abdomen,  multiple senile keratoses, scar right knee, scar left knee, soft tissue mass 2 cm on scalp unchanged

## 2022-02-03 ENCOUNTER — NON-APPOINTMENT (OUTPATIENT)
Age: 87
End: 2022-02-03

## 2022-02-03 LAB
ALBUMIN SERPL ELPH-MCNC: 4.3 G/DL
ALP BLD-CCNC: 101 U/L
ALT SERPL-CCNC: 43 U/L
ANION GAP SERPL CALC-SCNC: 16 MMOL/L
AST SERPL-CCNC: 34 U/L
BILIRUB SERPL-MCNC: 0.2 MG/DL
BLD SMEAR INTERP: NORMAL
BUN SERPL-MCNC: 14 MG/DL
CALCIUM SERPL-MCNC: 9.7 MG/DL
CHLORIDE SERPL-SCNC: 105 MMOL/L
CO2 SERPL-SCNC: 20 MMOL/L
CREAT SERPL-MCNC: 0.58 MG/DL
EOSINOPHIL NFR BLD AUTO: 2 %
FERRITIN SERPL-MCNC: 263 NG/ML
GLUCOSE SERPL-MCNC: 89 MG/DL
HCT VFR BLD CALC: 43.3 %
HGB BLD-MCNC: 13.7 G/DL
IRON SATN MFR SERPL: 38 %
IRON SERPL-MCNC: 120 UG/DL
LYMPHOCYTES NFR BLD AUTO: 17 %
MAN DIFF?: NORMAL
MCHC RBC-ENTMCNC: 29.8 PG
MCHC RBC-ENTMCNC: 31.6 GM/DL
MCV RBC AUTO: 94.3 FL
MONOCYTES NFR BLD AUTO: 7 %
MSMEAR: YES
NEUTROPHILS NFR BLD AUTO: 74 %
NEUTROPHILS NFR BLD AUTO: 74 %
PLAT MORPH BLD: ABNORMAL
PLATELET # BLD AUTO: 292 K/UL
POTASSIUM SERPL-SCNC: 4 MMOL/L
PROT SERPL-MCNC: 6.9 G/DL
RBC # BLD: 4.59 M/UL
RBC # FLD: 13.5 %
RBC BLD AUTO: NORMAL
SODIUM SERPL-SCNC: 141 MMOL/L
TIBC SERPL-MCNC: 317 UG/DL
UIBC SERPL-MCNC: 197 UG/DL
WBC # FLD AUTO: 7.36 K/UL

## 2022-03-16 ENCOUNTER — APPOINTMENT (OUTPATIENT)
Dept: HEMATOLOGY ONCOLOGY | Facility: CLINIC | Age: 87
End: 2022-03-16
Payer: MEDICARE

## 2022-03-16 VITALS
SYSTOLIC BLOOD PRESSURE: 122 MMHG | OXYGEN SATURATION: 99 % | WEIGHT: 92 LBS | BODY MASS INDEX: 16.93 KG/M2 | TEMPERATURE: 96.2 F | HEIGHT: 62 IN | HEART RATE: 70 BPM | DIASTOLIC BLOOD PRESSURE: 78 MMHG

## 2022-03-16 DIAGNOSIS — F32.A DEPRESSION, UNSPECIFIED: ICD-10-CM

## 2022-03-16 PROCEDURE — 99213 OFFICE O/P EST LOW 20 MIN: CPT | Mod: 25

## 2022-03-16 PROCEDURE — 36415 COLL VENOUS BLD VENIPUNCTURE: CPT

## 2022-03-16 NOTE — PHYSICAL EXAM
[Thin] : thin [Normal] : affect appropriate [de-identified] : bilateral hearing aids [de-identified] : RRR, S1, S2, ?murmur, no rubs or gallops, occasional ectopic [de-identified] : multiple surgical scars [de-identified] : s/p left patellar fracture,  s/p right patellar fracture, s/p left fifth finger fracture, s/p right elbow fracture,  s/p left wrist fracture, all well healed [de-identified] : multiple scars on abdomen,  multiple senile keratoses, scar right knee, scar left knee, soft tissue mass 2 cm on scalp unchanged

## 2022-03-16 NOTE — HISTORY OF PRESENT ILLNESS
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 Patient received iron by infusion (Feraheme) twice in September at [facility], with symptomatic improvement. At last visit,Hemoglobin was 13.7, hematocrit 43.3. Iron panel normal and ferritin 263, still reflecting the last iron infusion. Patient is very depressed over the recent loss of her son. She has had exacerbations of her colitis as a result hast 4 pounds in the last 3 months. She is very fatigued  and sleeps poorly. Denies fever, chills, visible blood in urine or stool, abdominal pain or recent infections. Of note, patient received her fourth dose of the Pfizer vaccine (second booster dose on 3/8/22. \par \par

## 2022-03-16 NOTE — ASSESSMENT
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 She has been depressed over recent loss of her son and has not felt well due to exacerbation of her colitis. Have ordered CBC, manual differential, sed rate, reticulocyte count, CMP, iron panel, ferritin and TSH. Venipuncture performed in the office today. Iron infusion to be determined pending results. Patient was advised to call office to discuss results and next appointment date.

## 2022-03-16 NOTE — REVIEW OF SYSTEMS
[Fatigue] : fatigue [Recent Change In Weight] : ~T recent weight change [Depression] : depression [Negative] : Allergic/Immunologic [Fever] : no fever [Chills] : no chills [Night Sweats] : no night sweats [Chest Pain] : no chest pain [Palpitations] : no palpitations [Shortness Of Breath] : no shortness of breath [SOB on Exertion] : no shortness of breath during exertion [Abdominal Pain] : no abdominal pain [Joint Pain] : no joint pain [Skin Rash] : no skin rash [Easy Bleeding] : no tendency for easy bleeding [Easy Bruising] : no tendency for easy bruising [FreeTextEntry2] : Lost 4 pounds in the last 3 months [de-identified] : Recent loss of son, daughter with MS

## 2022-03-16 NOTE — REASON FOR VISIT
[Follow-Up Visit] : a follow-up visit for [FreeTextEntry2] : Iron deficiency anemia, Anemia of chronic blood, fatigue,

## 2022-03-17 ENCOUNTER — NON-APPOINTMENT (OUTPATIENT)
Age: 87
End: 2022-03-17

## 2022-03-17 LAB
ALBUMIN SERPL ELPH-MCNC: 4.1 G/DL
ALP BLD-CCNC: 95 U/L
ALT SERPL-CCNC: 60 U/L
ANION GAP SERPL CALC-SCNC: 11 MMOL/L
AST SERPL-CCNC: 48 U/L
BASOPHILS # BLD AUTO: 0 K/UL
BASOPHILS # BLD AUTO: 0 K/UL
BASOPHILS NFR BLD AUTO: 0 %
BASOPHILS NFR BLD AUTO: 0 %
BILIRUB SERPL-MCNC: 0.2 MG/DL
BUN SERPL-MCNC: 12 MG/DL
CALCIUM SERPL-MCNC: 9.6 MG/DL
CHLORIDE SERPL-SCNC: 107 MMOL/L
CO2 SERPL-SCNC: 22 MMOL/L
CREAT SERPL-MCNC: 0.53 MG/DL
EGFR: 89 ML/MIN/1.73M2
EOSINOPHIL # BLD AUTO: 0.07 K/UL
EOSINOPHIL # BLD AUTO: 0.07 K/UL
EOSINOPHIL NFR BLD AUTO: 0.9 %
EOSINOPHIL NFR BLD AUTO: 0.9 %
ERYTHROCYTE [SEDIMENTATION RATE] IN BLOOD BY WESTERGREN METHOD: 24 MM/HR
FERRITIN SERPL-MCNC: 233 NG/ML
GIANT PLATELETS BLD QL SMEAR: PRESENT
GLUCOSE SERPL-MCNC: 72 MG/DL
HCT VFR BLD CALC: 41.9 %
HGB BLD-MCNC: 13.5 G/DL
IRON SATN MFR SERPL: 26 %
IRON SERPL-MCNC: 87 UG/DL
LYMPHOCYTES # BLD AUTO: 0.93 K/UL
LYMPHOCYTES # BLD AUTO: 0.93 K/UL
LYMPHOCYTES NFR BLD AUTO: 11.3 %
LYMPHOCYTES NFR BLD AUTO: 11.3 %
MAN DIFF?: NORMAL
MCHC RBC-ENTMCNC: 30.1 PG
MCHC RBC-ENTMCNC: 32.2 GM/DL
MCV RBC AUTO: 93.3 FL
MONOCYTES # BLD AUTO: 0.29 K/UL
MONOCYTES # BLD AUTO: 0.29 K/UL
MONOCYTES NFR BLD AUTO: 3.5 %
MONOCYTES NFR BLD AUTO: 3.5 %
MSMEAR: NORMAL
NEUTROPHILS # BLD AUTO: 6.95 K/UL
NEUTROPHILS # BLD AUTO: 6.95 K/UL
NEUTROPHILS NFR BLD AUTO: 84.3 %
NEUTROPHILS NFR BLD AUTO: 84.3 %
PLAT MORPH BLD: ABNORMAL
PLATELET # BLD AUTO: 256 K/UL
POTASSIUM SERPL-SCNC: 5.1 MMOL/L
PROT SERPL-MCNC: 6.7 G/DL
RBC # BLD: 4.49 M/UL
RBC # BLD: 4.49 M/UL
RBC # FLD: 14 %
RBC BLD AUTO: NORMAL
RETICS # AUTO: 1.4 %
RETICS AGGREG/RBC NFR: 64.7 K/UL
SMUDGE CELLS # BLD: PRESENT
SODIUM SERPL-SCNC: 140 MMOL/L
TIBC SERPL-MCNC: 333 UG/DL
TSH SERPL-ACNC: 3.18 UIU/ML
UIBC SERPL-MCNC: 245 UG/DL
WBC # FLD AUTO: 8.24 K/UL

## 2022-04-28 ENCOUNTER — APPOINTMENT (OUTPATIENT)
Dept: HEMATOLOGY ONCOLOGY | Facility: CLINIC | Age: 87
End: 2022-04-28
Payer: MEDICARE

## 2022-04-28 ENCOUNTER — NON-APPOINTMENT (OUTPATIENT)
Age: 87
End: 2022-04-28

## 2022-04-28 VITALS
OXYGEN SATURATION: 97 % | WEIGHT: 92 LBS | SYSTOLIC BLOOD PRESSURE: 122 MMHG | HEART RATE: 75 BPM | HEIGHT: 62 IN | TEMPERATURE: 96.3 F | DIASTOLIC BLOOD PRESSURE: 70 MMHG | BODY MASS INDEX: 16.93 KG/M2

## 2022-04-28 LAB
ERYTHROCYTE [SEDIMENTATION RATE] IN BLOOD BY WESTERGREN METHOD: 15 MM/HR
RBC # BLD: 4.68 M/UL
RETICS # AUTO: 1.1 %
RETICS AGGREG/RBC NFR: 53.4 K/UL

## 2022-04-28 PROCEDURE — 36415 COLL VENOUS BLD VENIPUNCTURE: CPT

## 2022-04-28 PROCEDURE — 99213 OFFICE O/P EST LOW 20 MIN: CPT | Mod: 25

## 2022-04-28 NOTE — REVIEW OF SYSTEMS
[Fatigue] : fatigue [Diarrhea] : diarrhea [Negative] : Allergic/Immunologic [Fever] : no fever [Chills] : no chills [Night Sweats] : no night sweats [Recent Change In Weight] : ~T no recent weight change [Chest Pain] : no chest pain [Shortness Of Breath] : no shortness of breath [Abdominal Pain] : no abdominal pain [Vomiting] : no vomiting [Joint Pain] : no joint pain [Joint Stiffness] : no joint stiffness [Skin Rash] : no skin rash [Easy Bleeding] : no tendency for easy bleeding [Easy Bruising] : no tendency for easy bruising [FreeTextEntry2] : Needs to take naps,  [FreeTextEntry7] : Has had more frequent bouts of colitis

## 2022-04-28 NOTE — PHYSICAL EXAM
[Thin] : thin [Normal] : affect appropriate [de-identified] : bilateral hearing aids [de-identified] : RRR, S1, S2, ?murmur, no rubs or gallops, occasional ectopic [de-identified] : multiple surgical scars [de-identified] : s/p left patellar fracture,  s/p right patellar fracture, s/p left fifth finger fracture, s/p right elbow fracture,  s/p left wrist fracture, all well healed [de-identified] : multiple scars on abdomen,  multiple senile keratoses, scar right knee, scar left knee, soft tissue mass 2 cm on scalp unchanged

## 2022-04-28 NOTE — REASON FOR VISIT
[Follow-Up Visit] : a follow-up visit for [FreeTextEntry2] : Iron deficiency anemia, anemia of chronic blood loss, fatigue,

## 2022-04-28 NOTE — ASSESSMENT
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 She has been depressed over recent loss of her son and has not felt well due to exacerbation of her colitis. Have ordered CBC, manual differential, sed rate, reticulocyte count, CMP, iron panel, ferritin and TSH. Venipuncture performed in the office today. Iron infusion to be determined pending results. Patient was advised to call office to discuss results and next appointment date. \par \par

## 2022-04-28 NOTE — HISTORY OF PRESENT ILLNESS
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 Patient received iron by infusion (Feraheme) twice in September at [facility], with symptomatic improvement. At last visit,Hemoglobin was 13.5, hematocrit 41.9. Iron panel normal and ferritin 233, still reflecting the last iron infusion. Patient is very depressed over the recent loss of her son. She has had exacerbations of her colitis and lost 5 pounds since the fall of 2021.. She is very fatigued and sleeps poorly. She is planning a  river cruise which may help her depression as she is not in favor of taking medications.  Denies fever, chills, visible blood in urine or stool, abdominal pain or recent infections. Of note, patient received her fourth dose of the Pfizer vaccine (second booster dose on 3/8/22 ). \par \par

## 2022-04-29 LAB
ALBUMIN SERPL ELPH-MCNC: 4.3 G/DL
ALP BLD-CCNC: 104 U/L
ALT SERPL-CCNC: 66 U/L
ANION GAP SERPL CALC-SCNC: 11 MMOL/L
AST SERPL-CCNC: 43 U/L
BASOPHILS # BLD AUTO: 0.06 K/UL
BASOPHILS # BLD AUTO: 0.06 K/UL
BASOPHILS NFR BLD AUTO: 0.9 %
BASOPHILS NFR BLD AUTO: 0.9 %
BILIRUB SERPL-MCNC: 0.4 MG/DL
BUN SERPL-MCNC: 14 MG/DL
CALCIUM SERPL-MCNC: 10.1 MG/DL
CHLORIDE SERPL-SCNC: 106 MMOL/L
CO2 SERPL-SCNC: 24 MMOL/L
CREAT SERPL-MCNC: 0.56 MG/DL
EGFR: 88 ML/MIN/1.73M2
EOSINOPHIL # BLD AUTO: 0.17 K/UL
EOSINOPHIL # BLD AUTO: 0.17 K/UL
EOSINOPHIL NFR BLD AUTO: 2.6 %
EOSINOPHIL NFR BLD AUTO: 2.6 %
FERRITIN SERPL-MCNC: 178 NG/ML
GIANT PLATELETS BLD QL SMEAR: PRESENT
GLUCOSE SERPL-MCNC: 78 MG/DL
HCT VFR BLD CALC: 42.9 %
HGB BLD-MCNC: 13.9 G/DL
IRON SATN MFR SERPL: 24 %
IRON SERPL-MCNC: 80 UG/DL
LYMPHOCYTES # BLD AUTO: 0.77 K/UL
LYMPHOCYTES # BLD AUTO: 0.77 K/UL
LYMPHOCYTES NFR BLD AUTO: 11.4 %
LYMPHOCYTES NFR BLD AUTO: 11.4 %
MAN DIFF?: NORMAL
MCHC RBC-ENTMCNC: 29.7 PG
MCHC RBC-ENTMCNC: 32.4 GM/DL
MCV RBC AUTO: 91.7 FL
MONOCYTES # BLD AUTO: 0.41 K/UL
MONOCYTES # BLD AUTO: 0.41 K/UL
MONOCYTES NFR BLD AUTO: 6.1 %
MONOCYTES NFR BLD AUTO: 6.1 %
MSMEAR: NORMAL
NEUTROPHILS # BLD AUTO: 5.31 K/UL
NEUTROPHILS # BLD AUTO: 5.31 K/UL
NEUTROPHILS NFR BLD AUTO: 79 %
NEUTROPHILS NFR BLD AUTO: 79 %
PLAT MORPH BLD: ABNORMAL
PLATELET # BLD AUTO: 262 K/UL
POTASSIUM SERPL-SCNC: 4.7 MMOL/L
PROT SERPL-MCNC: 6.6 G/DL
RBC # BLD: 4.68 M/UL
RBC # FLD: 14.3 %
RBC BLD AUTO: NORMAL
SMUDGE CELLS # BLD: PRESENT
SODIUM SERPL-SCNC: 141 MMOL/L
TIBC SERPL-MCNC: 334 UG/DL
UIBC SERPL-MCNC: 253 UG/DL
WBC # FLD AUTO: 6.72 K/UL

## 2022-06-15 ENCOUNTER — APPOINTMENT (OUTPATIENT)
Dept: HEMATOLOGY ONCOLOGY | Facility: CLINIC | Age: 87
End: 2022-06-15
Payer: MEDICARE

## 2022-06-15 VITALS
BODY MASS INDEX: 16.56 KG/M2 | HEART RATE: 95 BPM | HEIGHT: 62 IN | DIASTOLIC BLOOD PRESSURE: 68 MMHG | WEIGHT: 90 LBS | SYSTOLIC BLOOD PRESSURE: 122 MMHG | OXYGEN SATURATION: 96 % | TEMPERATURE: 96.9 F

## 2022-06-15 LAB
ERYTHROCYTE [SEDIMENTATION RATE] IN BLOOD BY WESTERGREN METHOD: 20 MM/HR
RBC # BLD: 3.8 M/UL
RETICS # AUTO: 2.7 %
RETICS AGGREG/RBC NFR: 102.6 K/UL

## 2022-06-15 PROCEDURE — 36415 COLL VENOUS BLD VENIPUNCTURE: CPT

## 2022-06-15 PROCEDURE — 99213 OFFICE O/P EST LOW 20 MIN: CPT | Mod: 25

## 2022-06-15 NOTE — PHYSICAL EXAM
[Thin] : thin [Normal] : affect appropriate [de-identified] : bilateral hearing aids [de-identified] : RRR, S1, S2, ?murmur, no rubs or gallops, occasional ectopic [de-identified] : multiple surgical scars [de-identified] : s/p left patellar fracture,  s/p right patellar fracture, s/p left fifth finger fracture, s/p right elbow fracture,  s/p left wrist fracture [de-identified] : multiple scars on abdomen,  multiple senile keratoses, scar right knee, scar left knee, soft tissue mass 2 cm on scalp unchanged

## 2022-06-15 NOTE — REVIEW OF SYSTEMS
[Diarrhea] : diarrhea [Negative] : Allergic/Immunologic [Fatigue] : fatigue [Fever] : no fever [Chills] : no chills [Night Sweats] : no night sweats [Recent Change In Weight] : ~T no recent weight change [Chest Pain] : no chest pain [Shortness Of Breath] : no shortness of breath [Abdominal Pain] : no abdominal pain [Joint Pain] : no joint pain [Joint Stiffness] : no joint stiffness [Skin Rash] : no skin rash [Easy Bruising] : no tendency for easy bruising [FreeTextEntry7] : has had increase in colitis symptoms and diarrhea

## 2022-06-15 NOTE — ASSESSMENT
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 She has  exacerbations of her colitis since last visit which she controls with Lomotil.  She has an upcoming appointment with her gastroenterologist, Dr. Maico Turner in 2 days results will be sent.  Have ordered CBC, manual differential, sed rate, reticulocyte count, CMP, iron panel, ferritin.Venipuncture was performed in the office today. Iron infusion to be determined pending results. Patient was advised to call office to discuss results and next appointment date. \par \par

## 2022-06-15 NOTE — HISTORY OF PRESENT ILLNESS
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 Patient received iron by infusion (Feraheme) twice in September 2021 at [facility], with symptomatic improvement. At last visit, the CBC was completely normal with a hemoglobin of 13.9 and a hematocrit of 42.9. Iron studies revealed a normal iron profile with a slightly elevated ferritin at 178.. Since last visit, patient went on [place] from [place] 9th-20th and experienced multiple episodes of diarrhea.She has had exacerbations of her colitis as a result has lost weight (2 pounds).  Patient denies worsening fatigue, joint pain, abdominal pain, blood in urine, blood in stool or recent infections.

## 2022-06-16 ENCOUNTER — NON-APPOINTMENT (OUTPATIENT)
Age: 87
End: 2022-06-16

## 2022-06-16 LAB
ALBUMIN SERPL ELPH-MCNC: 4.1 G/DL
ALP BLD-CCNC: 88 U/L
ALT SERPL-CCNC: 48 U/L
ANION GAP SERPL CALC-SCNC: 11 MMOL/L
AST SERPL-CCNC: 45 U/L
BASOPHILS # BLD AUTO: 0 K/UL
BASOPHILS # BLD AUTO: 0 K/UL
BASOPHILS NFR BLD AUTO: 0 %
BASOPHILS NFR BLD AUTO: 0 %
BILIRUB SERPL-MCNC: 0.2 MG/DL
BUN SERPL-MCNC: 18 MG/DL
CALCIUM SERPL-MCNC: 9.4 MG/DL
CHLORIDE SERPL-SCNC: 108 MMOL/L
CO2 SERPL-SCNC: 23 MMOL/L
CREAT SERPL-MCNC: 0.61 MG/DL
EGFR: 86 ML/MIN/1.73M2
EOSINOPHIL # BLD AUTO: 0.11 K/UL
EOSINOPHIL # BLD AUTO: 0.11 K/UL
EOSINOPHIL NFR BLD AUTO: 1.7 %
EOSINOPHIL NFR BLD AUTO: 1.7 %
FERRITIN SERPL-MCNC: 36 NG/ML
GIANT PLATELETS BLD QL SMEAR: PRESENT
GLUCOSE SERPL-MCNC: 79 MG/DL
HCT VFR BLD CALC: 35.4 %
HGB BLD-MCNC: 11.4 G/DL
HYPOCHROMIA BLD QL SMEAR: SLIGHT
IRON SATN MFR SERPL: 10 %
IRON SERPL-MCNC: 36 UG/DL
LYMPHOCYTES # BLD AUTO: 0.87 K/UL
LYMPHOCYTES # BLD AUTO: 0.87 K/UL
LYMPHOCYTES NFR BLD AUTO: 12.9 %
LYMPHOCYTES NFR BLD AUTO: 12.9 %
MAN DIFF?: NORMAL
MCHC RBC-ENTMCNC: 30 PG
MCHC RBC-ENTMCNC: 32.2 GM/DL
MCV RBC AUTO: 93.2 FL
MONOCYTES # BLD AUTO: 0.46 K/UL
MONOCYTES # BLD AUTO: 0.46 K/UL
MONOCYTES NFR BLD AUTO: 6.9 %
MONOCYTES NFR BLD AUTO: 6.9 %
MSMEAR: NORMAL
NEUTROPHILS # BLD AUTO: 5.27 K/UL
NEUTROPHILS # BLD AUTO: 5.27 K/UL
NEUTROPHILS NFR BLD AUTO: 78.5 %
NEUTROPHILS NFR BLD AUTO: 78.5 %
PLAT MORPH BLD: ABNORMAL
PLATELET # BLD AUTO: 250 K/UL
POLYCHROMASIA BLD QL SMEAR: SLIGHT
POTASSIUM SERPL-SCNC: 4.7 MMOL/L
PROT SERPL-MCNC: 6.4 G/DL
RBC # BLD: 3.8 M/UL
RBC # FLD: 15 %
RBC BLD AUTO: NORMAL
SMUDGE CELLS # BLD: PRESENT
SODIUM SERPL-SCNC: 142 MMOL/L
TIBC SERPL-MCNC: 361 UG/DL
UIBC SERPL-MCNC: 324 UG/DL
WBC # FLD AUTO: 6.71 K/UL

## 2022-06-29 ENCOUNTER — OUTPATIENT (OUTPATIENT)
Dept: OUTPATIENT SERVICES | Facility: HOSPITAL | Age: 87
LOS: 1 days | End: 2022-06-29
Payer: MEDICARE

## 2022-06-29 ENCOUNTER — APPOINTMENT (OUTPATIENT)
Dept: INFUSION THERAPY | Facility: CLINIC | Age: 87
End: 2022-06-29

## 2022-06-29 VITALS
DIASTOLIC BLOOD PRESSURE: 63 MMHG | HEIGHT: 62 IN | RESPIRATION RATE: 16 BRPM | OXYGEN SATURATION: 96 % | TEMPERATURE: 98 F | SYSTOLIC BLOOD PRESSURE: 102 MMHG | HEART RATE: 69 BPM | WEIGHT: 95.9 LBS

## 2022-06-29 DIAGNOSIS — D50.9 IRON DEFICIENCY ANEMIA, UNSPECIFIED: ICD-10-CM

## 2022-06-29 PROCEDURE — 96365 THER/PROPH/DIAG IV INF INIT: CPT

## 2022-06-29 RX ORDER — FERUMOXYTOL 510 MG/17ML
510 INJECTION INTRAVENOUS ONCE
Refills: 0 | Status: COMPLETED | OUTPATIENT
Start: 2022-06-29 | End: 2022-06-29

## 2022-06-29 RX ADMIN — FERUMOXYTOL 117 MILLIGRAM(S): 510 INJECTION INTRAVENOUS at 08:50

## 2022-07-06 ENCOUNTER — OUTPATIENT (OUTPATIENT)
Dept: OUTPATIENT SERVICES | Facility: HOSPITAL | Age: 87
LOS: 1 days | End: 2022-07-06
Payer: MEDICARE

## 2022-07-06 ENCOUNTER — APPOINTMENT (OUTPATIENT)
Dept: INFUSION THERAPY | Facility: CLINIC | Age: 87
End: 2022-07-06

## 2022-07-06 VITALS
TEMPERATURE: 98 F | HEART RATE: 79 BPM | DIASTOLIC BLOOD PRESSURE: 65 MMHG | OXYGEN SATURATION: 99 % | WEIGHT: 95.9 LBS | HEIGHT: 62 IN | RESPIRATION RATE: 17 BRPM | SYSTOLIC BLOOD PRESSURE: 100 MMHG

## 2022-07-06 DIAGNOSIS — D50.9 IRON DEFICIENCY ANEMIA, UNSPECIFIED: ICD-10-CM

## 2022-07-06 PROCEDURE — 96365 THER/PROPH/DIAG IV INF INIT: CPT

## 2022-07-06 RX ORDER — FERUMOXYTOL 510 MG/17ML
510 INJECTION INTRAVENOUS ONCE
Refills: 0 | Status: COMPLETED | OUTPATIENT
Start: 2022-07-06 | End: 2022-07-06

## 2022-07-06 RX ADMIN — FERUMOXYTOL 510 MILLIGRAM(S): 510 INJECTION INTRAVENOUS at 10:20

## 2022-07-06 RX ADMIN — FERUMOXYTOL 117 MILLIGRAM(S): 510 INJECTION INTRAVENOUS at 09:03

## 2022-07-07 ENCOUNTER — NON-APPOINTMENT (OUTPATIENT)
Age: 87
End: 2022-07-07

## 2022-07-11 ENCOUNTER — NON-APPOINTMENT (OUTPATIENT)
Age: 87
End: 2022-07-11

## 2022-08-17 ENCOUNTER — APPOINTMENT (OUTPATIENT)
Dept: HEMATOLOGY ONCOLOGY | Facility: CLINIC | Age: 87
End: 2022-08-17

## 2022-08-17 VITALS
DIASTOLIC BLOOD PRESSURE: 72 MMHG | WEIGHT: 86 LBS | SYSTOLIC BLOOD PRESSURE: 124 MMHG | BODY MASS INDEX: 15.83 KG/M2 | HEART RATE: 75 BPM | OXYGEN SATURATION: 99 % | TEMPERATURE: 97.1 F | HEIGHT: 62 IN

## 2022-08-17 PROCEDURE — 99214 OFFICE O/P EST MOD 30 MIN: CPT | Mod: 25

## 2022-08-17 PROCEDURE — 36415 COLL VENOUS BLD VENIPUNCTURE: CPT

## 2022-08-17 NOTE — PHYSICAL EXAM
[Thin] : thin [Normal] : affect appropriate [de-identified] : bilateral hearing aids [de-identified] : RRR, S1, S2, ?murmur, no rubs or gallops, occasional ectopic [de-identified] : multiple surgical scars [de-identified] : s/p left patellar fracture,  s/p right patellar fracture, s/p left fifth finger fracture, s/p right elbow fracture,  s/p left wrist fracture [de-identified] : multiple scars on abdomen,  multiple senile keratoses, scar right knee, scar left knee, soft tissue mass 2 cm on scalp unchanged

## 2022-08-17 NOTE — ASSESSMENT
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  Since last visit the patient was treated for fecal impaction with stool overflow which left her weak and incapacitated for several weeks.  She is slowly recovering.Have ordered CBC, manual differential, sed rate, reticulocyte count, CMP, iron panel, ferritin, B12 and folate.Venipuncture was performed in the office today. Iron infusion to be determined pending results. Patient was advised to call office to discuss results and next appointment date. \par \par

## 2022-08-17 NOTE — HISTORY OF PRESENT ILLNESS
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  At last visit, her hemoglobin dropped from 13.9  to 11.4. This is likely the result of her several weeks of colitis. The ferritin was 36 but the iron saturation was low at 10%. The metabolic panel was normal except for slightly elevated AST and ALT. The sed rate was normal at 20. Patient's last iron infusion was in June 2022. Since last visit, patient was incapacitated due to fecal impactions and stool over flow for which she was treated with an intense laxative. Resulting in copious bowel movements, with subsequent 4 pound weight loss and generalized weakness. Today, patient complains of abdominal soreness. Otherwise, patient feels generally at this time with normal bowel movements.  Denies worsening fatigue, joint pain, abdominal pain, blood in urine, blood in stool or recent infections.

## 2022-08-17 NOTE — REASON FOR VISIT
[Follow-Up Visit] : a follow-up visit for [FreeTextEntry2] : Deficiency anemia, anemia of chronic GI blood loss, fatigue, elevated B12, elevated liver enzymes

## 2022-08-17 NOTE — REVIEW OF SYSTEMS
[Fatigue] : fatigue [Recent Change In Weight] : ~T recent weight change [Abdominal Pain] : abdominal pain [Negative] : Allergic/Immunologic [Fever] : no fever [Chills] : no chills [Night Sweats] : no night sweats [Chest Pain] : no chest pain [Palpitations] : no palpitations [Shortness Of Breath] : no shortness of breath [SOB on Exertion] : no shortness of breath during exertion [Vomiting] : no vomiting [Constipation] : no constipation [Diarrhea] : no diarrhea [Joint Pain] : no joint pain [Joint Stiffness] : no joint stiffness [Easy Bleeding] : no tendency for easy bleeding [Easy Bruising] : no tendency for easy bruising [FreeTextEntry2] : Lost 4 pounds [FreeTextEntry7] : vague intermittent abdominal discomfort

## 2022-08-18 ENCOUNTER — NON-APPOINTMENT (OUTPATIENT)
Age: 87
End: 2022-08-18

## 2022-08-18 LAB
ACANTHOCYTES BLD QL SMEAR: SLIGHT
ALBUMIN SERPL ELPH-MCNC: 4.3 G/DL
ALP BLD-CCNC: 89 U/L
ALT SERPL-CCNC: 48 U/L
ANION GAP SERPL CALC-SCNC: 11 MMOL/L
AST SERPL-CCNC: 40 U/L
BASOPHILS # BLD AUTO: 0.11 K/UL
BASOPHILS # BLD AUTO: 0.11 K/UL
BASOPHILS NFR BLD AUTO: 1.7 %
BASOPHILS NFR BLD AUTO: 1.7 %
BILIRUB SERPL-MCNC: <0.2 MG/DL
BUN SERPL-MCNC: 17 MG/DL
CALCIUM SERPL-MCNC: 9.9 MG/DL
CHLORIDE SERPL-SCNC: 104 MMOL/L
CO2 SERPL-SCNC: 24 MMOL/L
CREAT SERPL-MCNC: 0.61 MG/DL
EGFR: 86 ML/MIN/1.73M2
EOSINOPHIL # BLD AUTO: 0 K/UL
EOSINOPHIL # BLD AUTO: 0 K/UL
EOSINOPHIL NFR BLD AUTO: 0 %
EOSINOPHIL NFR BLD AUTO: 0 %
ERYTHROCYTE [SEDIMENTATION RATE] IN BLOOD BY WESTERGREN METHOD: 25 MM/HR
FERRITIN SERPL-MCNC: 27 NG/ML
FOLATE SERPL-MCNC: 13.4 NG/ML
GIANT PLATELETS BLD QL SMEAR: PRESENT
GLUCOSE SERPL-MCNC: 84 MG/DL
HCT VFR BLD CALC: 33.7 %
HGB BLD-MCNC: 9.6 G/DL
HYPOCHROMIA BLD QL SMEAR: SLIGHT
IRON SATN MFR SERPL: 5 %
IRON SERPL-MCNC: 19 UG/DL
LYMPHOCYTES # BLD AUTO: 0.91 K/UL
LYMPHOCYTES # BLD AUTO: 0.91 K/UL
LYMPHOCYTES NFR BLD AUTO: 13.9 %
LYMPHOCYTES NFR BLD AUTO: 13.9 %
MAN DIFF?: NORMAL
MCHC RBC-ENTMCNC: 24.4 PG
MCHC RBC-ENTMCNC: 28.5 GM/DL
MCV RBC AUTO: 85.5 FL
MONOCYTES # BLD AUTO: 0.57 K/UL
MONOCYTES # BLD AUTO: 0.57 K/UL
MONOCYTES NFR BLD AUTO: 8.7 %
MONOCYTES NFR BLD AUTO: 8.7 %
MSMEAR: NORMAL
NEUTROPHILS # BLD AUTO: 4.94 K/UL
NEUTROPHILS # BLD AUTO: 4.94 K/UL
NEUTROPHILS NFR BLD AUTO: 75.7 %
NEUTROPHILS NFR BLD AUTO: 75.7 %
PLAT MORPH BLD: ABNORMAL
PLATELET # BLD AUTO: 359 K/UL
POIKILOCYTOSIS BLD QL SMEAR: SLIGHT
POLYCHROMASIA BLD QL SMEAR: SLIGHT
POTASSIUM SERPL-SCNC: 5.7 MMOL/L
PROT SERPL-MCNC: 6.7 G/DL
RBC # BLD: 3.94 M/UL
RBC # BLD: 3.94 M/UL
RBC # FLD: 20.6 %
RBC BLD AUTO: NORMAL
RETICS # AUTO: 0.6 %
RETICS AGGREG/RBC NFR: 24.4 K/UL
SODIUM SERPL-SCNC: 139 MMOL/L
SPHEROCYTES BLD QL SMEAR: SLIGHT
TIBC SERPL-MCNC: 407 UG/DL
UIBC SERPL-MCNC: 388 UG/DL
VIT B12 SERPL-MCNC: 1143 PG/ML
WBC # FLD AUTO: 6.52 K/UL

## 2022-09-01 ENCOUNTER — APPOINTMENT (OUTPATIENT)
Dept: INFUSION THERAPY | Facility: CLINIC | Age: 87
End: 2022-09-01

## 2022-09-01 ENCOUNTER — OUTPATIENT (OUTPATIENT)
Dept: OUTPATIENT SERVICES | Facility: HOSPITAL | Age: 87
LOS: 1 days | End: 2022-09-01
Payer: MEDICARE

## 2022-09-01 VITALS
WEIGHT: 95.9 LBS | OXYGEN SATURATION: 98 % | SYSTOLIC BLOOD PRESSURE: 110 MMHG | RESPIRATION RATE: 17 BRPM | TEMPERATURE: 98 F | HEIGHT: 62 IN | DIASTOLIC BLOOD PRESSURE: 65 MMHG | HEART RATE: 63 BPM

## 2022-09-01 DIAGNOSIS — D50.9 IRON DEFICIENCY ANEMIA, UNSPECIFIED: ICD-10-CM

## 2022-09-01 PROCEDURE — 96365 THER/PROPH/DIAG IV INF INIT: CPT

## 2022-09-01 RX ORDER — FERUMOXYTOL 510 MG/17ML
510 INJECTION INTRAVENOUS ONCE
Refills: 0 | Status: COMPLETED | OUTPATIENT
Start: 2022-09-01 | End: 2022-09-01

## 2022-09-01 RX ADMIN — FERUMOXYTOL 117 MILLIGRAM(S): 510 INJECTION INTRAVENOUS at 09:24

## 2022-09-01 RX ADMIN — FERUMOXYTOL 510 MILLIGRAM(S): 510 INJECTION INTRAVENOUS at 10:25

## 2022-09-08 ENCOUNTER — APPOINTMENT (OUTPATIENT)
Dept: INFUSION THERAPY | Facility: CLINIC | Age: 87
End: 2022-09-08

## 2022-09-08 ENCOUNTER — OUTPATIENT (OUTPATIENT)
Dept: OUTPATIENT SERVICES | Facility: HOSPITAL | Age: 87
LOS: 1 days | End: 2022-09-08
Payer: MEDICARE

## 2022-09-08 VITALS
HEIGHT: 62 IN | RESPIRATION RATE: 17 BRPM | SYSTOLIC BLOOD PRESSURE: 113 MMHG | TEMPERATURE: 98 F | OXYGEN SATURATION: 97 % | DIASTOLIC BLOOD PRESSURE: 72 MMHG | WEIGHT: 95.9 LBS | HEART RATE: 66 BPM

## 2022-09-08 DIAGNOSIS — D50.9 IRON DEFICIENCY ANEMIA, UNSPECIFIED: ICD-10-CM

## 2022-09-08 PROCEDURE — 96365 THER/PROPH/DIAG IV INF INIT: CPT

## 2022-09-08 RX ORDER — FERUMOXYTOL 510 MG/17ML
510 INJECTION INTRAVENOUS ONCE
Refills: 0 | Status: COMPLETED | OUTPATIENT
Start: 2022-09-08 | End: 2022-09-08

## 2022-09-08 RX ADMIN — FERUMOXYTOL 117 MILLIGRAM(S): 510 INJECTION INTRAVENOUS at 09:40

## 2022-09-08 RX ADMIN — FERUMOXYTOL 510 MILLIGRAM(S): 510 INJECTION INTRAVENOUS at 10:40

## 2022-10-13 ENCOUNTER — APPOINTMENT (OUTPATIENT)
Dept: HEMATOLOGY ONCOLOGY | Facility: CLINIC | Age: 87
End: 2022-10-13

## 2022-10-13 VITALS
RESPIRATION RATE: 18 BRPM | DIASTOLIC BLOOD PRESSURE: 79 MMHG | WEIGHT: 85 LBS | BODY MASS INDEX: 15.64 KG/M2 | TEMPERATURE: 96.6 F | HEART RATE: 75 BPM | SYSTOLIC BLOOD PRESSURE: 96 MMHG | OXYGEN SATURATION: 98 % | HEIGHT: 62 IN

## 2022-10-13 LAB
RBC # BLD: 4.98 M/UL
RETICS # AUTO: 0.5 %
RETICS AGGREG/RBC NFR: 25.4 K/UL

## 2022-10-13 PROCEDURE — 36415 COLL VENOUS BLD VENIPUNCTURE: CPT

## 2022-10-13 PROCEDURE — 99213 OFFICE O/P EST LOW 20 MIN: CPT | Mod: 25

## 2022-10-13 NOTE — ASSESSMENT
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 Since last visit the patient was treated sending anemia and iron deficiency due to previous bouts of colitis.. She is feeling much better after 2 Feraheme infusions.  Have ordered CBC, manual differential,  reticulocyte count, CMP, iron panel, ferritin.Venipuncture was performed in the office today.  Additional iron infusions to be determined pending results. Patient was advised to call office to discuss results and next appointment date. \par \par  \par

## 2022-10-13 NOTE — REASON FOR VISIT
[Follow-Up Visit] : a follow-up visit for [FreeTextEntry2] : Deficiency anemia, anemia due to chronic blood loss, evaded liver enzymes, fatigue

## 2022-10-13 NOTE — HISTORY OF PRESENT ILLNESS
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  At the last visit, the hemoglobin had dropped to 9.6 likely due to GI blood loss as she has had in the past and with the so that of colitis that she had at the time .Iron saturation was low at 5% and serum iron was 19. Ferritin was low at 27. B12 and folate were normal.  Patient had another series of Feraheme infusions at the [facility] following this.  She states that she is feeling stronger and is able to do more since the iron infusions.  Her colitis has remained quiescent.  She denies blood in urine or stool.  She denies abdominal pain, shortness of breath, dizziness, fever, chills, sweats or recent infections.  Of note the patient has received her third COVID 19 booster (bivalent) and a flu vaccine.\par \par

## 2022-10-13 NOTE — REVIEW OF SYSTEMS
[Negative] : Allergic/Immunologic [Fever] : no fever [Chills] : no chills [Fatigue] : no fatigue [Recent Change In Weight] : ~T no recent weight change [Chest Pain] : no chest pain [Palpitations] : no palpitations [Shortness Of Breath] : no shortness of breath [Abdominal Pain] : no abdominal pain [Joint Pain] : no joint pain [Skin Rash] : no skin rash [Easy Bleeding] : no tendency for easy bleeding [Easy Bruising] : no tendency for easy bruising

## 2022-10-14 ENCOUNTER — NON-APPOINTMENT (OUTPATIENT)
Age: 87
End: 2022-10-14

## 2022-10-14 LAB
ALBUMIN SERPL ELPH-MCNC: 4.2 G/DL
ALP BLD-CCNC: 95 U/L
ALT SERPL-CCNC: 80 U/L
ANION GAP SERPL CALC-SCNC: 14 MMOL/L
AST SERPL-CCNC: 45 U/L
BASOPHILS # BLD AUTO: 0.06 K/UL
BASOPHILS # BLD AUTO: 0.06 K/UL
BASOPHILS NFR BLD AUTO: 0.9 %
BASOPHILS NFR BLD AUTO: 0.9 %
BILIRUB SERPL-MCNC: 0.2 MG/DL
BUN SERPL-MCNC: 16 MG/DL
CALCIUM SERPL-MCNC: 9.8 MG/DL
CHLORIDE SERPL-SCNC: 105 MMOL/L
CO2 SERPL-SCNC: 23 MMOL/L
CREAT SERPL-MCNC: 0.61 MG/DL
EGFR: 86 ML/MIN/1.73M2
EOSINOPHIL # BLD AUTO: 0.06 K/UL
EOSINOPHIL # BLD AUTO: 0.06 K/UL
EOSINOPHIL NFR BLD AUTO: 0.9 %
EOSINOPHIL NFR BLD AUTO: 0.9 %
FERRITIN SERPL-MCNC: 500 NG/ML
GIANT PLATELETS BLD QL SMEAR: PRESENT
GLUCOSE SERPL-MCNC: 87 MG/DL
HCT VFR BLD CALC: 40.2 %
HGB BLD-MCNC: 12.7 G/DL
HYPOCHROMIA BLD QL SMEAR: SLIGHT
IRON SATN MFR SERPL: 40 %
IRON SERPL-MCNC: 114 UG/DL
LYMPHOCYTES # BLD AUTO: 1.33 K/UL
LYMPHOCYTES # BLD AUTO: 1.33 K/UL
LYMPHOCYTES NFR BLD AUTO: 20.2 %
LYMPHOCYTES NFR BLD AUTO: 20.2 %
MAN DIFF?: NORMAL
MCHC RBC-ENTMCNC: 25.5 PG
MCHC RBC-ENTMCNC: 31.6 GM/DL
MCV RBC AUTO: 80.7 FL
MONOCYTES # BLD AUTO: 0.4 K/UL
MONOCYTES # BLD AUTO: 0.4 K/UL
MONOCYTES NFR BLD AUTO: 6.1 %
MONOCYTES NFR BLD AUTO: 6.1 %
MSMEAR: NORMAL
MYELOCYTES NFR BLD: 1.7 %
NEUTROPHILS # BLD AUTO: 4.61 K/UL
NEUTROPHILS # BLD AUTO: 4.61 K/UL
NEUTROPHILS NFR BLD AUTO: 69.3 %
NEUTROPHILS NFR BLD AUTO: 69.3 %
NEUTS BAND NFR BLD MANUAL: 0.9 %
PLAT MORPH BLD: ABNORMAL
PLATELET # BLD AUTO: 252 K/UL
POIKILOCYTOSIS BLD QL SMEAR: SLIGHT
POTASSIUM SERPL-SCNC: 4.2 MMOL/L
PROT SERPL-MCNC: 6.9 G/DL
RBC # BLD: 4.98 M/UL
RBC # FLD: NORMAL
RBC BLD AUTO: NORMAL
SCHISTOCYTES BLD QL SMEAR: SLIGHT
SMUDGE CELLS # BLD: PRESENT
SODIUM SERPL-SCNC: 142 MMOL/L
TIBC SERPL-MCNC: 286 UG/DL
UIBC SERPL-MCNC: 172 UG/DL
WBC # FLD AUTO: 6.57 K/UL

## 2022-11-30 ENCOUNTER — APPOINTMENT (OUTPATIENT)
Dept: HEMATOLOGY ONCOLOGY | Facility: CLINIC | Age: 87
End: 2022-11-30

## 2022-11-30 VITALS
HEIGHT: 62 IN | TEMPERATURE: 96.7 F | HEART RATE: 67 BPM | DIASTOLIC BLOOD PRESSURE: 62 MMHG | BODY MASS INDEX: 15.46 KG/M2 | WEIGHT: 84 LBS | OXYGEN SATURATION: 100 % | SYSTOLIC BLOOD PRESSURE: 122 MMHG

## 2022-11-30 DIAGNOSIS — E55.9 VITAMIN D DEFICIENCY, UNSPECIFIED: ICD-10-CM

## 2022-11-30 PROCEDURE — 36415 COLL VENOUS BLD VENIPUNCTURE: CPT

## 2022-11-30 PROCEDURE — 99213 OFFICE O/P EST LOW 20 MIN: CPT | Mod: 25

## 2022-11-30 NOTE — REVIEW OF SYSTEMS
[Fatigue] : fatigue [Negative] : Allergic/Immunologic [Fever] : no fever [Chills] : no chills [Night Sweats] : no night sweats [Recent Change In Weight] : ~T no recent weight change [Chest Pain] : no chest pain [Palpitations] : no palpitations [Shortness Of Breath] : no shortness of breath [SOB on Exertion] : no shortness of breath during exertion [Abdominal Pain] : no abdominal pain [Joint Pain] : no joint pain [Joint Stiffness] : no joint stiffness [Easy Bleeding] : no tendency for easy bleeding [Easy Bruising] : no tendency for easy bruising [FreeTextEntry2] : Less tired

## 2022-11-30 NOTE — REASON FOR VISIT
[Follow-Up Visit] : a follow-up visit for [FreeTextEntry2] : Iron deficiency anemia, iron deficiency anemia due to chronic blood loss, elevated liver enzymes, fatigue, vitamin D deficiency, elevated liver enzymes

## 2022-11-30 NOTE — HISTORY OF PRESENT ILLNESS
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 Blood results from the last visit revealed that the hemoglobin was much improved at 12.7. The iron profile was normal and the ferritin was increased to 500, reflecting recent Feraheme infusions. Metabolic panel was significant for a slight increase in the liver function test which patient has chronically. Today, the patient complains of unintentional weight loss of about 4 pounds in a few months but is otherwise generally well without any acute complaints.  And saw her PCP recently, Dr. Lee, who requests that a TSH be checked.  Patient states that she is feeling stronger after the iron infusions and stays active by walking frequently. Her colitis has remained quiescent. Denies fever, chills, sweats, hematuria, hematochezia, tarry stool, joint pain, abdominal pain, shortness of breath, dizziness, or recent infections.

## 2022-11-30 NOTE — ASSESSMENT
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 Patient is feeling much better after the last 2 Feraheme infusions. Have ordered B12 and folate, CBC with differential, CMP, ferritin, iron panel, manual differential, reticulocyte count, TSH, and Vitamin D. Venipuncture was performed in the office today. Additional iron infusions to be determined pending results. Patient was advised to call office to discuss results and next appointment date.

## 2022-11-30 NOTE — PHYSICAL EXAM
[Thin] : thin [Normal] : affect appropriate [de-identified] : bilateral hearing aids [de-identified] : RRR, S1, S2, ?murmur, no rubs or gallops [de-identified] : multiple surgical scars [de-identified] : s/p left patellar fracture,  s/p right patellar fracture, s/p left fifth finger fracture, s/p right elbow fracture,  s/p left wrist fracture [de-identified] : multiple scars on abdomen,  multiple senile keratoses, scar right knee, scar left knee, soft tissue mass 2 cm on scalp unchanged

## 2022-12-01 ENCOUNTER — NON-APPOINTMENT (OUTPATIENT)
Age: 87
End: 2022-12-01

## 2022-12-01 LAB
25(OH)D3 SERPL-MCNC: 35.7 NG/ML
ALBUMIN SERPL ELPH-MCNC: 4.5 G/DL
ALP BLD-CCNC: 103 U/L
ALT SERPL-CCNC: 86 U/L
ANION GAP SERPL CALC-SCNC: 12 MMOL/L
AST SERPL-CCNC: 66 U/L
BASOPHILS # BLD AUTO: 0 K/UL
BASOPHILS # BLD AUTO: 0 K/UL
BASOPHILS NFR BLD AUTO: 0 %
BASOPHILS NFR BLD AUTO: 0 %
BILIRUB SERPL-MCNC: 0.3 MG/DL
BUN SERPL-MCNC: 13 MG/DL
CALCIUM SERPL-MCNC: 10.1 MG/DL
CHLORIDE SERPL-SCNC: 106 MMOL/L
CO2 SERPL-SCNC: 24 MMOL/L
CREAT SERPL-MCNC: 0.54 MG/DL
EGFR: 89 ML/MIN/1.73M2
EOSINOPHIL # BLD AUTO: 0.14 K/UL
EOSINOPHIL # BLD AUTO: 0.14 K/UL
EOSINOPHIL NFR BLD AUTO: 1.8 %
EOSINOPHIL NFR BLD AUTO: 1.8 %
FERRITIN SERPL-MCNC: 367 NG/ML
FOLATE SERPL-MCNC: >20 NG/ML
GIANT PLATELETS BLD QL SMEAR: PRESENT
GLUCOSE SERPL-MCNC: 75 MG/DL
HCT VFR BLD CALC: 42.7 %
HGB BLD-MCNC: 13.5 G/DL
IRON SATN MFR SERPL: 35 %
IRON SERPL-MCNC: 110 UG/DL
LYMPHOCYTES # BLD AUTO: 1.46 K/UL
LYMPHOCYTES # BLD AUTO: 1.46 K/UL
LYMPHOCYTES NFR BLD AUTO: 18.6 %
LYMPHOCYTES NFR BLD AUTO: 18.6 %
MAN DIFF?: NORMAL
MCHC RBC-ENTMCNC: 29.1 PG
MCHC RBC-ENTMCNC: 31.6 GM/DL
MCV RBC AUTO: 92 FL
MONOCYTES # BLD AUTO: 0.76 K/UL
MONOCYTES # BLD AUTO: 0.76 K/UL
MONOCYTES NFR BLD AUTO: 9.7 %
MONOCYTES NFR BLD AUTO: 9.7 %
MSMEAR: NORMAL
NEUTROPHILS # BLD AUTO: 5.49 K/UL
NEUTROPHILS # BLD AUTO: 5.49 K/UL
NEUTROPHILS NFR BLD AUTO: 69.9 %
NEUTROPHILS NFR BLD AUTO: 69.9 %
PLAT MORPH BLD: ABNORMAL
PLATELET # BLD AUTO: 317 K/UL
POTASSIUM SERPL-SCNC: 4.6 MMOL/L
PROT SERPL-MCNC: 7.4 G/DL
RBC # BLD: 4.64 M/UL
RBC # BLD: 4.64 M/UL
RBC # FLD: 19.7 %
RBC BLD AUTO: NORMAL
RETICS # AUTO: 1.3 %
RETICS AGGREG/RBC NFR: 62.2 K/UL
SODIUM SERPL-SCNC: 142 MMOL/L
TIBC SERPL-MCNC: 312 UG/DL
TSH SERPL-ACNC: 2.89 UIU/ML
UIBC SERPL-MCNC: 201 UG/DL
VIT B12 SERPL-MCNC: 1208 PG/ML
WBC # FLD AUTO: 7.86 K/UL

## 2023-01-11 ENCOUNTER — APPOINTMENT (OUTPATIENT)
Dept: HEMATOLOGY ONCOLOGY | Facility: CLINIC | Age: 88
End: 2023-01-11
Payer: MEDICARE

## 2023-01-11 VITALS
DIASTOLIC BLOOD PRESSURE: 68 MMHG | HEART RATE: 64 BPM | TEMPERATURE: 96.9 F | SYSTOLIC BLOOD PRESSURE: 100 MMHG | WEIGHT: 86 LBS | BODY MASS INDEX: 15.83 KG/M2 | HEIGHT: 62 IN | OXYGEN SATURATION: 100 %

## 2023-01-11 PROCEDURE — 36415 COLL VENOUS BLD VENIPUNCTURE: CPT

## 2023-01-11 PROCEDURE — 99213 OFFICE O/P EST LOW 20 MIN: CPT | Mod: 25

## 2023-01-11 NOTE — REASON FOR VISIT
[Follow-Up Visit] : a follow-up visit for [FreeTextEntry2] : Anemia of chronic blood loss, iron deficiency anemia, fatigue, elevated liver enzymes\par  Iron deficiency anemia, iron deficiency anemia due to chronic blood loss, elevated liver enzymes, fatigue,  elevated liver enzymes.

## 2023-01-11 NOTE — HISTORY OF PRESENT ILLNESS
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 Blood results from the last visit revealed the hemoglobin  excellent at 13.5. The iron panel was normal and the ferritin is 367, reflecting the fairly recent iron infusions. The liver chemistries were mildly elevated but not anymore than usual. Vitamin D level was at the low end of normal. TSH, B12, folate were normal.  She has been feeling depressed at the 1 year anniversary of the death of her son but otherwise generally well without any acute complaints.  Patient states that she is feeling stronger after the iron infusions and stays active by walking frequently. Her colitis has remained quiescent and she has not had bouts of diarrhea. Denies fever, chills, sweats, hematuria, hematochezia, tarry stool, joint pain, abdominal pain, shortness of breath, dizziness, or recent infections. \par \par

## 2023-01-11 NOTE — ASSESSMENT
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 Patient is feeling much better after the last 2 Feraheme infusions. Have ordered , CBC with differential, CMP, ferritin, iron panel, manual differential, reticulocyte count.  Venipuncture was performed in the office today. Additional iron infusions to be determined pending results. Patient was advised to call office to discuss results and next appointment date.

## 2023-01-11 NOTE — REVIEW OF SYSTEMS
[Fatigue] : fatigue [Easy Bruising] : a tendency for easy bruising [Negative] : Allergic/Immunologic [Fever] : no fever [Chills] : no chills [Night Sweats] : no night sweats [Recent Change In Weight] : ~T no recent weight change [Chest Pain] : no chest pain [Palpitations] : no palpitations [Shortness Of Breath] : no shortness of breath [Abdominal Pain] : no abdominal pain [Diarrhea] : no diarrhea [Joint Pain] : no joint pain [Joint Stiffness] : no joint stiffness [Skin Rash] : no skin rash
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
## 2023-01-12 ENCOUNTER — NON-APPOINTMENT (OUTPATIENT)
Age: 88
End: 2023-01-12

## 2023-01-12 LAB
ALBUMIN SERPL ELPH-MCNC: 4.4 G/DL
ALP BLD-CCNC: 97 U/L
ALT SERPL-CCNC: 69 U/L
ANION GAP SERPL CALC-SCNC: 11 MMOL/L
AST SERPL-CCNC: 53 U/L
BASOPHILS # BLD AUTO: 0.08 K/UL
BASOPHILS # BLD AUTO: 0.08 K/UL
BASOPHILS NFR BLD AUTO: 0.9 %
BASOPHILS NFR BLD AUTO: 0.9 %
BILIRUB SERPL-MCNC: 0.3 MG/DL
BUN SERPL-MCNC: 17 MG/DL
CALCIUM SERPL-MCNC: 10.3 MG/DL
CHLORIDE SERPL-SCNC: 103 MMOL/L
CO2 SERPL-SCNC: 27 MMOL/L
CREAT SERPL-MCNC: 0.56 MG/DL
EGFR: 88 ML/MIN/1.73M2
EOSINOPHIL # BLD AUTO: 0.15 K/UL
EOSINOPHIL # BLD AUTO: 0.15 K/UL
EOSINOPHIL NFR BLD AUTO: 1.8 %
EOSINOPHIL NFR BLD AUTO: 1.8 %
FERRITIN SERPL-MCNC: 297 NG/ML
GLUCOSE SERPL-MCNC: 86 MG/DL
HCT VFR BLD CALC: 43.1 %
HGB BLD-MCNC: 13.9 G/DL
IRON SATN MFR SERPL: 34 %
IRON SERPL-MCNC: 121 UG/DL
LYMPHOCYTES # BLD AUTO: 1.42 K/UL
LYMPHOCYTES # BLD AUTO: 1.42 K/UL
LYMPHOCYTES NFR BLD AUTO: 16.8 %
LYMPHOCYTES NFR BLD AUTO: 16.8 %
MAN DIFF?: NORMAL
MCHC RBC-ENTMCNC: 31.1 PG
MCHC RBC-ENTMCNC: 32.3 GM/DL
MCV RBC AUTO: 96.4 FL
MONOCYTES # BLD AUTO: 0.67 K/UL
MONOCYTES # BLD AUTO: 0.67 K/UL
MONOCYTES NFR BLD AUTO: 7.9 %
MONOCYTES NFR BLD AUTO: 7.9 %
MSMEAR: NORMAL
NEUTROPHILS # BLD AUTO: 6.16 K/UL
NEUTROPHILS # BLD AUTO: 6.16 K/UL
NEUTROPHILS NFR BLD AUTO: 72.6 %
NEUTROPHILS NFR BLD AUTO: 72.6 %
PLAT MORPH BLD: NORMAL
PLATELET # BLD AUTO: 296 K/UL
POTASSIUM SERPL-SCNC: 5 MMOL/L
PROT SERPL-MCNC: 7.2 G/DL
RBC # BLD: 4.47 M/UL
RBC # BLD: 4.47 M/UL
RBC # FLD: 14 %
RBC BLD AUTO: NORMAL
RETICS # AUTO: 1.3 %
RETICS AGGREG/RBC NFR: 58.1 K/UL
SODIUM SERPL-SCNC: 141 MMOL/L
TIBC SERPL-MCNC: 351 UG/DL
UIBC SERPL-MCNC: 230 UG/DL
WBC # FLD AUTO: 8.48 K/UL

## 2023-03-07 ENCOUNTER — NON-APPOINTMENT (OUTPATIENT)
Age: 88
End: 2023-03-07

## 2023-03-08 ENCOUNTER — APPOINTMENT (OUTPATIENT)
Dept: HEMATOLOGY ONCOLOGY | Facility: CLINIC | Age: 88
End: 2023-03-08
Payer: MEDICARE

## 2023-03-08 VITALS
BODY MASS INDEX: 15.83 KG/M2 | TEMPERATURE: 96.3 F | DIASTOLIC BLOOD PRESSURE: 60 MMHG | HEART RATE: 76 BPM | OXYGEN SATURATION: 95 % | WEIGHT: 86 LBS | SYSTOLIC BLOOD PRESSURE: 122 MMHG | HEIGHT: 62 IN

## 2023-03-08 DIAGNOSIS — T30.0 BURN OF UNSPECIFIED BODY REGION, UNSPECIFIED DEGREE: ICD-10-CM

## 2023-03-08 LAB
RBC # BLD: 4.45 M/UL
RETICS # AUTO: 1.2 %
RETICS AGGREG/RBC NFR: 53 K/UL

## 2023-03-08 PROCEDURE — 99214 OFFICE O/P EST MOD 30 MIN: CPT | Mod: 25

## 2023-03-08 PROCEDURE — 36415 COLL VENOUS BLD VENIPUNCTURE: CPT
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
## 2023-03-09 LAB
ALBUMIN SERPL ELPH-MCNC: 4.3 G/DL
ALP BLD-CCNC: 98 U/L
ALT SERPL-CCNC: 59 U/L
ANION GAP SERPL CALC-SCNC: 12 MMOL/L
AST SERPL-CCNC: 43 U/L
BASOPHILS # BLD AUTO: 0.12 K/UL
BASOPHILS # BLD AUTO: 0.12 K/UL
BASOPHILS NFR BLD AUTO: 1.7 %
BASOPHILS NFR BLD AUTO: 1.7 %
BILIRUB SERPL-MCNC: 0.2 MG/DL
BUN SERPL-MCNC: 12 MG/DL
CALCIUM SERPL-MCNC: 10.3 MG/DL
CHLORIDE SERPL-SCNC: 107 MMOL/L
CO2 SERPL-SCNC: 23 MMOL/L
CREAT SERPL-MCNC: 0.53 MG/DL
EGFR: 89 ML/MIN/1.73M2
EOSINOPHIL # BLD AUTO: 0.3 K/UL
EOSINOPHIL # BLD AUTO: 0.3 K/UL
EOSINOPHIL NFR BLD AUTO: 4.3 %
EOSINOPHIL NFR BLD AUTO: 4.3 %
ERYTHROCYTE [SEDIMENTATION RATE] IN BLOOD BY WESTERGREN METHOD: 31 MM/HR
FERRITIN SERPL-MCNC: 254 NG/ML
FOLATE SERPL-MCNC: >20 NG/ML
GIANT PLATELETS BLD QL SMEAR: PRESENT
GLUCOSE SERPL-MCNC: 79 MG/DL
HCT VFR BLD CALC: 41.7 %
HGB BLD-MCNC: 13.7 G/DL
IRON SATN MFR SERPL: 19 %
IRON SERPL-MCNC: 57 UG/DL
LYMPHOCYTES # BLD AUTO: 1.15 K/UL
LYMPHOCYTES # BLD AUTO: 1.15 K/UL
LYMPHOCYTES NFR BLD AUTO: 16.5 %
LYMPHOCYTES NFR BLD AUTO: 16.5 %
MAN DIFF?: NORMAL
MCHC RBC-ENTMCNC: 30.8 PG
MCHC RBC-ENTMCNC: 32.9 GM/DL
MCV RBC AUTO: 93.7 FL
MONOCYTES # BLD AUTO: 0.67 K/UL
MONOCYTES # BLD AUTO: 0.67 K/UL
MONOCYTES NFR BLD AUTO: 9.6 %
MONOCYTES NFR BLD AUTO: 9.6 %
MSMEAR: NORMAL
NEUTROPHILS # BLD AUTO: 4.71 K/UL
NEUTROPHILS # BLD AUTO: 4.71 K/UL
NEUTROPHILS NFR BLD AUTO: 67 %
NEUTROPHILS NFR BLD AUTO: 67 %
NEUTS BAND NFR BLD MANUAL: 0.9 %
PLAT MORPH BLD: NORMAL
PLATELET # BLD AUTO: 263 K/UL
POTASSIUM SERPL-SCNC: 4.4 MMOL/L
PROT SERPL-MCNC: 7.1 G/DL
RBC # BLD: 4.45 M/UL
RBC # FLD: 13.9 %
RBC BLD AUTO: NORMAL
SODIUM SERPL-SCNC: 142 MMOL/L
TIBC SERPL-MCNC: 306 UG/DL
UIBC SERPL-MCNC: 249 UG/DL
VIT B12 SERPL-MCNC: 1262 PG/ML
WBC # FLD AUTO: 6.94 K/UL

## 2023-03-14 ENCOUNTER — EMERGENCY (EMERGENCY)
Facility: HOSPITAL | Age: 88
LOS: 1 days | Discharge: HOME | End: 2023-03-14
Admitting: STUDENT IN AN ORGANIZED HEALTH CARE EDUCATION/TRAINING PROGRAM
Payer: MEDICARE

## 2023-03-14 VITALS
HEART RATE: 75 BPM | OXYGEN SATURATION: 97 % | WEIGHT: 85.1 LBS | HEIGHT: 60 IN | RESPIRATION RATE: 16 BRPM | SYSTOLIC BLOOD PRESSURE: 144 MMHG | TEMPERATURE: 98 F | DIASTOLIC BLOOD PRESSURE: 76 MMHG

## 2023-03-14 PROCEDURE — 99282 EMERGENCY DEPT VISIT SF MDM: CPT

## 2023-03-14 NOTE — ED PROVIDER NOTE - NSFOLLOWUPINSTRUCTIONS_ED_ALL_ED_FT
Keep wrap / dressing on the arm until tomorrow.   Remove tomorrow.     Follow up with your primary care doctor within 1 week for continued evaluation.     Return to the emergency department for any new or worsening symptoms including continued bleeding, redness coming from the area, excessive bruising or any other concerns.

## 2023-03-14 NOTE — ED PROVIDER NOTE - PATIENT PORTAL LINK FT
You can access the FollowMyHealth Patient Portal offered by [facility] by registering at the following website: [email or web address]. By joining [facility]’s FollowMyHealth portal, you will also be able to view your health information using other applications (apps) compatible with our system.

## 2023-03-14 NOTE — ED ADULT NURSE NOTE - NSIMPLEMENTINTERV_GEN_ALL_ED
Implemented All Fall with Harm Risk Interventions:  [place] to call system. Call bell, personal items and telephone within reach. Instruct patient to call for assistance. Room bathroom lighting operational. Non-slip footwear when patient is off stretcher. Physically safe environment: no spills, clutter or unnecessary equipment. Stretcher in lowest position, wheels locked, appropriate side rails in place. Provide visual cue, wrist band, yellow gown, etc. Monitor gait and stability. Monitor for mental status changes and reorient to person, place, and time. Review medications for side effects contributing to fall risk. Reinforce activity limits and safety measures with patient and family. Provide visual clues: red socks.
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
## 2023-03-14 NOTE — ED ADULT TRIAGE NOTE - CHIEF COMPLAINT QUOTE
Benefits, risks, and possible complications of procedure explained to patient/caregiver who verbalized understanding and gave verbal consent. Pt BIBEMS for med eval. As per EMS, "She was discharged from [facility] today and they left her IV in and she tried to take it out and she wouldn't stop bleeding." Denies any other sx. Bleeding controlled.

## 2023-03-15 ENCOUNTER — NON-APPOINTMENT (OUTPATIENT)
Age: 88
End: 2023-03-15
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
## 2023-03-16 DIAGNOSIS — Z86.39 PERSONAL HISTORY OF OTHER ENDOCRINE, NUTRITIONAL AND METABOLIC DISEASE: ICD-10-CM

## 2023-03-16 DIAGNOSIS — Z91.011 ALLERGY TO MILK PRODUCTS: ICD-10-CM
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
## 2023-03-16 DIAGNOSIS — Z88.2 ALLERGY STATUS TO SULFONAMIDES: ICD-10-CM

## 2023-03-16 DIAGNOSIS — Z87.19 PERSONAL HISTORY OF OTHER DISEASES OF THE DIGESTIVE SYSTEM: ICD-10-CM

## 2023-03-16 DIAGNOSIS — S51.031A PUNCTURE WOUND WITHOUT FOREIGN BODY OF RIGHT ELBOW, INITIAL ENCOUNTER: ICD-10-CM

## 2023-03-16 DIAGNOSIS — Z88.0 ALLERGY STATUS TO PENICILLIN: ICD-10-CM

## 2023-03-16 DIAGNOSIS — Z86.2 PERSONAL HISTORY OF DISEASES OF THE BLOOD AND BLOOD-FORMING ORGANS AND CERTAIN DISORDERS INVOLVING THE IMMUNE MECHANISM: ICD-10-CM

## 2023-04-26 ENCOUNTER — APPOINTMENT (OUTPATIENT)
Dept: HEMATOLOGY ONCOLOGY | Facility: CLINIC | Age: 88
End: 2023-04-26
Payer: MEDICARE

## 2023-04-26 ENCOUNTER — LABORATORY RESULT (OUTPATIENT)
Age: 88
End: 2023-04-26

## 2023-04-26 VITALS
HEIGHT: 62 IN | TEMPERATURE: 96.9 F | WEIGHT: 87 LBS | SYSTOLIC BLOOD PRESSURE: 122 MMHG | DIASTOLIC BLOOD PRESSURE: 70 MMHG | OXYGEN SATURATION: 98 % | BODY MASS INDEX: 16.01 KG/M2 | HEART RATE: 81 BPM

## 2023-04-26 LAB
ERYTHROCYTE [SEDIMENTATION RATE] IN BLOOD BY WESTERGREN METHOD: 13 MM/HR
RBC # BLD: 4.66 M/UL
RETICS # AUTO: 1.3 %
RETICS AGGREG/RBC NFR: 62 K/UL

## 2023-04-26 PROCEDURE — 99213 OFFICE O/P EST LOW 20 MIN: CPT | Mod: 25

## 2023-04-26 PROCEDURE — 36415 COLL VENOUS BLD VENIPUNCTURE: CPT
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
## 2023-04-26 NOTE — REVIEW OF SYSTEMS
[Fatigue] : fatigue [Negative] : Allergic/Immunologic [Fever] : no fever [Chills] : no chills [Night Sweats] : no night sweats [Recent Change In Weight] : ~T no recent weight change [Vision Problems] : no vision problems [Nosebleeds] : no nosebleeds [Chest Pain] : no chest pain [Shortness Of Breath] : no shortness of breath [Abdominal Pain] : no abdominal pain [Joint Pain] : no joint pain [Joint Stiffness] : no joint stiffness [Skin Rash] : no skin rash [Muscle Weakness] : no muscle weakness [Easy Bleeding] : no tendency for easy bleeding [Easy Bruising] : no tendency for easy bruising [Swollen Glands] : no swollen glands [FreeTextEntry2] : Has no energy

## 2023-04-26 NOTE — ASSESSMENT
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 Patient has been stable thus far after the last 2 Feraheme infusions in September.  Exam today was significant for occasional ectopic beats which may be contributing to patient's fatigue and she was advised to discuss this at her next appointment with Dr. Lee, her PCP.  Have ordered B12 and folate, CBC with differential, CMP, ferritin, iron panel, manual differential, reticulocyte count and sed rate. Venipuncture was performed in the office today. Additional iron infusions to be determined pending results. Patient was advised to call office to discuss results and next appointment date.

## 2023-04-26 NOTE — REASON FOR VISIT
[Follow-Up Visit] : a follow-up visit for [FreeTextEntry2] : Anemia, iron deficiency anemia, anemia of chronic blood loss, elevated B12 level, fatigue

## 2023-04-27 LAB
ALBUMIN SERPL ELPH-MCNC: 4.4 G/DL
ALP BLD-CCNC: 87 U/L
ALT SERPL-CCNC: 90 U/L
ANION GAP SERPL CALC-SCNC: 13 MMOL/L
AST SERPL-CCNC: 54 U/L
BASOPHILS # BLD AUTO: 0.11 K/UL
BASOPHILS # BLD AUTO: 0.11 K/UL
BASOPHILS NFR BLD AUTO: 1.7 %
BASOPHILS NFR BLD AUTO: 1.7 %
BILIRUB SERPL-MCNC: 0.4 MG/DL
BLASTS # FLD: 0 %
BUN SERPL-MCNC: 18 MG/DL
CALCIUM SERPL-MCNC: 10.4 MG/DL
CHLORIDE SERPL-SCNC: 101 MMOL/L
CO2 SERPL-SCNC: 26 MMOL/L
CREAT SERPL-MCNC: 0.6 MG/DL
EGFR: 86 ML/MIN/1.73M2
EOSINOPHIL # BLD AUTO: 0 K/UL
EOSINOPHIL # BLD AUTO: 0 K/UL
EOSINOPHIL NFR BLD AUTO: 0 %
EOSINOPHIL NFR BLD AUTO: 0 %
FERRITIN SERPL-MCNC: 145 NG/ML
FOLATE SERPL-MCNC: 15.7 NG/ML
GLUCOSE SERPL-MCNC: 81 MG/DL
HCT VFR BLD CALC: 44.1 %
HGB BLD-MCNC: 14.5 G/DL
IRON SATN MFR SERPL: 26 %
IRON SERPL-MCNC: 88 UG/DL
LYMPHOCYTES # BLD AUTO: 0.86 K/UL
LYMPHOCYTES # BLD AUTO: 0.86 K/UL
LYMPHOCYTES NFR BLD AUTO: 17 %
LYMPHOCYTES NFR BLD AUTO: 17 %
MAN DIFF?: YES
MCHC RBC-ENTMCNC: 31.1 PG
MCHC RBC-ENTMCNC: 32.9 GM/DL
MCV RBC AUTO: 94.6 FL
MONOCYTES # BLD AUTO: 0.46 K/UL
MONOCYTES # BLD AUTO: 0.46 K/UL
MONOCYTES NFR BLD AUTO: 7 %
MONOCYTES NFR BLD AUTO: 7 %
MSMEAR: NORMAL
NEUTROPHILS # BLD AUTO: 4.88 K/UL
NEUTROPHILS # BLD AUTO: 4.88 K/UL
NEUTROPHILS NFR BLD AUTO: 74 %
NEUTROPHILS NFR BLD AUTO: 74 %
PLAT MORPH BLD: ABNORMAL
PLATELET # BLD AUTO: 265 K/UL
POTASSIUM SERPL-SCNC: 4.3 MMOL/L
PROT SERPL-MCNC: 7 G/DL
RBC # BLD: 4.66 M/UL
RBC # FLD: 14.7 %
RBC BLD AUTO: NORMAL
SODIUM SERPL-SCNC: 140 MMOL/L
TIBC SERPL-MCNC: 341 UG/DL
UIBC SERPL-MCNC: 253 UG/DL
VARIANT LYMPHS # BLD MANUAL: 3.5 %
VIT B12 SERPL-MCNC: 1155 PG/ML
WBC # FLD AUTO: 6.6 K/UL

## 2023-04-28 ENCOUNTER — NON-APPOINTMENT (OUTPATIENT)
Age: 88
End: 2023-04-28

## 2023-06-14 ENCOUNTER — APPOINTMENT (OUTPATIENT)
Dept: HEMATOLOGY ONCOLOGY | Facility: CLINIC | Age: 88
End: 2023-06-14

## 2023-07-11 ENCOUNTER — APPOINTMENT (OUTPATIENT)
Dept: HEMATOLOGY ONCOLOGY | Facility: CLINIC | Age: 88
End: 2023-07-11
Payer: MEDICARE

## 2023-07-11 VITALS
DIASTOLIC BLOOD PRESSURE: 64 MMHG | HEIGHT: 62 IN | HEART RATE: 79 BPM | OXYGEN SATURATION: 98 % | BODY MASS INDEX: 14.72 KG/M2 | SYSTOLIC BLOOD PRESSURE: 122 MMHG | WEIGHT: 80 LBS | TEMPERATURE: 96.9 F

## 2023-07-11 LAB
RBC # BLD: 4.32 M/UL
RETICS # AUTO: 1.5 %
RETICS AGGREG/RBC NFR: 66.5 K/UL

## 2023-07-11 PROCEDURE — 36415 COLL VENOUS BLD VENIPUNCTURE: CPT

## 2023-07-11 PROCEDURE — 99213 OFFICE O/P EST LOW 20 MIN: CPT | Mod: 25

## 2023-07-11 RX ORDER — CHOLESTYRAMINE 4 G/9G
POWDER, FOR SUSPENSION ORAL
Refills: 0 | Status: DISCONTINUED | COMMUNITY
End: 2023-07-11

## 2023-07-11 RX ORDER — CHOLESTYRAMINE 4 G/9G
POWDER, FOR SUSPENSION ORAL
Refills: 0 | Status: ACTIVE | COMMUNITY

## 2023-07-11 NOTE — REVIEW OF SYSTEMS
[Fatigue] : fatigue [Loss of Hearing] : loss of hearing [Negative] : Allergic/Immunologic [Fever] : no fever [Night Sweats] : no night sweats [Recent Change In Weight] : ~T no recent weight change [Chest Pain] : no chest pain [Shortness Of Breath] : no shortness of breath [Cough] : no cough [Abdominal Pain] : no abdominal pain [Joint Pain] : no joint pain [Skin Rash] : no skin rash [Dizziness] : no dizziness [Easy Bleeding] : no tendency for easy bleeding [Easy Bruising] : no tendency for easy bruising [FreeTextEntry2] : Lost 7 pounds [FreeTextEntry3] : Saw ophthalmologist today, vision is stable [FreeTextEntry4] : Bilateral hearing aids [de-identified] : rarely diarrhea. Bowel movements regular

## 2023-07-11 NOTE — HISTORY OF PRESENT ILLNESS
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 At the last visit in April and since the last Feraheme infusions (September 2022) , the CBC was excellent with a hemoglobin of 14.5 and a hematocrit of 44.1. Sed rate was normal at 13, iron studies revealed a saturation that was normal at 26% and the ferritin was 145. B12 and folate were normal. Comprehensive metabolic panel was remarkable only for mild elevation in the AST and the ALT as patient has had chronically. Since last visit, the patient has lost 7 pounds but states that she has an appetite and has felt well.  She has rarely had any diarrhea and now takes her Lomotil on rare occasion when needed.  She visited her ophthalmologist today and states that her vision and eye findings are stable.  Today, she has no acute complaints. Patient stays active by walking.  Denies fever, chills, sweats, hematuria, hematochezia, tarry stool, joint pain, abdominal pain, shortness of breath, dizziness, or recent infections.

## 2023-07-11 NOTE — ASSESSMENT
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 Patient has been stable thus far after the last 2 Feraheme infusions in September. Have ordered CMP, ferritin, iron panel, CBC with differential, manual differential, reticulocyte count and sed rate. Venipuncture was performed in the office today. Additional iron infusions to be determined pending results. Patient was advised to call office to discuss results and further management. \par
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
## 2023-07-12 ENCOUNTER — NON-APPOINTMENT (OUTPATIENT)
Age: 88
End: 2023-07-12

## 2023-07-12 LAB
ALBUMIN SERPL ELPH-MCNC: 4.4 G/DL
ALP BLD-CCNC: 87 U/L
ALT SERPL-CCNC: 58 U/L
ANION GAP SERPL CALC-SCNC: 12 MMOL/L
AST SERPL-CCNC: 35 U/L
BASOPHILS # BLD AUTO: 0.11 K/UL
BASOPHILS NFR BLD AUTO: 1.7 %
BILIRUB SERPL-MCNC: 0.3 MG/DL
BUN SERPL-MCNC: 13 MG/DL
BURR CELLS BLD QL SMEAR: PRESENT
CALCIUM SERPL-MCNC: 9.8 MG/DL
CHLORIDE SERPL-SCNC: 107 MMOL/L
CO2 SERPL-SCNC: 23 MMOL/L
CREAT SERPL-MCNC: 0.62 MG/DL
EGFR: 86 ML/MIN/1.73M2
EOSINOPHIL # BLD AUTO: 0.17 K/UL
EOSINOPHIL NFR BLD AUTO: 2.6 %
ERYTHROCYTE [SEDIMENTATION RATE] IN BLOOD BY WESTERGREN METHOD: 28 MM/HR
FERRITIN SERPL-MCNC: 140 NG/ML
GIANT PLATELETS BLD QL SMEAR: PRESENT
GLUCOSE SERPL-MCNC: 80 MG/DL
IRON SATN MFR SERPL: 18 %
IRON SERPL-MCNC: 58 UG/DL
LYMPHOCYTES # BLD AUTO: 1.07 K/UL
LYMPHOCYTES NFR BLD AUTO: 16.7 %
MONOCYTES # BLD AUTO: 0.34 K/UL
MONOCYTES NFR BLD AUTO: 5.3 %
MSMEAR: NORMAL
NEUTROPHILS # BLD AUTO: 4.72 K/UL
NEUTROPHILS NFR BLD AUTO: 73.7 %
OVALOCYTES BLD QL SMEAR: SLIGHT
PLAT MORPH BLD: ABNORMAL
POIKILOCYTOSIS BLD QL SMEAR: SLIGHT
POTASSIUM SERPL-SCNC: 4.3 MMOL/L
PROT SERPL-MCNC: 7 G/DL
RBC BLD AUTO: ABNORMAL
SODIUM SERPL-SCNC: 142 MMOL/L
TIBC SERPL-MCNC: 326 UG/DL
UIBC SERPL-MCNC: 268 UG/DL

## 2023-09-12 ENCOUNTER — APPOINTMENT (OUTPATIENT)
Dept: HEMATOLOGY ONCOLOGY | Facility: CLINIC | Age: 88
End: 2023-09-12
Payer: MEDICARE

## 2023-09-12 VITALS
WEIGHT: 80 LBS | OXYGEN SATURATION: 97 % | SYSTOLIC BLOOD PRESSURE: 130 MMHG | DIASTOLIC BLOOD PRESSURE: 78 MMHG | BODY MASS INDEX: 14.72 KG/M2 | HEIGHT: 62 IN | HEART RATE: 79 BPM | TEMPERATURE: 96.3 F

## 2023-09-12 PROCEDURE — 36415 COLL VENOUS BLD VENIPUNCTURE: CPT

## 2023-09-12 PROCEDURE — 99213 OFFICE O/P EST LOW 20 MIN: CPT | Mod: 25

## 2023-09-12 RX ORDER — BACITRACIN 500 UNIT/G
OINTMENT (GRAM) TOPICAL
Refills: 0 | Status: ACTIVE | COMMUNITY

## 2023-09-12 RX ORDER — MINOXIDIL 5 %
5 SOLUTION, NON-ORAL TOPICAL
Refills: 0 | Status: DISCONTINUED | COMMUNITY
End: 2023-09-12

## 2023-09-13 ENCOUNTER — NON-APPOINTMENT (OUTPATIENT)
Age: 88
End: 2023-09-13

## 2023-09-13 LAB
ALBUMIN SERPL ELPH-MCNC: 4.5 G/DL
ALP BLD-CCNC: 84 U/L
ALT SERPL-CCNC: 80 U/L
ANION GAP SERPL CALC-SCNC: 11 MMOL/L
AST SERPL-CCNC: 58 U/L
BASOPHILS # BLD AUTO: 0.06 K/UL
BASOPHILS # BLD AUTO: 0.06 K/UL
BASOPHILS NFR BLD AUTO: 0.9 %
BASOPHILS NFR BLD AUTO: 0.9 %
BILIRUB SERPL-MCNC: 0.4 MG/DL
BUN SERPL-MCNC: 13 MG/DL
CALCIUM SERPL-MCNC: 9.9 MG/DL
CHLORIDE SERPL-SCNC: 105 MMOL/L
CO2 SERPL-SCNC: 23 MMOL/L
CREAT SERPL-MCNC: 0.6 MG/DL
EGFR: 86 ML/MIN/1.73M2
EOSINOPHIL # BLD AUTO: 0.32 K/UL
EOSINOPHIL # BLD AUTO: 0.32 K/UL
EOSINOPHIL NFR BLD AUTO: 4.4 %
EOSINOPHIL NFR BLD AUTO: 4.4 %
ERYTHROCYTE [SEDIMENTATION RATE] IN BLOOD BY WESTERGREN METHOD: 15 MM/HR
FERRITIN SERPL-MCNC: 65 NG/ML
FOLATE SERPL-MCNC: 13.9 NG/ML
GLUCOSE SERPL-MCNC: 82 MG/DL
HCT VFR BLD CALC: 40.4 %
HGB BLD-MCNC: 13.3 G/DL
IRON SATN MFR SERPL: 30 %
IRON SERPL-MCNC: 105 UG/DL
LYMPHOCYTES # BLD AUTO: 1.14 K/UL
LYMPHOCYTES # BLD AUTO: 1.14 K/UL
LYMPHOCYTES NFR BLD AUTO: 15.9 %
LYMPHOCYTES NFR BLD AUTO: 15.9 %
MAN DIFF?: NORMAL
MCHC RBC-ENTMCNC: 30.3 PG
MCHC RBC-ENTMCNC: 32.9 GM/DL
MCV RBC AUTO: 92 FL
MONOCYTES # BLD AUTO: 0.51 K/UL
MONOCYTES # BLD AUTO: 0.51 K/UL
MONOCYTES NFR BLD AUTO: 7.1 %
MONOCYTES NFR BLD AUTO: 7.1 %
MSMEAR: NORMAL
NEUTROPHILS # BLD AUTO: 5.14 K/UL
NEUTROPHILS # BLD AUTO: 5.14 K/UL
NEUTROPHILS NFR BLD AUTO: 71.7 %
NEUTROPHILS NFR BLD AUTO: 71.7 %
PLAT MORPH BLD: ABNORMAL
PLATELET # BLD AUTO: 257 K/UL
POTASSIUM SERPL-SCNC: 4.5 MMOL/L
PROT SERPL-MCNC: 6.8 G/DL
RBC # BLD: 4.39 M/UL
RBC # BLD: 4.39 M/UL
RBC # FLD: 14.6 %
RBC BLD AUTO: NORMAL
RETICS # AUTO: 1.6 %
RETICS AGGREG/RBC NFR: 71.6 K/UL
SMUDGE CELLS # BLD: PRESENT
SODIUM SERPL-SCNC: 139 MMOL/L
TIBC SERPL-MCNC: 350 UG/DL
UIBC SERPL-MCNC: 245 UG/DL
VIT B12 SERPL-MCNC: 994 PG/ML
WBC # FLD AUTO: 7.17 K/UL

## 2023-10-03 ENCOUNTER — RX RENEWAL (OUTPATIENT)
Age: 88
End: 2023-10-03

## 2023-10-03 DIAGNOSIS — E03.9 HYPOTHYROIDISM, UNSPECIFIED: ICD-10-CM

## 2023-10-03 RX ORDER — LEVOTHYROXINE SODIUM 100 UG/1
100 TABLET ORAL
Qty: 90 | Refills: 3 | Status: ACTIVE | COMMUNITY
Start: 2023-10-03 | End: 1900-01-01

## 2023-11-08 ENCOUNTER — RX RENEWAL (OUTPATIENT)
Age: 88
End: 2023-11-08

## 2023-11-14 ENCOUNTER — APPOINTMENT (OUTPATIENT)
Dept: HEMATOLOGY ONCOLOGY | Facility: CLINIC | Age: 88
End: 2023-11-14
Payer: MEDICARE

## 2023-11-14 VITALS
HEART RATE: 66 BPM | WEIGHT: 80 LBS | HEIGHT: 62 IN | DIASTOLIC BLOOD PRESSURE: 78 MMHG | OXYGEN SATURATION: 99 % | TEMPERATURE: 96.3 F | SYSTOLIC BLOOD PRESSURE: 122 MMHG | BODY MASS INDEX: 14.72 KG/M2

## 2023-11-14 DIAGNOSIS — D50.0 IRON DEFICIENCY ANEMIA SECONDARY TO BLOOD LOSS (CHRONIC): ICD-10-CM

## 2023-11-14 PROCEDURE — 99213 OFFICE O/P EST LOW 20 MIN: CPT | Mod: 25

## 2023-11-14 PROCEDURE — 36415 COLL VENOUS BLD VENIPUNCTURE: CPT

## 2023-11-15 ENCOUNTER — NON-APPOINTMENT (OUTPATIENT)
Age: 88
End: 2023-11-15

## 2023-11-15 LAB
ALBUMIN SERPL ELPH-MCNC: 4.1 G/DL
ALP BLD-CCNC: 87 U/L
ALT SERPL-CCNC: 81 U/L
ANION GAP SERPL CALC-SCNC: 11 MMOL/L
AST SERPL-CCNC: 68 U/L
BASOPHILS # BLD AUTO: 0 K/UL
BASOPHILS # BLD AUTO: 0 K/UL
BASOPHILS NFR BLD AUTO: 0 %
BASOPHILS NFR BLD AUTO: 0 %
BILIRUB SERPL-MCNC: 0.3 MG/DL
BUN SERPL-MCNC: 13 MG/DL
CALCIUM SERPL-MCNC: 9.8 MG/DL
CHLORIDE SERPL-SCNC: 108 MMOL/L
CO2 SERPL-SCNC: 24 MMOL/L
CREAT SERPL-MCNC: 0.58 MG/DL
EGFR: 87 ML/MIN/1.73M2
EOSINOPHIL # BLD AUTO: 0.12 K/UL
EOSINOPHIL # BLD AUTO: 0.12 K/UL
EOSINOPHIL NFR BLD AUTO: 1.8 %
EOSINOPHIL NFR BLD AUTO: 1.8 %
ERYTHROCYTE [SEDIMENTATION RATE] IN BLOOD BY WESTERGREN METHOD: 14 MM/HR
FERRITIN SERPL-MCNC: 51 NG/ML
FOLATE SERPL-MCNC: >20 NG/ML
GLUCOSE SERPL-MCNC: 74 MG/DL
HCT VFR BLD CALC: 40.4 %
HGB BLD-MCNC: 13.2 G/DL
IRON SATN MFR SERPL: 25 %
IRON SERPL-MCNC: 97 UG/DL
LYMPHOCYTES # BLD AUTO: 1.62 K/UL
LYMPHOCYTES # BLD AUTO: 1.62 K/UL
LYMPHOCYTES NFR BLD AUTO: 23.7 %
LYMPHOCYTES NFR BLD AUTO: 23.7 %
MAN DIFF?: NORMAL
MCHC RBC-ENTMCNC: 30 PG
MCHC RBC-ENTMCNC: 32.7 GM/DL
MCV RBC AUTO: 91.8 FL
MONOCYTES # BLD AUTO: 0.24 K/UL
MONOCYTES # BLD AUTO: 0.24 K/UL
MONOCYTES NFR BLD AUTO: 3.5 %
MONOCYTES NFR BLD AUTO: 3.5 %
MSMEAR: NORMAL
NEUTROPHILS # BLD AUTO: 4.86 K/UL
NEUTROPHILS # BLD AUTO: 4.86 K/UL
NEUTROPHILS NFR BLD AUTO: 71 %
NEUTROPHILS NFR BLD AUTO: 71 %
PLAT MORPH BLD: NORMAL
PLATELET # BLD AUTO: 266 K/UL
POTASSIUM SERPL-SCNC: 4.4 MMOL/L
PROT SERPL-MCNC: 7 G/DL
RBC # BLD: 4.4 M/UL
RBC # BLD: 4.4 M/UL
RBC # FLD: 14.8 %
RBC BLD AUTO: NORMAL
RETICS # AUTO: 1.1 %
RETICS AGGREG/RBC NFR: 47.5 K/UL
SODIUM SERPL-SCNC: 143 MMOL/L
TIBC SERPL-MCNC: 389 UG/DL
UIBC SERPL-MCNC: 292 UG/DL
VIT B12 SERPL-MCNC: 1256 PG/ML
WBC # FLD AUTO: 6.85 K/UL

## 2023-12-27 ENCOUNTER — APPOINTMENT (OUTPATIENT)
Dept: HEMATOLOGY ONCOLOGY | Facility: CLINIC | Age: 88
End: 2023-12-27

## 2023-12-28 ENCOUNTER — APPOINTMENT (OUTPATIENT)
Dept: HEMATOLOGY ONCOLOGY | Facility: CLINIC | Age: 88
End: 2023-12-28
Payer: MEDICARE

## 2023-12-28 VITALS
HEIGHT: 62 IN | BODY MASS INDEX: 14.35 KG/M2 | WEIGHT: 78 LBS | SYSTOLIC BLOOD PRESSURE: 128 MMHG | TEMPERATURE: 96.3 F | DIASTOLIC BLOOD PRESSURE: 80 MMHG | OXYGEN SATURATION: 98 % | HEART RATE: 76 BPM

## 2023-12-28 PROCEDURE — 99213 OFFICE O/P EST LOW 20 MIN: CPT | Mod: 25

## 2023-12-28 PROCEDURE — 36415 COLL VENOUS BLD VENIPUNCTURE: CPT

## 2023-12-28 NOTE — ASSESSMENT
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 Patient has been stable thus far after the last 2 Feraheme infusions in September 2022.  Have ordered B12 and folate, CBC with auto differential, CMP, ferritin, iron panel, manual differential, reticulocyte count and sed rate. Venipuncture was performed in the office today. Additional iron infusions to be determined pending results. Patient was advised to call office to discuss results and further management.

## 2023-12-28 NOTE — REASON FOR VISIT
[Follow-Up Visit] : a follow-up visit for [FreeTextEntry2] : Iron deficiency anemia, Iron deficiency due to chronic blood loss, fatigue, elevated liver enzymes, elevated vitamin B12,

## 2023-12-28 NOTE — REVIEW OF SYSTEMS
[Recent Change In Weight] : ~T recent weight change [Loss of Hearing] : loss of hearing [Negative] : Allergic/Immunologic [Fever] : no fever [Chills] : no chills [Night Sweats] : no night sweats [Fatigue] : no fatigue [Vision Problems] : no vision problems [Nosebleeds] : no nosebleeds [Chest Pain] : no chest pain [Shortness Of Breath] : no shortness of breath [Abdominal Pain] : no abdominal pain [Skin Rash] : no skin rash [Dizziness] : no dizziness [Easy Bleeding] : no tendency for easy bleeding [Easy Bruising] : no tendency for easy bruising [FreeTextEntry2] : Lost 2 pounds [FreeTextEntry4] : Hearing has improved with new hearing aids [FreeTextEntry7] : no diarrhea [de-identified] : Recent death of her daughter
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
## 2023-12-28 NOTE — HISTORY OF PRESENT ILLNESS
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 Patient last received Feraheme iron infusions x 2 in September 2022.At the last visit, the hemoglobin was very stable at 13.2, the iron panel was normal and the ferritin was stable at 51. The comprehensive metabolic panel was significant for mild elevation in the liver function test as before but stable. B12 was slightly above the range and folate was normal. The patient is presently grieving over the loss of her daughter who has struggled with MS for many years.  The patient states that she eats less now and has lost 2 pounds.  She has otherwise felt generally well.  Her new hearing aids have greatly improved her hearing.  She denies fever, chills, sweats, visible blood in urine or stool, epistaxis, gingival bleeding, abdominal pain, dizziness, shortness of breath or recent infections.

## 2023-12-29 ENCOUNTER — NON-APPOINTMENT (OUTPATIENT)
Age: 88
End: 2023-12-29

## 2023-12-29 LAB
ALBUMIN SERPL ELPH-MCNC: 4.5 G/DL
ALP BLD-CCNC: 89 U/L
ALT SERPL-CCNC: 53 U/L
ANION GAP SERPL CALC-SCNC: 10 MMOL/L
ANISOCYTOSIS BLD QL: SLIGHT
AST SERPL-CCNC: 55 U/L
BASOPHILS # BLD AUTO: 0.07 K/UL
BASOPHILS # BLD AUTO: 0.07 K/UL
BASOPHILS NFR BLD AUTO: 0.9 %
BASOPHILS NFR BLD AUTO: 0.9 %
BILIRUB SERPL-MCNC: 0.3 MG/DL
BUN SERPL-MCNC: 17 MG/DL
CALCIUM SERPL-MCNC: 10.1 MG/DL
CHLORIDE SERPL-SCNC: 105 MMOL/L
CO2 SERPL-SCNC: 25 MMOL/L
CREAT SERPL-MCNC: 0.63 MG/DL
EGFR: 85 ML/MIN/1.73M2
EOSINOPHIL # BLD AUTO: 0 K/UL
EOSINOPHIL # BLD AUTO: 0 K/UL
EOSINOPHIL NFR BLD AUTO: 0 %
EOSINOPHIL NFR BLD AUTO: 0 %
ERYTHROCYTE [SEDIMENTATION RATE] IN BLOOD BY WESTERGREN METHOD: 15 MM/HR
FERRITIN SERPL-MCNC: 31 NG/ML
FOLATE SERPL-MCNC: 17.3 NG/ML
GIANT PLATELETS BLD QL SMEAR: PRESENT
GLUCOSE SERPL-MCNC: 88 MG/DL
HCT VFR BLD CALC: 40.9 %
HGB BLD-MCNC: 13.3 G/DL
IRON SATN MFR SERPL: 22 %
IRON SERPL-MCNC: 88 UG/DL
LYMPHOCYTES # BLD AUTO: 1.11 K/UL
LYMPHOCYTES # BLD AUTO: 1.11 K/UL
LYMPHOCYTES NFR BLD AUTO: 15.3 %
LYMPHOCYTES NFR BLD AUTO: 15.3 %
MACROCYTES BLD QL SMEAR: SLIGHT
MAN DIFF?: NORMAL
MCHC RBC-ENTMCNC: 29.4 PG
MCHC RBC-ENTMCNC: 32.5 GM/DL
MCV RBC AUTO: 90.3 FL
MONOCYTES # BLD AUTO: 0.33 K/UL
MONOCYTES # BLD AUTO: 0.33 K/UL
MONOCYTES NFR BLD AUTO: 4.5 %
MONOCYTES NFR BLD AUTO: 4.5 %
MSMEAR: NORMAL
NEUTROPHILS # BLD AUTO: 5.77 K/UL
NEUTROPHILS # BLD AUTO: 5.77 K/UL
NEUTROPHILS NFR BLD AUTO: 79.3 %
NEUTROPHILS NFR BLD AUTO: 79.3 %
OVALOCYTES BLD QL SMEAR: SLIGHT
PLAT MORPH BLD: ABNORMAL
PLATELET # BLD AUTO: 282 K/UL
POIKILOCYTOSIS BLD QL SMEAR: SLIGHT
POTASSIUM SERPL-SCNC: 4.4 MMOL/L
PROT SERPL-MCNC: 7.3 G/DL
RBC # BLD: 4.53 M/UL
RBC # BLD: 4.53 M/UL
RBC # FLD: 15.4 %
RBC BLD AUTO: ABNORMAL
RETICS # AUTO: 1.1 %
RETICS AGGREG/RBC NFR: 49.8 K/UL
SMUDGE CELLS # BLD: PRESENT
SODIUM SERPL-SCNC: 140 MMOL/L
TIBC SERPL-MCNC: 396 UG/DL
UIBC SERPL-MCNC: 308 UG/DL
VIT B12 SERPL-MCNC: 1135 PG/ML
WBC # FLD AUTO: 7.28 K/UL

## 2024-01-19 ENCOUNTER — RX RENEWAL (OUTPATIENT)
Age: 89
End: 2024-01-19

## 2024-03-06 ENCOUNTER — APPOINTMENT (OUTPATIENT)
Dept: HEMATOLOGY ONCOLOGY | Facility: CLINIC | Age: 89
End: 2024-03-06
Payer: MEDICARE

## 2024-03-06 VITALS
OXYGEN SATURATION: 100 % | BODY MASS INDEX: 13.62 KG/M2 | HEIGHT: 62 IN | WEIGHT: 74 LBS | DIASTOLIC BLOOD PRESSURE: 80 MMHG | TEMPERATURE: 96.6 F | HEART RATE: 74 BPM | SYSTOLIC BLOOD PRESSURE: 122 MMHG

## 2024-03-06 LAB
ERYTHROCYTE [SEDIMENTATION RATE] IN BLOOD BY WESTERGREN METHOD: 17 MM/HR
RBC # BLD: 4.47 M/UL
RETICS # AUTO: 1.1 %
RETICS AGGREG/RBC NFR: 51 K/UL

## 2024-03-06 PROCEDURE — G2211 COMPLEX E/M VISIT ADD ON: CPT

## 2024-03-06 PROCEDURE — 99213 OFFICE O/P EST LOW 20 MIN: CPT

## 2024-03-06 PROCEDURE — 36415 COLL VENOUS BLD VENIPUNCTURE: CPT

## 2024-03-06 RX ORDER — DIPHENOXYLATE HYDROCHLORIDE AND ATROPINE SULFATE 2.5; .025 MG/1; MG/1
2.5-0.025 TABLET ORAL
Qty: 120 | Refills: 3 | Status: DISCONTINUED | COMMUNITY
Start: 2021-09-14 | End: 2024-03-06

## 2024-03-06 NOTE — REVIEW OF SYSTEMS
[Recent Change In Weight] : ~T recent weight change [Fatigue] : fatigue [Loss of Hearing] : loss of hearing [Depression] : depression [Negative] : Allergic/Immunologic [Night Sweats] : no night sweats [Fever] : no fever [Chest Pain] : no chest pain [Vision Problems] : no vision problems [Shortness Of Breath] : no shortness of breath [Abdominal Pain] : no abdominal pain [Joint Pain] : no joint pain [Joint Stiffness] : no joint stiffness [Skin Rash] : no skin rash [Easy Bleeding] : no tendency for easy bleeding [Dizziness] : no dizziness [Easy Bruising] : no tendency for easy bruising [de-identified] : recent loss of daughter [FreeTextEntry2] : Lost 4 pounds

## 2024-03-06 NOTE — HISTORY OF PRESENT ILLNESS
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 Patient last received Feraheme iron infusions x 2 in September 2022. At the last visit in December 2023, the CBC was stable with hemoglobin of 13.3. Iron panel was normal and ferritin at low end of normal at 31. CMP remarkable for mildly elevated LFTs as patient has chronically. B12 and folate were normal. Patient has lost 4 pounds since the last visit. Today, the patient feels fatigued and complains of chronic loss of hearing. She is otherwise feeling generally well. She remains active by walking. Patient will see her PCP, Dr. Lee in April. She denies fever, chills, sweats, visible blood in urine or stool, epistaxis, gingival bleeding, abdominal pain, dizziness, shortness of breath or recent infections.

## 2024-03-06 NOTE — REASON FOR VISIT
[Follow-Up Visit] : a follow-up visit for [FreeTextEntry2] : Anemia, iron deficiency anemia, fatigue
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
## 2024-03-06 NOTE — ASSESSMENT
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 Patient has been stable thus far after the last 2 Feraheme infusions in September 2022. Have ordered B12 and folate, CBC with auto differential, CMP, ferritin, iron panel, manual differential, reticulocyte count and sed rate. Venipuncture was performed in the office today. Additional iron infusions to be determined pending results. Patient was advised to call office to discuss results and further management.

## 2024-03-07 LAB
ALBUMIN SERPL ELPH-MCNC: 4.5 G/DL
ALP BLD-CCNC: 106 U/L
ALT SERPL-CCNC: 76 U/L
ANION GAP SERPL CALC-SCNC: 12 MMOL/L
AST SERPL-CCNC: 60 U/L
BASOPHILS # BLD AUTO: 0.07 K/UL
BASOPHILS # BLD AUTO: 0.07 K/UL
BASOPHILS NFR BLD AUTO: 0.9 %
BASOPHILS NFR BLD AUTO: 0.9 %
BILIRUB SERPL-MCNC: 0.3 MG/DL
BUN SERPL-MCNC: 13 MG/DL
CALCIUM SERPL-MCNC: 9.8 MG/DL
CHLORIDE SERPL-SCNC: 104 MMOL/L
CO2 SERPL-SCNC: 24 MMOL/L
CREAT SERPL-MCNC: 0.64 MG/DL
EGFR: 84 ML/MIN/1.73M2
EOSINOPHIL # BLD AUTO: 0.07 K/UL
EOSINOPHIL # BLD AUTO: 0.07 K/UL
EOSINOPHIL NFR BLD AUTO: 0.9 %
EOSINOPHIL NFR BLD AUTO: 0.9 %
FERRITIN SERPL-MCNC: 22 NG/ML
FOLATE SERPL-MCNC: 17.2 NG/ML
GLUCOSE SERPL-MCNC: 87 MG/DL
HCT VFR BLD CALC: 40.7 %
HGB BLD-MCNC: 13.1 G/DL
IRON SATN MFR SERPL: 15 %
IRON SERPL-MCNC: 59 UG/DL
LYMPHOCYTES # BLD AUTO: 0.99 K/UL
LYMPHOCYTES # BLD AUTO: 0.99 K/UL
LYMPHOCYTES NFR BLD AUTO: 13.4 %
LYMPHOCYTES NFR BLD AUTO: 13.4 %
MAN DIFF?: NORMAL
MCHC RBC-ENTMCNC: 29.3 PG
MCHC RBC-ENTMCNC: 32.2 GM/DL
MCV RBC AUTO: 91.1 FL
MONOCYTES # BLD AUTO: 0.27 K/UL
MONOCYTES # BLD AUTO: 0.27 K/UL
MONOCYTES NFR BLD AUTO: 3.6 %
MONOCYTES NFR BLD AUTO: 3.6 %
MSMEAR: NORMAL
NEUTROPHILS # BLD AUTO: 6.01 K/UL
NEUTROPHILS # BLD AUTO: 6.01 K/UL
NEUTROPHILS NFR BLD AUTO: 81.2 %
NEUTROPHILS NFR BLD AUTO: 81.2 %
PLAT MORPH BLD: ABNORMAL
PLATELET # BLD AUTO: 277 K/UL
POTASSIUM SERPL-SCNC: 4.8 MMOL/L
PROT SERPL-MCNC: 7.2 G/DL
RBC # BLD: 4.47 M/UL
RBC # FLD: 15.4 %
RBC BLD AUTO: NORMAL
SMUDGE CELLS # BLD: PRESENT
SODIUM SERPL-SCNC: 140 MMOL/L
TIBC SERPL-MCNC: 404 UG/DL
UIBC SERPL-MCNC: 345 UG/DL
VIT B12 SERPL-MCNC: 1126 PG/ML
WBC # FLD AUTO: 7.4 K/UL

## 2024-04-01 ENCOUNTER — RX RENEWAL (OUTPATIENT)
Age: 89
End: 2024-04-01

## 2024-04-01 RX ORDER — PANCRELIPASE 120000; 24000; 76000 [USP'U]/1; [USP'U]/1; [USP'U]/1
24000-76000 CAPSULE, DELAYED RELEASE PELLETS ORAL
Qty: 540 | Refills: 3 | Status: ACTIVE | COMMUNITY
Start: 2021-05-11 | End: 1900-01-01

## 2024-04-11 ENCOUNTER — APPOINTMENT (OUTPATIENT)
Dept: HEMATOLOGY ONCOLOGY | Facility: CLINIC | Age: 89
End: 2024-04-11
Payer: MEDICARE

## 2024-04-11 ENCOUNTER — LABORATORY RESULT (OUTPATIENT)
Age: 89
End: 2024-04-11

## 2024-04-11 VITALS
DIASTOLIC BLOOD PRESSURE: 78 MMHG | OXYGEN SATURATION: 99 % | HEIGHT: 62 IN | BODY MASS INDEX: 13.8 KG/M2 | TEMPERATURE: 96.3 F | WEIGHT: 75 LBS | SYSTOLIC BLOOD PRESSURE: 130 MMHG | HEART RATE: 67 BPM

## 2024-04-11 DIAGNOSIS — R74.8 ABNORMAL LEVELS OF OTHER SERUM ENZYMES: ICD-10-CM

## 2024-04-11 DIAGNOSIS — D50.9 IRON DEFICIENCY ANEMIA, UNSPECIFIED: ICD-10-CM

## 2024-04-11 DIAGNOSIS — R53.83 OTHER FATIGUE: ICD-10-CM

## 2024-04-11 DIAGNOSIS — D64.9 ANEMIA, UNSPECIFIED: ICD-10-CM

## 2024-04-11 LAB
RBC # BLD: 4.35 M/UL
RETICS # AUTO: 1.2 %
RETICS AGGREG/RBC NFR: 50 K/UL

## 2024-04-11 PROCEDURE — G2211 COMPLEX E/M VISIT ADD ON: CPT

## 2024-04-11 PROCEDURE — 36415 COLL VENOUS BLD VENIPUNCTURE: CPT

## 2024-04-11 PROCEDURE — 99213 OFFICE O/P EST LOW 20 MIN: CPT

## 2024-04-11 NOTE — ASSESSMENT
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 Patient has been stable thus far after the last 2 Feraheme infusions in September 2022.  Patient is depressed since the loss of both her children and feels alone.  She also has a new complaint of vaginal spotting.  She has an appointment with her geriatrician, Dr. Lee next month.  Will speak to Dr. Lee prior to patient's visit. Have ordered B12 and folate, CBC with auto differential, CMP, ferritin, iron panel, manual differential, reticulocyte count and sed rate. Venipuncture was performed in the office today. Additional iron infusions to be determined pending results. Patient was advised to call office to discuss results and further management. If hematologically stable, patient will return in 2 to 3 months.

## 2024-04-11 NOTE — REVIEW OF SYSTEMS
[Fatigue] : fatigue [Loss of Hearing] : loss of hearing [Joint Stiffness] : joint stiffness [Negative] : Allergic/Immunologic [Depression] : depression [Fever] : no fever [Chills] : no chills [Night Sweats] : no night sweats [Recent Change In Weight] : ~T no recent weight change [Vision Problems] : no vision problems [Nosebleeds] : no nosebleeds [Chest Pain] : no chest pain [Palpitations] : no palpitations [Shortness Of Breath] : no shortness of breath [Abdominal Pain] : no abdominal pain [Joint Pain] : no joint pain [Muscle Pain] : no muscle pain [Muscle Weakness] : no muscle weakness [Skin Rash] : no skin rash [Dizziness] : no dizziness [Easy Bleeding] : no tendency for easy bleeding [Easy Bruising] : no tendency for easy bruising [FreeTextEntry7] : no diarrhea [FreeTextEntry8] : ? Occasional vaginal spotting

## 2024-04-11 NOTE — HISTORY OF PRESENT ILLNESS
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 Patient last received Feraheme iron infusions x 2 in September 2022. At the last visit patient was found to have a normal hemoglobin of 13.1 with hematocrit of 40.7, B12 over thousand, normal folate, low normal iron saturation at 15% and a ferritin of 22.  CMP was significant for chronic mild elevation in the liver function tests.  Patient states she remains depressed over the  loss of both her son and daughter.  She states she has a good appetite but fails to put on any weight.  She no longer has diarrhea and denies blood in stool but does admit to occasional vaginal spotting which she said she has had for quite some time.  She denies chest pain, shortness of breath, abdominal pain, diarrhea, joint pain, or recent infections.  Patient states she has an appointment with her geriatrician, Dr. Lee next month.

## 2024-04-11 NOTE — REASON FOR VISIT
[Follow-Up Visit] : a follow-up visit for [FreeTextEntry2] : Anemia of chronic illness, iron deficiency anemia, fatigue, elevated B12 level

## 2024-04-11 NOTE — PHYSICAL EXAM
[Thin] : thin [Normal] : affect appropriate [de-identified] : bilateral hearing aids [de-identified] : RRR, S1, S2, ?murmur,  occasional ectopy [de-identified] : multiple surgical scars [de-identified] : Full ROM,s/p left patellar fracture, s/p right patellar fracture, s/p left fifth finger fracture, s/p right elbow fracture,  s/p left wrist fracture [de-identified] :  multiple scars on abdomen,  multiple senile keratoses, scar right knee, scar left knee, soft tissue mass 2 cm on scalp unchanged, dry, flaky,skin

## 2024-04-12 LAB
BASOPHILS # BLD AUTO: 0.18 K/UL
BASOPHILS # BLD AUTO: 0.18 K/UL
BASOPHILS NFR BLD AUTO: 3.5 %
BASOPHILS NFR BLD AUTO: 3.5 %
EOSINOPHIL # BLD AUTO: 0.18 K/UL
EOSINOPHIL # BLD AUTO: 0.18 K/UL
EOSINOPHIL NFR BLD AUTO: 3.5 %
EOSINOPHIL NFR BLD AUTO: 3.5 %
ERYTHROCYTE [SEDIMENTATION RATE] IN BLOOD BY WESTERGREN METHOD: 19 MM/HR
HCT VFR BLD CALC: 39.6 %
HGB BLD-MCNC: 12.6 G/DL
HYPOCHROMIA BLD QL SMEAR: SLIGHT
LYMPHOCYTES # BLD AUTO: 0.84 K/UL
LYMPHOCYTES # BLD AUTO: 0.84 K/UL
LYMPHOCYTES NFR BLD AUTO: 16.5 %
LYMPHOCYTES NFR BLD AUTO: 16.5 %
MAN DIFF?: NORMAL
MCHC RBC-ENTMCNC: 29 PG
MCHC RBC-ENTMCNC: 31.8 GM/DL
MCV RBC AUTO: 91 FL
MONOCYTES # BLD AUTO: 0.53 K/UL
MONOCYTES # BLD AUTO: 0.53 K/UL
MONOCYTES NFR BLD AUTO: 10.4 %
MONOCYTES NFR BLD AUTO: 10.4 %
MSMEAR: NORMAL
NEUTROPHILS # BLD AUTO: 3.36 K/UL
NEUTROPHILS # BLD AUTO: 3.36 K/UL
NEUTROPHILS NFR BLD AUTO: 65.2 %
NEUTROPHILS NFR BLD AUTO: 65.2 %
NEUTS BAND NFR BLD MANUAL: 0.9 %
PLAT MORPH BLD: NORMAL
PLATELET # BLD AUTO: 320 K/UL
RBC # BLD: 4.35 M/UL
RBC # FLD: 15.3 %
RBC BLD AUTO: NORMAL
SMUDGE CELLS # BLD: PRESENT
WBC # FLD AUTO: 5.09 K/UL

## 2024-04-14 ENCOUNTER — NON-APPOINTMENT (OUTPATIENT)
Age: 89
End: 2024-04-14

## 2024-08-15 ENCOUNTER — APPOINTMENT (OUTPATIENT)
Dept: HEMATOLOGY ONCOLOGY | Facility: CLINIC | Age: 89
End: 2024-08-15

## 2024-09-16 ENCOUNTER — NON-APPOINTMENT (OUTPATIENT)
Age: 89
End: 2024-09-16